# Patient Record
Sex: FEMALE | Race: WHITE | NOT HISPANIC OR LATINO | Employment: UNEMPLOYED | URBAN - METROPOLITAN AREA
[De-identification: names, ages, dates, MRNs, and addresses within clinical notes are randomized per-mention and may not be internally consistent; named-entity substitution may affect disease eponyms.]

---

## 2017-04-18 ENCOUNTER — OFFICE VISIT (OUTPATIENT)
Dept: NEUROLOGY | Facility: CLINIC | Age: 42
End: 2017-04-18

## 2017-04-18 VITALS
BODY MASS INDEX: 24.11 KG/M2 | SYSTOLIC BLOOD PRESSURE: 117 MMHG | DIASTOLIC BLOOD PRESSURE: 84 MMHG | HEART RATE: 115 BPM | WEIGHT: 150 LBS | HEIGHT: 66 IN

## 2017-04-18 DIAGNOSIS — M79.7 FIBROMYALGIA: ICD-10-CM

## 2017-04-18 DIAGNOSIS — R20.2 PARESTHESIA: Primary | ICD-10-CM

## 2017-04-18 DIAGNOSIS — R53.1 WEAKNESS: ICD-10-CM

## 2017-04-18 PROCEDURE — 99214 OFFICE O/P EST MOD 30 MIN: CPT | Performed by: PSYCHIATRY & NEUROLOGY

## 2017-04-18 RX ORDER — GABAPENTIN 300 MG/1
300 CAPSULE ORAL 3 TIMES DAILY
Qty: 90 CAPSULE | Refills: 5 | Status: SHIPPED | OUTPATIENT
Start: 2017-04-18 | End: 2018-01-16

## 2017-04-18 RX ORDER — GABAPENTIN 100 MG/1
100 CAPSULE ORAL 2 TIMES DAILY
COMMUNITY
End: 2017-04-18 | Stop reason: SDUPTHER

## 2017-04-18 NOTE — PROGRESS NOTES
Subjective:     Patient ID: Silvia Tracy is a 42 y.o. female.    History of Present Illness  The following portions of the patient's history were reviewed and updated as appropriate: allergies, current medications, past family history, past medical history, past social history, past surgical history and problem list.  43 y/o WF has been having limb numbness and tingling, was hospitalized for weakness in November, no definite cause found, better with IV steroids. She reports intermittent weakness as well. She has leg aching at night that wakes her up. She has had a rheumatology work up, found to have a positive DWIGHT.  Review of Systems   Constitutional: Positive for fatigue.   Endocrine:        Muscle weakness   Musculoskeletal:        Joint stiffness, limb pain   Neurological: Positive for dizziness and numbness.        Confusion, tingling, limb weakness, difficulty walking    Psychiatric/Behavioral:        Anxiety          Objective:    Neurologic Exam     Mental Status   Oriented to person, place, and time.     Cranial Nerves     CN III, IV, VI   Pupils are equal, round, and reactive to light.  Extraocular motions are normal.     Motor Exam     Strength   Strength 5/5 throughout.       Physical Exam   Constitutional: She is oriented to person, place, and time. She appears well-developed and well-nourished.   HENT:   Head: Normocephalic and atraumatic.   Eyes: EOM are normal. Pupils are equal, round, and reactive to light.   Neck: Normal range of motion. Neck supple. Carotid bruit is not present.   Cardiovascular: Normal rate, regular rhythm, normal heart sounds and intact distal pulses.    Pulmonary/Chest: Effort normal and breath sounds normal.   Abdominal: Soft. Bowel sounds are normal.   Musculoskeletal: Normal range of motion.   Neurological: She is alert and oriented to person, place, and time. She has normal strength and normal reflexes. No cranial nerve deficit or sensory deficit. She displays a  negative Romberg sign. Coordination and gait normal. She displays no Babinski's sign on the right side. She displays no Babinski's sign on the left side.   Skin: Skin is warm.   Psychiatric: She has a normal mood and affect. Her behavior is normal. Thought content normal. Cognition and memory are normal.       Assessment/Plan:     Silvia was seen today for follow-up.    Diagnoses and all orders for this visit:    Paresthesia  -     gabapentin (NEURONTIN) 300 MG capsule; Take 1 capsule by mouth 3 (Three) Times a Day.    Weakness  -     gabapentin (NEURONTIN) 300 MG capsule; Take 1 capsule by mouth 3 (Three) Times a Day.    Fibromyalgia  -     gabapentin (NEURONTIN) 300 MG capsule; Take 1 capsule by mouth 3 (Three) Times a Day.       During this visit the following were done:  Labs Reviewed [x]    Labs Ordered []    Radiology Reports Reviewed [x]    Radiology Ordered []    PCP Records Reviewed []    Referring Provider Records Reviewed []    ER Records Reviewed []    Hospital Records Reviewed [x]    History Obtained From Family []    Radiology Images Reviewed [x]    Other Reviewed []    Records Requested []

## 2017-11-08 DIAGNOSIS — R53.1 WEAKNESS: ICD-10-CM

## 2017-11-08 DIAGNOSIS — R20.2 PARESTHESIA: ICD-10-CM

## 2017-11-08 DIAGNOSIS — M79.7 FIBROMYALGIA: ICD-10-CM

## 2017-11-08 RX ORDER — GABAPENTIN 300 MG/1
CAPSULE ORAL
Qty: 90 CAPSULE | OUTPATIENT
Start: 2017-11-08

## 2017-12-07 ENCOUNTER — OFFICE VISIT (OUTPATIENT)
Dept: NEUROLOGY | Facility: CLINIC | Age: 42
End: 2017-12-07

## 2017-12-07 VITALS
DIASTOLIC BLOOD PRESSURE: 73 MMHG | HEART RATE: 96 BPM | WEIGHT: 160 LBS | HEIGHT: 66 IN | BODY MASS INDEX: 25.71 KG/M2 | OXYGEN SATURATION: 95 % | SYSTOLIC BLOOD PRESSURE: 102 MMHG

## 2017-12-07 DIAGNOSIS — R93.0 ABNORMAL MRI OF HEAD: ICD-10-CM

## 2017-12-07 DIAGNOSIS — R53.82 CHRONIC FATIGUE: ICD-10-CM

## 2017-12-07 DIAGNOSIS — G37.9 DEMYELINATING DISORDER (HCC): Primary | ICD-10-CM

## 2017-12-07 PROCEDURE — 99213 OFFICE O/P EST LOW 20 MIN: CPT | Performed by: PSYCHIATRY & NEUROLOGY

## 2017-12-07 RX ORDER — PREDNISONE 20 MG/1
TABLET ORAL
Qty: 30 TABLET | Refills: 1 | Status: SHIPPED | OUTPATIENT
Start: 2017-12-07 | End: 2018-01-15 | Stop reason: SDUPTHER

## 2017-12-07 NOTE — PROGRESS NOTES
Subjective:     Patient ID: Silvia Tracy is a 42 y.o. female.    CC:   Chief Complaint   Patient presents with   • paresthesia       HPI:   History of Present Illness  The following portions of the patient's history were reviewed and updated as appropriate: allergies, current medications, past family history, past medical history, past social history, past surgical history and problem list.     Patient complains of persistent fatigue, fluctuating numbness and weakness. She has seen rheumatology for positive DWIGHT, told that she does not have lupus. MRI of brain last year did show bilateral frontal gliosis, felt to be atypical for MS, MRIs of spine showed DDD without a high grade stenosis. CSF was negative but MS panel was not done that I could find. She denies a family h/o MS, loss of vision. She does report some heat intolerance that may be new. She feels much better while on steroids.    Past Medical History:   Diagnosis Date   • Acute congestive heart failure    • DWIGHT positive    • Anemia    • Anxiety    • History of Clostridium difficile     stool transplant    • History of transfusion    • Numbness and tingling of left leg        Past Surgical History:   Procedure Laterality Date   •  SECTION     • TEMPOROMANDIBULAR JOINT SURGERY         Social History     Social History   • Marital status:      Spouse name: N/A   • Number of children: N/A   • Years of education: N/A     Occupational History   • Not on file.     Social History Main Topics   • Smoking status: Never Smoker   • Smokeless tobacco: Not on file   • Alcohol use 0.6 oz/week     1 Glasses of wine per week   • Drug use: No   • Sexual activity: Defer     Other Topics Concern   • Not on file     Social History Narrative       Family History   Problem Relation Age of Onset   • Scleroderma Mother    • Coronary artery disease Father    • COPD Father         Review of Systems   Constitutional: Positive for fatigue and unexpected weight  change. Negative for chills and fever.   HENT: Negative for ear pain, hearing loss, nosebleeds, rhinorrhea and sore throat.    Eyes: Negative.  Negative for photophobia, pain, discharge, itching and visual disturbance.   Respiratory: Positive for chest tightness and shortness of breath. Negative for cough and wheezing.    Cardiovascular: Positive for palpitations and leg swelling. Negative for chest pain.   Gastrointestinal: Negative.  Negative for abdominal pain, blood in stool, constipation, diarrhea, nausea and vomiting.   Endocrine: Positive for cold intolerance.   Genitourinary: Negative.  Negative for dysuria, frequency, hematuria and urgency.   Musculoskeletal: Positive for arthralgias, back pain, gait problem, joint swelling, myalgias, neck pain and neck stiffness.   Skin: Negative.  Negative for rash and wound.   Allergic/Immunologic: Negative.  Negative for environmental allergies and food allergies.   Neurological: Positive for dizziness, tremors, speech difficulty, weakness, light-headedness, numbness and headaches. Negative for seizures and syncope.   Hematological: Negative.  Negative for adenopathy. Does not bruise/bleed easily.   Psychiatric/Behavioral: Positive for confusion. Negative for agitation, decreased concentration, hallucinations, sleep disturbance and suicidal ideas. The patient is nervous/anxious.         Objective:    Neurologic Exam     Mental Status   Oriented to person, place, and time.       Physical Exam   Constitutional: She is oriented to person, place, and time. She appears well-developed and well-nourished.   Cardiovascular: Normal rate and regular rhythm.    Pulmonary/Chest: Effort normal.   Neurological: She is alert and oriented to person, place, and time.   DTRs trace increased on the right.   Psychiatric: She has a normal mood and affect. Her behavior is normal. Thought content normal.       Assessment/Plan:       Silvia was seen today for paresthesia.    Diagnoses and  all orders for this visit:    Demyelinating disorder  -     MRI Brain With & Without Contrast  -     FL Lumbar Puncture Diagnosis  -     predniSONE (DELTASONE) 20 MG tablet; 4/dx3d, 3/dx3d, 2/dx3d, 1/x3d    Abnormal MRI of head  -     MRI Brain With & Without Contrast  -     FL Lumbar Puncture Diagnosis  -     predniSONE (DELTASONE) 20 MG tablet; 4/dx3d, 3/dx3d, 2/dx3d, 1/x3d    Chronic fatigue  -     predniSONE (DELTASONE) 20 MG tablet; 4/dx3d, 3/dx3d, 2/dx3d, 1/x3d           Andres Person MD  12/10/2017

## 2017-12-20 ENCOUNTER — HOSPITAL ENCOUNTER (OUTPATIENT)
Dept: GENERAL RADIOLOGY | Facility: HOSPITAL | Age: 42
Discharge: HOME | End: 2017-12-20
Attending: PSYCHIATRY & NEUROLOGY

## 2017-12-20 ENCOUNTER — HOSPITAL ENCOUNTER (OUTPATIENT)
Dept: MRI IMAGING | Facility: HOSPITAL | Age: 42
Discharge: HOME | End: 2017-12-20
Attending: PSYCHIATRY & NEUROLOGY | Admitting: PSYCHIATRY & NEUROLOGY

## 2017-12-20 VITALS
WEIGHT: 160 LBS | RESPIRATION RATE: 18 BRPM | SYSTOLIC BLOOD PRESSURE: 117 MMHG | HEIGHT: 66 IN | BODY MASS INDEX: 25.71 KG/M2 | OXYGEN SATURATION: 97 % | HEART RATE: 116 BPM | DIASTOLIC BLOOD PRESSURE: 80 MMHG | TEMPERATURE: 97.9 F

## 2017-12-20 LAB
APPEARANCE CSF: CLEAR
COLOR CSF: COLORLESS
GLUCOSE CSF-MCNC: 68 MG/DL (ref 40–70)
HOLD SPECIMEN: NORMAL
PROT CSF-MCNC: 18 MG/DL (ref 15–45)
RBC # CSF MANUAL: 1 /MM3 (ref 0–0)
SPECIMEN VOL CSF: 8 ML
TUBE # CSF: 3
WBC # CSF MANUAL: 1 /MM3 (ref 0–5)

## 2017-12-20 PROCEDURE — 82042 OTHER SOURCE ALBUMIN QUAN EA: CPT | Performed by: PSYCHIATRY & NEUROLOGY

## 2017-12-20 PROCEDURE — 82945 GLUCOSE OTHER FLUID: CPT | Performed by: PSYCHIATRY & NEUROLOGY

## 2017-12-20 PROCEDURE — 82040 ASSAY OF SERUM ALBUMIN: CPT | Performed by: PSYCHIATRY & NEUROLOGY

## 2017-12-20 PROCEDURE — 83916 OLIGOCLONAL BANDS: CPT | Performed by: PSYCHIATRY & NEUROLOGY

## 2017-12-20 PROCEDURE — 70553 MRI BRAIN STEM W/O & W/DYE: CPT

## 2017-12-20 PROCEDURE — 84157 ASSAY OF PROTEIN OTHER: CPT | Performed by: PSYCHIATRY & NEUROLOGY

## 2017-12-20 PROCEDURE — A9577 INJ MULTIHANCE: HCPCS | Performed by: PSYCHIATRY & NEUROLOGY

## 2017-12-20 PROCEDURE — 77003 FLUOROGUIDE FOR SPINE INJECT: CPT

## 2017-12-20 PROCEDURE — 83873 ASSAY OF CSF PROTEIN: CPT | Performed by: PSYCHIATRY & NEUROLOGY

## 2017-12-20 PROCEDURE — 0 GADOBENATE DIMEGLUMINE 529 MG/ML SOLUTION: Performed by: PSYCHIATRY & NEUROLOGY

## 2017-12-20 PROCEDURE — 89050 BODY FLUID CELL COUNT: CPT | Performed by: PSYCHIATRY & NEUROLOGY

## 2017-12-20 RX ORDER — LIDOCAINE HYDROCHLORIDE 10 MG/ML
10 INJECTION, SOLUTION INFILTRATION; PERINEURAL ONCE
Status: COMPLETED | OUTPATIENT
Start: 2017-12-20 | End: 2017-12-20

## 2017-12-20 RX ORDER — ALPRAZOLAM 0.5 MG/1
0.5 TABLET ORAL ONCE
Status: COMPLETED | OUTPATIENT
Start: 2017-12-20 | End: 2017-12-20

## 2017-12-20 RX ADMIN — GADOBENATE DIMEGLUMINE 15 ML: 529 INJECTION, SOLUTION INTRAVENOUS at 08:30

## 2017-12-20 RX ADMIN — ALPRAZOLAM 0.5 MG: 0.5 TABLET ORAL at 07:28

## 2017-12-20 RX ADMIN — LIDOCAINE HYDROCHLORIDE 10 ML: 10 INJECTION, SOLUTION INFILTRATION; PERINEURAL at 09:26

## 2017-12-20 NOTE — PLAN OF CARE
Problem: Patient Care Overview (Adult)  Goal: Plan of Care Review  Outcome: Ongoing (interventions implemented as appropriate)   12/20/17 2250   Coping/Psychosocial Response Interventions   Plan Of Care Reviewed With patient;spouse   Patient Care Overview   Progress no change

## 2017-12-20 NOTE — PLAN OF CARE
Problem: Patient Care Overview (Adult)  Goal: Discharge Needs Assessment   12/20/17 0800   Discharge Needs Assessment   Concerns To Be Addressed no discharge needs identified   Readmission Within The Last 30 Days no previous admission in last 30 days   Equipment Needed After Discharge none   Discharge Disposition home or self-care   Current Health   Anticipated Changes Related to Illness none   Self-Care   Equipment Currently Used at Home none   Living Environment   Transportation Available family or friend will provide

## 2017-12-20 NOTE — POST-PROCEDURE NOTE
Radiology Procedure    Pre-procedure: procedure, risks discussed with patient. Patient understood and consented to procedure     Procedure Performed: lumbar puncture     IV Sedation and/or Anesthesia:  No    Complications: none    Preliminary Findings: opening pressure 10cm H2O    Specimen Removed: 8cc clear, colorless CSF    Estimated Blood Loss:  0ml    Post-Procedure Diagnosis: pending    Post-Procedure Plan: encourage fluids, bed rest  X 2 hours    Standard Discharge Instructions Given:yes     SUELLEN Avalos  12/20/17  9:18 AM

## 2017-12-21 ENCOUNTER — TELEPHONE (OUTPATIENT)
Dept: NEUROLOGY | Facility: CLINIC | Age: 42
End: 2017-12-21

## 2017-12-21 ENCOUNTER — TELEPHONE (OUTPATIENT)
Dept: INTERVENTIONAL RADIOLOGY/VASCULAR | Facility: HOSPITAL | Age: 42
End: 2017-12-21

## 2017-12-21 NOTE — TELEPHONE ENCOUNTER
From looking at the MRI, it is essentially unchanged from last year. As for the lumbar puncture, all of her test results are unremarkable so far. However, we are still waiting for a couple of the lab results, which can sometimes take up to a few weeks. Thanks.

## 2017-12-22 LAB
ALB CSF/SERPL: 3 {RATIO} (ref 0–8)
ALBUMIN CSF-MCNC: 12 MG/DL (ref 11–48)
ALBUMIN SERPL-MCNC: 3.7 G/DL (ref 3.5–5.5)
IGG CSF-MCNC: 1.5 MG/DL (ref 0–8.6)
IGG SERPL-MCNC: 759 MG/DL (ref 700–1600)
IGG SYNTH RATE SER+CSF CALC-MRATE: -1 MG/DAY
IGG/ALB CLEAR SER+CSF-RTO: 0.6 (ref 0–0.7)
IGG/ALB CSF: 0.13 {RATIO} (ref 0–0.25)
MBP CSF-MCNC: 1.8 NG/ML (ref 0–1.2)
OLIGOCLONAL BANDS.IT SER+CSF QL: ABNORMAL

## 2017-12-23 ENCOUNTER — APPOINTMENT (OUTPATIENT)
Dept: GENERAL RADIOLOGY | Facility: HOSPITAL | Age: 42
End: 2017-12-23

## 2017-12-23 ENCOUNTER — HOSPITAL ENCOUNTER (EMERGENCY)
Facility: HOSPITAL | Age: 42
Discharge: HOME | End: 2017-12-23
Attending: EMERGENCY MEDICINE | Admitting: EMERGENCY MEDICINE

## 2017-12-23 VITALS
OXYGEN SATURATION: 100 % | BODY MASS INDEX: 25.71 KG/M2 | HEART RATE: 92 BPM | HEIGHT: 66 IN | WEIGHT: 160 LBS | SYSTOLIC BLOOD PRESSURE: 117 MMHG | TEMPERATURE: 98.3 F | RESPIRATION RATE: 16 BRPM | DIASTOLIC BLOOD PRESSURE: 80 MMHG

## 2017-12-23 DIAGNOSIS — G97.1 POST LUMBAR PUNCTURE HEADACHE: ICD-10-CM

## 2017-12-23 DIAGNOSIS — J10.1 INFLUENZA B: Primary | ICD-10-CM

## 2017-12-23 LAB
ALBUMIN SERPL-MCNC: 4.5 G/DL (ref 3.2–4.8)
ALBUMIN/GLOB SERPL: 1.4 G/DL (ref 1.5–2.5)
ALP SERPL-CCNC: 89 U/L (ref 25–100)
ALT SERPL W P-5'-P-CCNC: 20 U/L (ref 7–40)
ANION GAP SERPL CALCULATED.3IONS-SCNC: 9 MMOL/L (ref 3–11)
AST SERPL-CCNC: 19 U/L (ref 0–33)
BASOPHILS # BLD AUTO: 0.01 10*3/MM3 (ref 0–0.2)
BASOPHILS NFR BLD AUTO: 0.2 % (ref 0–1)
BILIRUB SERPL-MCNC: 0.3 MG/DL (ref 0.3–1.2)
BILIRUB UR QL STRIP: NEGATIVE
BUN BLD-MCNC: 12 MG/DL (ref 9–23)
BUN/CREAT SERPL: 17.1 (ref 7–25)
CALCIUM SPEC-SCNC: 9.8 MG/DL (ref 8.7–10.4)
CHLORIDE SERPL-SCNC: 101 MMOL/L (ref 99–109)
CLARITY UR: CLEAR
CO2 SERPL-SCNC: 26 MMOL/L (ref 20–31)
COLOR UR: YELLOW
CREAT BLD-MCNC: 0.7 MG/DL (ref 0.6–1.3)
DEPRECATED RDW RBC AUTO: 52.8 FL (ref 37–54)
EOSINOPHIL # BLD AUTO: 0.18 10*3/MM3 (ref 0–0.3)
EOSINOPHIL NFR BLD AUTO: 2.9 % (ref 0–3)
ERYTHROCYTE [DISTWIDTH] IN BLOOD BY AUTOMATED COUNT: 15.8 % (ref 11.3–14.5)
FLUAV AG NPH QL: NEGATIVE
FLUBV AG NPH QL IA: POSITIVE
GFR SERPL CREATININE-BSD FRML MDRD: 91 ML/MIN/1.73
GLOBULIN UR ELPH-MCNC: 3.2 GM/DL
GLUCOSE BLD-MCNC: 81 MG/DL (ref 70–100)
GLUCOSE UR STRIP-MCNC: NEGATIVE MG/DL
HCT VFR BLD AUTO: 41.8 % (ref 34.5–44)
HGB BLD-MCNC: 14.4 G/DL (ref 11.5–15.5)
HGB UR QL STRIP.AUTO: NEGATIVE
HOLD SPECIMEN: NORMAL
HOLD SPECIMEN: NORMAL
IMM GRANULOCYTES # BLD: 0.04 10*3/MM3 (ref 0–0.03)
IMM GRANULOCYTES NFR BLD: 0.6 % (ref 0–0.6)
KETONES UR QL STRIP: NEGATIVE
LEUKOCYTE ESTERASE UR QL STRIP.AUTO: NEGATIVE
LYMPHOCYTES # BLD AUTO: 1.1 10*3/MM3 (ref 0.6–4.8)
LYMPHOCYTES NFR BLD AUTO: 17.5 % (ref 24–44)
MCH RBC QN AUTO: 31.5 PG (ref 27–31)
MCHC RBC AUTO-ENTMCNC: 34.4 G/DL (ref 32–36)
MCV RBC AUTO: 91.5 FL (ref 80–99)
MONOCYTES # BLD AUTO: 0.82 10*3/MM3 (ref 0–1)
MONOCYTES NFR BLD AUTO: 13.1 % (ref 0–12)
NEUTROPHILS # BLD AUTO: 4.12 10*3/MM3 (ref 1.5–8.3)
NEUTROPHILS NFR BLD AUTO: 65.7 % (ref 41–71)
NITRITE UR QL STRIP: NEGATIVE
PH UR STRIP.AUTO: 7.5 [PH] (ref 5–8)
PLATELET # BLD AUTO: 161 10*3/MM3 (ref 150–450)
PMV BLD AUTO: 10.1 FL (ref 6–12)
POTASSIUM BLD-SCNC: 4.3 MMOL/L (ref 3.5–5.5)
PROT SERPL-MCNC: 7.7 G/DL (ref 5.7–8.2)
PROT UR QL STRIP: NEGATIVE
RBC # BLD AUTO: 4.57 10*6/MM3 (ref 3.89–5.14)
SODIUM BLD-SCNC: 136 MMOL/L (ref 132–146)
SP GR UR STRIP: 1.01 (ref 1–1.03)
UROBILINOGEN UR QL STRIP: NORMAL
WBC NRBC COR # BLD: 6.27 10*3/MM3 (ref 3.5–10.8)
WHOLE BLOOD HOLD SPECIMEN: NORMAL
WHOLE BLOOD HOLD SPECIMEN: NORMAL

## 2017-12-23 PROCEDURE — 99284 EMERGENCY DEPT VISIT MOD MDM: CPT

## 2017-12-23 PROCEDURE — 71020 HC CHEST PA AND LATERAL: CPT

## 2017-12-23 PROCEDURE — 96375 TX/PRO/DX INJ NEW DRUG ADDON: CPT

## 2017-12-23 PROCEDURE — 96361 HYDRATE IV INFUSION ADD-ON: CPT

## 2017-12-23 PROCEDURE — 96374 THER/PROPH/DIAG INJ IV PUSH: CPT

## 2017-12-23 PROCEDURE — 25010000002 METOCLOPRAMIDE PER 10 MG: Performed by: NURSE PRACTITIONER

## 2017-12-23 PROCEDURE — 87804 INFLUENZA ASSAY W/OPTIC: CPT | Performed by: EMERGENCY MEDICINE

## 2017-12-23 PROCEDURE — 25010000002 KETOROLAC TROMETHAMINE PER 15 MG: Performed by: NURSE PRACTITIONER

## 2017-12-23 PROCEDURE — 25010000002 DIPHENHYDRAMINE PER 50 MG: Performed by: NURSE PRACTITIONER

## 2017-12-23 PROCEDURE — 87040 BLOOD CULTURE FOR BACTERIA: CPT | Performed by: NURSE PRACTITIONER

## 2017-12-23 PROCEDURE — 81003 URINALYSIS AUTO W/O SCOPE: CPT | Performed by: NURSE PRACTITIONER

## 2017-12-23 PROCEDURE — 80053 COMPREHEN METABOLIC PANEL: CPT | Performed by: EMERGENCY MEDICINE

## 2017-12-23 PROCEDURE — 85025 COMPLETE CBC W/AUTO DIFF WBC: CPT | Performed by: NURSE PRACTITIONER

## 2017-12-23 RX ORDER — METOCLOPRAMIDE HYDROCHLORIDE 5 MG/ML
10 INJECTION INTRAMUSCULAR; INTRAVENOUS ONCE
Status: COMPLETED | OUTPATIENT
Start: 2017-12-23 | End: 2017-12-23

## 2017-12-23 RX ORDER — SODIUM CHLORIDE 0.9 % (FLUSH) 0.9 %
10 SYRINGE (ML) INJECTION AS NEEDED
Status: DISCONTINUED | OUTPATIENT
Start: 2017-12-23 | End: 2017-12-23 | Stop reason: HOSPADM

## 2017-12-23 RX ORDER — KETOROLAC TROMETHAMINE 30 MG/ML
30 INJECTION, SOLUTION INTRAMUSCULAR; INTRAVENOUS ONCE
Status: COMPLETED | OUTPATIENT
Start: 2017-12-23 | End: 2017-12-23

## 2017-12-23 RX ORDER — DIPHENHYDRAMINE HYDROCHLORIDE 50 MG/ML
25 INJECTION INTRAMUSCULAR; INTRAVENOUS ONCE
Status: COMPLETED | OUTPATIENT
Start: 2017-12-23 | End: 2017-12-23

## 2017-12-23 RX ADMIN — HYDROCODONE POLISTIREX AND CHLORPHENIRAMINE POLISTIREX 5 ML: 10; 8 SUSPENSION, EXTENDED RELEASE ORAL at 14:00

## 2017-12-23 RX ADMIN — DIPHENHYDRAMINE HYDROCHLORIDE 25 MG: 50 INJECTION INTRAMUSCULAR; INTRAVENOUS at 12:26

## 2017-12-23 RX ADMIN — KETOROLAC TROMETHAMINE 30 MG: 30 INJECTION, SOLUTION INTRAMUSCULAR at 12:23

## 2017-12-23 RX ADMIN — METOCLOPRAMIDE 10 MG: 5 INJECTION, SOLUTION INTRAMUSCULAR; INTRAVENOUS at 12:26

## 2017-12-23 RX ADMIN — SODIUM CHLORIDE 1000 ML: 9 INJECTION, SOLUTION INTRAVENOUS at 12:23

## 2017-12-26 ENCOUNTER — TELEPHONE (OUTPATIENT)
Dept: NEUROLOGY | Facility: CLINIC | Age: 42
End: 2017-12-26

## 2017-12-26 DIAGNOSIS — G97.1 POST LUMBAR PUNCTURE HEADACHE: Primary | ICD-10-CM

## 2017-12-26 NOTE — TELEPHONE ENCOUNTER
----- Message from Andres Person MD sent at 12/22/2017 11:25 AM EST -----  Regarding: MRI, lumbar puncture  MRI brain is stable/unchanged from last year. Preliminary spinal fluid results normal, MS panel not back, may not be back until after Jan. 1. Thanks  ----- Message -----     From: Interface, Rad Results [place]     Sent: 12/20/2017  11:27 AM       To: Andres Person MD

## 2017-12-26 NOTE — TELEPHONE ENCOUNTER
Pt has a lumbar puncture on Wednesday and has had a headache since Thursday. She went to the ER Saturday but no one was available to give her a blood patch. She was wondering if you would order this for her today. She is still experiencing the headache. Please advise.

## 2017-12-27 ENCOUNTER — TELEPHONE (OUTPATIENT)
Dept: NEUROLOGY | Facility: CLINIC | Age: 42
End: 2017-12-27

## 2017-12-28 LAB
BACTERIA SPEC AEROBE CULT: NORMAL
BACTERIA SPEC AEROBE CULT: NORMAL

## 2018-01-02 ENCOUNTER — TELEPHONE (OUTPATIENT)
Dept: NEUROLOGY | Facility: CLINIC | Age: 43
End: 2018-01-02

## 2018-01-02 NOTE — TELEPHONE ENCOUNTER
Pt is requesting her results of her MS profile. Please advise. She does not want to wait until Dr. Person gets back.

## 2018-01-15 ENCOUNTER — OFFICE VISIT (OUTPATIENT)
Dept: NEUROLOGY | Facility: CLINIC | Age: 43
End: 2018-01-15

## 2018-01-15 VITALS
HEART RATE: 109 BPM | WEIGHT: 156 LBS | HEIGHT: 66 IN | SYSTOLIC BLOOD PRESSURE: 110 MMHG | DIASTOLIC BLOOD PRESSURE: 75 MMHG | BODY MASS INDEX: 25.07 KG/M2

## 2018-01-15 DIAGNOSIS — R93.0 ABNORMAL MRI OF HEAD: ICD-10-CM

## 2018-01-15 DIAGNOSIS — R53.82 CHRONIC FATIGUE: ICD-10-CM

## 2018-01-15 DIAGNOSIS — R53.1 LEFT-SIDED WEAKNESS: ICD-10-CM

## 2018-01-15 DIAGNOSIS — G37.9 DEMYELINATING DISEASE (HCC): Primary | ICD-10-CM

## 2018-01-15 DIAGNOSIS — G37.9 DEMYELINATING DISORDER (HCC): ICD-10-CM

## 2018-01-15 PROCEDURE — 99214 OFFICE O/P EST MOD 30 MIN: CPT | Performed by: PSYCHIATRY & NEUROLOGY

## 2018-01-15 RX ORDER — PREDNISONE 20 MG/1
TABLET ORAL
Qty: 30 TABLET | Refills: 1 | Status: SHIPPED | OUTPATIENT
Start: 2018-01-15 | End: 2018-06-19

## 2018-01-15 RX ORDER — VENLAFAXINE HYDROCHLORIDE 75 MG/1
150 CAPSULE, EXTENDED RELEASE ORAL DAILY
Qty: 30 CAPSULE | Refills: 2 | Status: SHIPPED | OUTPATIENT
Start: 2018-01-15 | End: 2018-06-19

## 2018-01-15 NOTE — PROGRESS NOTES
Subjective:     Patient ID: Silvia Tracy is a 42 y.o. female.    CC:   Chief Complaint   Patient presents with   • Dizziness   • Gait Problem       HPI:   History of Present Illness  The following portions of the patient's history were reviewed and updated as appropriate: allergies, current medications, past family history, past medical history, past social history, past surgical history and problem list.     Patient her with , compains of increasing fatigue, left sided pain and weakness. MRI brain w/wo was stable form a year ago, MS panel positive for mild elevation of MBP. Her symptoms improve remarkably with prednisone.    Past Medical History:   Diagnosis Date   • Acute congestive heart failure    • DWIGHT positive    • Anemia    • Anxiety    • History of Clostridium difficile     stool transplant    • History of transfusion    • Numbness and tingling of left leg        Past Surgical History:   Procedure Laterality Date   •  SECTION     • TEMPOROMANDIBULAR JOINT SURGERY     • TUBAL ABDOMINAL LIGATION         Social History     Social History   • Marital status:      Spouse name: N/A   • Number of children: N/A   • Years of education: N/A     Occupational History   • Not on file.     Social History Main Topics   • Smoking status: Never Smoker   • Smokeless tobacco: Not on file   • Alcohol use 0.6 oz/week     1 Glasses of wine per week   • Drug use: No   • Sexual activity: Defer     Other Topics Concern   • Not on file     Social History Narrative       Family History   Problem Relation Age of Onset   • Scleroderma Mother    • Coronary artery disease Father    • COPD Father         Review of Systems   Constitutional: Positive for fatigue. Negative for chills, diaphoresis, fever and unexpected weight change.   HENT: Negative for ear pain, hearing loss, nosebleeds, rhinorrhea and sore throat.    Eyes: Positive for pain. Negative for photophobia, discharge, itching and visual disturbance.    Respiratory: Negative for cough, chest tightness, shortness of breath and wheezing.    Cardiovascular: Negative for chest pain, palpitations and leg swelling.   Gastrointestinal: Negative for abdominal pain, blood in stool, constipation, diarrhea, nausea and vomiting.   Genitourinary: Negative for dysuria, frequency, hematuria and urgency.   Musculoskeletal: Positive for arthralgias, back pain, gait problem, joint swelling, myalgias, neck pain and neck stiffness.   Skin: Negative for rash and wound.   Allergic/Immunologic: Negative for environmental allergies and food allergies.   Neurological: Positive for dizziness, speech difficulty, weakness and numbness. Negative for tremors, seizures, syncope, light-headedness and headaches.   Hematological: Negative for adenopathy. Does not bruise/bleed easily.   Psychiatric/Behavioral: Positive for confusion. Negative for agitation, decreased concentration, hallucinations, sleep disturbance and suicidal ideas. The patient is nervous/anxious.         Objective:    Neurologic Exam     Mental Status   Oriented to person, place, and time.       Physical Exam   Constitutional: She is oriented to person, place, and time. She appears well-developed and well-nourished.   Cardiovascular: Normal rate and regular rhythm.    Pulmonary/Chest: Effort normal.   Neurological: She is alert and oriented to person, place, and time. She has normal reflexes.   Gait slightly spastic, left Mcdonnell's   Psychiatric: She has a normal mood and affect. Her behavior is normal. Thought content normal.       Assessment/Plan:       Silvia was seen today for dizziness and gait problem.    Diagnoses and all orders for this visit:    Demyelinating disease  -     Ambulatory Referral to Neurology    Left-sided weakness  -     Ambulatory Referral to Neurology    Chronic fatigue  -     predniSONE (DELTASONE) 20 MG tablet; 4/dx3d, 3/dx3d, 2/dx3d, 1/x3d  -     venlafaxine XR (EFFEXOR XR) 75 MG 24 hr capsule;  Take 2 capsules by mouth Daily.    Demyelinating disorder  -     predniSONE (DELTASONE) 20 MG tablet; 4/dx3d, 3/dx3d, 2/dx3d, 1/x3d  -     Ambulatory Referral to Neurology    Abnormal MRI of head  -     predniSONE (DELTASONE) 20 MG tablet; 4/dx3d, 3/dx3d, 2/dx3d, 1/x3d  -     Ambulatory Referral to Neurology         We discussed uncertainty of diagnosis, possibility of MS. Recommend second opinion with MS clinic, call for problems.    Andres Person MD  1/15/2018

## 2018-01-16 ENCOUNTER — OFFICE VISIT (OUTPATIENT)
Dept: NEUROLOGY | Facility: CLINIC | Age: 43
End: 2018-01-16

## 2018-01-16 VITALS
BODY MASS INDEX: 23.46 KG/M2 | WEIGHT: 146 LBS | SYSTOLIC BLOOD PRESSURE: 108 MMHG | OXYGEN SATURATION: 99 % | RESPIRATION RATE: 18 BRPM | DIASTOLIC BLOOD PRESSURE: 72 MMHG | HEART RATE: 103 BPM | HEIGHT: 66 IN

## 2018-01-16 DIAGNOSIS — G43.111 INTRACTABLE MIGRAINE WITH AURA WITH STATUS MIGRAINOSUS: Primary | ICD-10-CM

## 2018-01-16 PROBLEM — G37.9 DEMYELINATING DISEASE: Status: RESOLVED | Noted: 2018-01-15 | Resolved: 2018-01-16

## 2018-01-16 PROCEDURE — 99214 OFFICE O/P EST MOD 30 MIN: CPT | Performed by: PSYCHIATRY & NEUROLOGY

## 2018-01-16 RX ORDER — TOPIRAMATE 25 MG/1
TABLET ORAL
Qty: 120 TABLET | Refills: 3 | Status: SHIPPED | OUTPATIENT
Start: 2018-01-16 | End: 2018-02-19 | Stop reason: SDUPTHER

## 2018-01-16 NOTE — PROGRESS NOTES
Subjective   Patient ID: Silvia Tracy is a 42 y.o. female     Chief Complaint   Patient presents with   • Demyelinating Disease      Left sided weakness      History of Present Illness    42 y.o. woman presents as a new patient for second opinion of left sided weakness from Dr Person.  Received flu shot in 10/2016 developed pain in left arm and then left leg had dull ache and weakness.  Treated with steroids and sx improved for a few weeks.      Develops ache behind eye, left arm and leg have dull ache and weakness.  Sx last until she gets steroids.  Sx get worse with being up and fatigue.  Rheumatology did not find a diagnosis. GBP of no benefit.      Dr Person prescribed prednisone.      Reviewed [facility] and Dr Person records:  PMH of anemia, CHF, recurrent C diff colitis s/p fecal transplant.     Admitted 10/27/16 - 10/29/16 for left sided weakness after a flu shot. Dx with complex migraines    MRI Brain/Cervical/Thoracic, my review of films, few scattered tiny T2 white hyper intensities  EMG/NCS - mild sensory neuropathy    Readmitted 11/15/17 - 11/19/17 for persistent left sided weakness.  + DWIGHT.  Treated with steroids and has had improved sx.   LP - 11/11/16 WBC 4, RBC 0, Gluc 52, prot 20.  Cx neg    LP 12/20/17 - prot 18, gluc 68, WBC 1, RBC 1, 0 OCB    RNP ab neg,   Past Medical History:   Diagnosis Date   • Acute congestive heart failure    • DWIGHT positive    • Anemia    • Anxiety    • History of Clostridium difficile     stool transplant 08/16   • History of transfusion    • Numbness and tingling of left leg      Family History   Problem Relation Age of Onset   • Scleroderma Mother    • Coronary artery disease Father    • COPD Father      Social History     Social History   • Marital status:      Spouse name: N/A   • Number of children: N/A   • Years of education: N/A     Social History Main Topics   • Smoking status: Never Smoker   • Smokeless tobacco: None   • Alcohol use 0.6 oz/week     1  "Glasses of wine per week   • Drug use: No   • Sexual activity: Defer     Other Topics Concern   • None     Social History Narrative       Review of Systems   Constitutional: Positive for fatigue. Negative for activity change and unexpected weight change.   HENT: Negative for tinnitus and trouble swallowing.    Eyes: Negative for photophobia and visual disturbance.   Respiratory: Negative for apnea, cough and choking.    Cardiovascular: Negative for leg swelling.   Gastrointestinal: Negative for nausea and vomiting.   Endocrine: Negative for cold intolerance and heat intolerance.   Genitourinary: Negative for difficulty urinating, frequency, menstrual problem and urgency.   Musculoskeletal: Negative for back pain, gait problem, myalgias and neck pain.   Skin: Negative for color change and rash.   Allergic/Immunologic: Negative for immunocompromised state.   Neurological: Positive for weakness and headaches. Negative for dizziness, tremors, seizures, syncope, facial asymmetry, speech difficulty, light-headedness and numbness.   Hematological: Negative for adenopathy. Does not bruise/bleed easily.   Psychiatric/Behavioral: Negative for behavioral problems, confusion, decreased concentration, hallucinations and sleep disturbance.       Objective     Vitals:    01/16/18 1038   BP: 108/72   BP Location: Left arm   Patient Position: Sitting   Cuff Size: Adult   Pulse: 103   Resp: 18   SpO2: 99%   Weight: 66.2 kg (146 lb)   Height: 167.6 cm (66\")       Neurologic Exam     Mental Status   Oriented to person, place, and time.   Registration: recalls 3 of 3 objects. Recall at 5 minutes: recalls 3 of 3 objects. Follows 3 step commands.   Attention: normal. Concentration: normal.   Speech: speech is normal   Level of consciousness: alert  Knowledge: good and consistent with education.   Able to name object. Able to read. Able to repeat. Able to write. Normal comprehension.     Cranial Nerves     CN II   Visual fields full to " confrontation.   Visual acuity: normal  Right visual field deficit: none  Left visual field deficit: none     CN III, IV, VI   Pupils are equal, round, and reactive to light.  Extraocular motions are normal.   Right pupil: Shape: regular. Reactivity: brisk. Consensual response: intact.   Left pupil: Shape: regular. Reactivity: brisk. Consensual response: intact.   Nystagmus: none   Diplopia: none  Ophthalmoparesis: none  Upgaze: normal  Downgaze: normal  Conjugate gaze: present  Vestibulo-ocular reflex: present    CN V   Facial sensation intact.   Right corneal reflex: normal  Left corneal reflex: normal    CN VII   Right facial weakness: none  Left facial weakness: none    CN VIII   Hearing: intact    CN IX, X   Palate: symmetric  Right gag reflex: normal  Left gag reflex: normal    CN XI   Right sternocleidomastoid strength: normal  Left sternocleidomastoid strength: normal    CN XII   Tongue: not atrophic  Fasciculations: absent  Tongue deviation: none    Motor Exam   Muscle bulk: normal  Overall muscle tone: normal  Right arm tone: normal  Left arm tone: normal  Right leg tone: normal  Left leg tone: normal    Strength   Strength 5/5 throughout.     Sensory Exam   Light touch normal.   Vibration normal.   Proprioception normal.   Pinprick normal.     Gait, Coordination, and Reflexes     Gait  Gait: normal    Coordination   Romberg: negative  Finger to nose coordination: normal  Heel to shin coordination: normal  Tandem walking coordination: normal    Tremor   Resting tremor: absent  Intention tremor: absent  Action tremor: absent    Reflexes   Reflexes 2+ except as noted.       Physical Exam   Constitutional: She is oriented to person, place, and time. Vital signs are normal. She appears well-developed and well-nourished.   HENT:   Head: Normocephalic and atraumatic.   Eyes: EOM and lids are normal. Pupils are equal, round, and reactive to light.   Fundoscopic exam:       The right eye shows no exudate, no  hemorrhage and no papilledema. The right eye shows venous pulsations.        The left eye shows no exudate, no hemorrhage and no papilledema. The left eye shows venous pulsations.   Neck: Normal range of motion and phonation normal. Normal carotid pulses present. Carotid bruit is not present. No thyroid mass and no thyromegaly present.   Cardiovascular: Normal rate, regular rhythm and normal heart sounds.    Pulmonary/Chest: Effort normal.   Neurological: She is oriented to person, place, and time. She has normal strength. She has a normal Finger-Nose-Finger Test, a normal Heel to Shin Test, a normal Romberg Test and a normal Tandem Gait Test. Gait normal.   Skin: Skin is warm and dry.   Psychiatric: She has a normal mood and affect. Her speech is normal.   Nursing note and vitals reviewed.      Admission on 12/23/2017, Discharged on 12/23/2017   Component Date Value Ref Range Status   • Influenza A Ag, EIA 12/23/2017 Negative  Negative Final   • Influenza B Ag, EIA 12/23/2017 Positive* Negative Final   • Extra Tube 12/23/2017 hold for add-on   Final    Auto resulted   • Extra Tube 12/23/2017 Hold for add-ons.   Final    Auto resulted.   • Extra Tube 12/23/2017 hold for add-on   Final    Auto resulted   • Extra Tube 12/23/2017 Hold for add-ons.   Final    Auto resulted.   • Color, UA 12/23/2017 Yellow  Yellow, Straw Final   • Appearance, UA 12/23/2017 Clear  Clear Final   • pH, UA 12/23/2017 7.5  5.0 - 8.0 Final   • Specific Gravity, UA 12/23/2017 1.006  1.001 - 1.030 Final   • Glucose, UA 12/23/2017 Negative  Negative Final   • Ketones, UA 12/23/2017 Negative  Negative Final   • Bilirubin, UA 12/23/2017 Negative  Negative Final   • Blood, UA 12/23/2017 Negative  Negative Final   • Protein, UA 12/23/2017 Negative  Negative Final   • Leuk Esterase, UA 12/23/2017 Negative  Negative Final   • Nitrite, UA 12/23/2017 Negative  Negative Final   • Urobilinogen, UA 12/23/2017 0.2 E.U./dL  0.2 - 1.0 E.U./dL Final   • Blood  Culture 12/23/2017 No growth at 5 days   Final   • Blood Culture 12/23/2017 No growth at 5 days   Final   • WBC 12/23/2017 6.27  3.50 - 10.80 10*3/mm3 Final   • RBC 12/23/2017 4.57  3.89 - 5.14 10*6/mm3 Final   • Hemoglobin 12/23/2017 14.4  11.5 - 15.5 g/dL Final   • Hematocrit 12/23/2017 41.8  34.5 - 44.0 % Final   • MCV 12/23/2017 91.5  80.0 - 99.0 fL Final   • MCH 12/23/2017 31.5* 27.0 - 31.0 pg Final   • MCHC 12/23/2017 34.4  32.0 - 36.0 g/dL Final   • RDW 12/23/2017 15.8* 11.3 - 14.5 % Final   • RDW-SD 12/23/2017 52.8  37.0 - 54.0 fl Final   • MPV 12/23/2017 10.1  6.0 - 12.0 fL Final   • Platelets 12/23/2017 161  150 - 450 10*3/mm3 Final   • Neutrophil % 12/23/2017 65.7  41.0 - 71.0 % Final   • Lymphocyte % 12/23/2017 17.5* 24.0 - 44.0 % Final   • Monocyte % 12/23/2017 13.1* 0.0 - 12.0 % Final   • Eosinophil % 12/23/2017 2.9  0.0 - 3.0 % Final   • Basophil % 12/23/2017 0.2  0.0 - 1.0 % Final   • Immature Grans % 12/23/2017 0.6  0.0 - 0.6 % Final   • Neutrophils, Absolute 12/23/2017 4.12  1.50 - 8.30 10*3/mm3 Final   • Lymphocytes, Absolute 12/23/2017 1.10  0.60 - 4.80 10*3/mm3 Final   • Monocytes, Absolute 12/23/2017 0.82  0.00 - 1.00 10*3/mm3 Final   • Eosinophils, Absolute 12/23/2017 0.18  0.00 - 0.30 10*3/mm3 Final   • Basophils, Absolute 12/23/2017 0.01  0.00 - 0.20 10*3/mm3 Final   • Immature Grans, Absolute 12/23/2017 0.04* 0.00 - 0.03 10*3/mm3 Final   • Glucose 12/23/2017 81  70 - 100 mg/dL Final   • BUN 12/23/2017 12  9 - 23 mg/dL Final   • Creatinine 12/23/2017 0.70  0.60 - 1.30 mg/dL Final   • Sodium 12/23/2017 136  132 - 146 mmol/L Final   • Potassium 12/23/2017 4.3  3.5 - 5.5 mmol/L Final   • Chloride 12/23/2017 101  99 - 109 mmol/L Final   • CO2 12/23/2017 26.0  20.0 - 31.0 mmol/L Final   • Calcium 12/23/2017 9.8  8.7 - 10.4 mg/dL Final   • Total Protein 12/23/2017 7.7  5.7 - 8.2 g/dL Final   • Albumin 12/23/2017 4.50  3.20 - 4.80 g/dL Final   • ALT (SGPT) 12/23/2017 20  7 - 40 U/L Final   • AST  (SGOT) 12/23/2017 19  0 - 33 U/L Final   • Alkaline Phosphatase 12/23/2017 89  25 - 100 U/L Final   • Total Bilirubin 12/23/2017 0.3  0.3 - 1.2 mg/dL Final   • eGFR Non  Amer 12/23/2017 91  >60 mL/min/1.73 Final   • Globulin 12/23/2017 3.2  gm/dL Final   • A/G Ratio 12/23/2017 1.4* 1.5 - 2.5 g/dL Final   • BUN/Creatinine Ratio 12/23/2017 17.1  7.0 - 25.0 Final   • Anion Gap 12/23/2017 9.0  3.0 - 11.0 mmol/L Final         Assessment/Plan     Problem List Items Addressed This Visit        Cardiovascular and Mediastinum    Migraine with aura - Primary    Current Assessment & Plan     Headaches are worsening.  Medication changes per orders.     Start TPM              Relevant Medications    traZODone (DESYREL) 50 MG tablet    amitriptyline (ELAVIL) 10 MG tablet    acetaminophen (TYLENOL) 500 MG tablet    venlafaxine XR (EFFEXOR XR) 75 MG 24 hr capsule    topiramate (TOPAMAX) 25 MG tablet             No Follow-up on file.

## 2018-02-19 ENCOUNTER — OFFICE VISIT (OUTPATIENT)
Dept: NEUROLOGY | Facility: CLINIC | Age: 43
End: 2018-02-19

## 2018-02-19 VITALS
HEART RATE: 112 BPM | HEIGHT: 66 IN | WEIGHT: 153 LBS | BODY MASS INDEX: 24.59 KG/M2 | OXYGEN SATURATION: 98 % | RESPIRATION RATE: 16 BRPM | SYSTOLIC BLOOD PRESSURE: 108 MMHG | DIASTOLIC BLOOD PRESSURE: 72 MMHG

## 2018-02-19 DIAGNOSIS — G43.111 INTRACTABLE MIGRAINE WITH AURA WITH STATUS MIGRAINOSUS: Primary | ICD-10-CM

## 2018-02-19 PROCEDURE — 99213 OFFICE O/P EST LOW 20 MIN: CPT | Performed by: PSYCHIATRY & NEUROLOGY

## 2018-02-19 RX ORDER — TOPIRAMATE 100 MG/1
100 TABLET, FILM COATED ORAL 2 TIMES DAILY
Qty: 60 TABLET | Refills: 11 | Status: SHIPPED | OUTPATIENT
Start: 2018-02-19 | End: 2018-04-24 | Stop reason: SDUPTHER

## 2018-02-19 NOTE — PROGRESS NOTES
Subjective   Patient ID: Silvia Tracy is a 43 y.o. female     Chief Complaint   Patient presents with   • Migraine      Left sided weakness      History of Present Illness    43 y.o. woman returns in follow up for complex migraines.  Last visit on 1/16/18 started TPM.      After increasing TPM 50 mg BID, ache behind left eye resolved and left sided weakness improved.  Last week left eye started aching and left side hurt.    Intensity markedly decreased.      Problem history:    Received flu shot in 10/2016 developed pain in left arm and then left leg had dull ache and weakness.  Treated with steroids and sx improved for a few weeks.      Develops ache behind eye, left arm and leg have dull ache and weakness.  Sx last until she gets steroids.  Sx get worse with being up and fatigue.  Rheumatology did not find a diagnosis. GBP of no benefit.      Dr Person prescribed prednisone.      Reviewed [facility] and Dr Person records:  PMH of anemia, CHF, recurrent C diff colitis s/p fecal transplant.     Admitted 10/27/16 - 10/29/16 for left sided weakness after a flu shot. Dx with complex migraines    MRI Brain/Cervical/Thoracic, my review of films, few scattered tiny T2 white hyper intensities  EMG/NCS - mild sensory neuropathy    Readmitted 11/15/17 - 11/19/17 for persistent left sided weakness.  + DWIGHT.  Treated with steroids and has had improved sx.   LP - 11/11/16 WBC 4, RBC 0, Gluc 52, prot 20.  Cx neg    LP 12/20/17 - prot 18, gluc 68, WBC 1, RBC 1, 0 OCB    RNP ab neg,   Past Medical History:   Diagnosis Date   • Acute congestive heart failure    • DWIGHT positive    • Anemia    • Anxiety    • History of Clostridium difficile     stool transplant 08/16   • History of transfusion    • Numbness and tingling of left leg      Family History   Problem Relation Age of Onset   • Scleroderma Mother    • Coronary artery disease Father    • COPD Father      Social History     Social History   • Marital status:      Spouse  "name: N/A   • Number of children: N/A   • Years of education: N/A     Social History Main Topics   • Smoking status: Never Smoker   • Smokeless tobacco: None   • Alcohol use 0.6 oz/week     1 Glasses of wine per week   • Drug use: No   • Sexual activity: Defer     Other Topics Concern   • None     Social History Narrative       Review of Systems   Constitutional: Positive for fatigue. Negative for activity change and unexpected weight change.   HENT: Negative for tinnitus and trouble swallowing.    Eyes: Negative for photophobia and visual disturbance.   Respiratory: Negative for apnea, cough and choking.    Cardiovascular: Negative for leg swelling.   Gastrointestinal: Negative for nausea and vomiting.   Endocrine: Negative for cold intolerance and heat intolerance.   Genitourinary: Negative for difficulty urinating, frequency, menstrual problem and urgency.   Musculoskeletal: Negative for back pain, gait problem, myalgias and neck pain.   Skin: Negative for color change and rash.   Allergic/Immunologic: Negative for immunocompromised state.   Neurological: Positive for weakness and headaches. Negative for dizziness, tremors, seizures, syncope, facial asymmetry, speech difficulty, light-headedness and numbness.   Hematological: Negative for adenopathy. Does not bruise/bleed easily.   Psychiatric/Behavioral: Negative for behavioral problems, confusion, decreased concentration, hallucinations and sleep disturbance.       Objective     Vitals:    02/19/18 1104   BP: 108/72   BP Location: Left arm   Patient Position: Sitting   Cuff Size: Adult   Pulse: 112   Resp: 16   SpO2: 98%   Weight: 69.4 kg (153 lb)   Height: 167.6 cm (66\")       Neurologic Exam     Mental Status   Oriented to person, place, and time.   Attention: normal. Concentration: normal.   Speech: speech is normal   Level of consciousness: alert  Knowledge: good and consistent with education.   Normal comprehension.     Cranial Nerves     CN II   Visual " fields full to confrontation.   Visual acuity: normal  Right visual field deficit: none  Left visual field deficit: none     CN III, IV, VI   Pupils are equal, round, and reactive to light.  Extraocular motions are normal.   Nystagmus: none   Diplopia: none  Ophthalmoparesis: none  Upgaze: normal  Downgaze: normal  Conjugate gaze: present    CN V   Facial sensation intact.   Right corneal reflex: normal  Left corneal reflex: normal    CN VII   Right facial weakness: none  Left facial weakness: none    CN VIII   Hearing: intact    CN IX, X   Palate: symmetric  Right gag reflex: normal  Left gag reflex: normal    CN XI   Right sternocleidomastoid strength: normal  Left sternocleidomastoid strength: normal    CN XII   Tongue: not atrophic  Fasciculations: absent  Tongue deviation: none    Motor Exam   Muscle bulk: normal  Overall muscle tone: normal  Right arm tone: normal  Left arm tone: normal  Right leg tone: normal  Left leg tone: normal    Strength   Strength 5/5 throughout.     Sensory Exam   Light touch normal.     Gait, Coordination, and Reflexes     Gait  Gait: normal    Coordination   Finger to nose coordination: normal    Tremor   Resting tremor: absent  Intention tremor: absent  Action tremor: absent    Reflexes   Reflexes 2+ except as noted.       Physical Exam   Constitutional: She is oriented to person, place, and time. She appears well-developed and well-nourished.   Eyes: EOM are normal. Pupils are equal, round, and reactive to light.   Neurological: She is oriented to person, place, and time. She has normal strength. She has a normal Finger-Nose-Finger Test. Gait normal.   Psychiatric: Her speech is normal.   Nursing note and vitals reviewed.      Admission on 12/23/2017, Discharged on 12/23/2017   Component Date Value Ref Range Status   • Influenza A Ag, EIA 12/23/2017 Negative  Negative Final   • Influenza B Ag, EIA 12/23/2017 Positive* Negative Final   • Extra Tube 12/23/2017 hold for add-on    Final    Auto resulted   • Extra Tube 12/23/2017 Hold for add-ons.   Final    Auto resulted.   • Extra Tube 12/23/2017 hold for add-on   Final    Auto resulted   • Extra Tube 12/23/2017 Hold for add-ons.   Final    Auto resulted.   • Color, UA 12/23/2017 Yellow  Yellow, Straw Final   • Appearance, UA 12/23/2017 Clear  Clear Final   • pH, UA 12/23/2017 7.5  5.0 - 8.0 Final   • Specific Gravity, UA 12/23/2017 1.006  1.001 - 1.030 Final   • Glucose, UA 12/23/2017 Negative  Negative Final   • Ketones, UA 12/23/2017 Negative  Negative Final   • Bilirubin, UA 12/23/2017 Negative  Negative Final   • Blood, UA 12/23/2017 Negative  Negative Final   • Protein, UA 12/23/2017 Negative  Negative Final   • Leuk Esterase, UA 12/23/2017 Negative  Negative Final   • Nitrite, UA 12/23/2017 Negative  Negative Final   • Urobilinogen, UA 12/23/2017 0.2 E.U./dL  0.2 - 1.0 E.U./dL Final   • Blood Culture 12/23/2017 No growth at 5 days   Final   • Blood Culture 12/23/2017 No growth at 5 days   Final   • WBC 12/23/2017 6.27  3.50 - 10.80 10*3/mm3 Final   • RBC 12/23/2017 4.57  3.89 - 5.14 10*6/mm3 Final   • Hemoglobin 12/23/2017 14.4  11.5 - 15.5 g/dL Final   • Hematocrit 12/23/2017 41.8  34.5 - 44.0 % Final   • MCV 12/23/2017 91.5  80.0 - 99.0 fL Final   • MCH 12/23/2017 31.5* 27.0 - 31.0 pg Final   • MCHC 12/23/2017 34.4  32.0 - 36.0 g/dL Final   • RDW 12/23/2017 15.8* 11.3 - 14.5 % Final   • RDW-SD 12/23/2017 52.8  37.0 - 54.0 fl Final   • MPV 12/23/2017 10.1  6.0 - 12.0 fL Final   • Platelets 12/23/2017 161  150 - 450 10*3/mm3 Final   • Neutrophil % 12/23/2017 65.7  41.0 - 71.0 % Final   • Lymphocyte % 12/23/2017 17.5* 24.0 - 44.0 % Final   • Monocyte % 12/23/2017 13.1* 0.0 - 12.0 % Final   • Eosinophil % 12/23/2017 2.9  0.0 - 3.0 % Final   • Basophil % 12/23/2017 0.2  0.0 - 1.0 % Final   • Immature Grans % 12/23/2017 0.6  0.0 - 0.6 % Final   • Neutrophils, Absolute 12/23/2017 4.12  1.50 - 8.30 10*3/mm3 Final   • Lymphocytes, Absolute  12/23/2017 1.10  0.60 - 4.80 10*3/mm3 Final   • Monocytes, Absolute 12/23/2017 0.82  0.00 - 1.00 10*3/mm3 Final   • Eosinophils, Absolute 12/23/2017 0.18  0.00 - 0.30 10*3/mm3 Final   • Basophils, Absolute 12/23/2017 0.01  0.00 - 0.20 10*3/mm3 Final   • Immature Grans, Absolute 12/23/2017 0.04* 0.00 - 0.03 10*3/mm3 Final   • Glucose 12/23/2017 81  70 - 100 mg/dL Final   • BUN 12/23/2017 12  9 - 23 mg/dL Final   • Creatinine 12/23/2017 0.70  0.60 - 1.30 mg/dL Final   • Sodium 12/23/2017 136  132 - 146 mmol/L Final   • Potassium 12/23/2017 4.3  3.5 - 5.5 mmol/L Final   • Chloride 12/23/2017 101  99 - 109 mmol/L Final   • CO2 12/23/2017 26.0  20.0 - 31.0 mmol/L Final   • Calcium 12/23/2017 9.8  8.7 - 10.4 mg/dL Final   • Total Protein 12/23/2017 7.7  5.7 - 8.2 g/dL Final   • Albumin 12/23/2017 4.50  3.20 - 4.80 g/dL Final   • ALT (SGPT) 12/23/2017 20  7 - 40 U/L Final   • AST (SGOT) 12/23/2017 19  0 - 33 U/L Final   • Alkaline Phosphatase 12/23/2017 89  25 - 100 U/L Final   • Total Bilirubin 12/23/2017 0.3  0.3 - 1.2 mg/dL Final   • eGFR Non  Amer 12/23/2017 91  >60 mL/min/1.73 Final   • Globulin 12/23/2017 3.2  gm/dL Final   • A/G Ratio 12/23/2017 1.4* 1.5 - 2.5 g/dL Final   • BUN/Creatinine Ratio 12/23/2017 17.1  7.0 - 25.0 Final   • Anion Gap 12/23/2017 9.0  3.0 - 11.0 mmol/L Final         Assessment/Plan     Problem List Items Addressed This Visit        Cardiovascular and Mediastinum    Migraine with aura - Primary    Current Assessment & Plan     Headaches are improving with treatment.  Medication changes per orders.             Relevant Medications    traZODone (DESYREL) 50 MG tablet    amitriptyline (ELAVIL) 10 MG tablet    acetaminophen (TYLENOL) 500 MG tablet    venlafaxine XR (EFFEXOR XR) 75 MG 24 hr capsule    topiramate (TOPAMAX) 100 MG tablet             No Follow-up on file.

## 2018-04-23 ENCOUNTER — TELEPHONE (OUTPATIENT)
Dept: NEUROLOGY | Facility: CLINIC | Age: 43
End: 2018-04-23

## 2018-04-23 NOTE — TELEPHONE ENCOUNTER
----- Message from Kiki Alanis sent at 4/23/2018  3:38 PM EDT -----  Regarding: TOPAMAX DOSAGE  Contact: [phone]  Patient request to increase topamax 100 mg to help with headaches.

## 2018-04-23 NOTE — TELEPHONE ENCOUNTER
----- Message from Kiki Alanis sent at 4/23/2018  3:38 PM EDT -----  Regarding: TOPAMAX DOSAGE  Contact: [phone]  Patient request to increase topamax 100 mg to help with headaches.

## 2018-04-24 ENCOUNTER — TELEPHONE (OUTPATIENT)
Dept: NEUROLOGY | Facility: CLINIC | Age: 43
End: 2018-04-24

## 2018-04-24 RX ORDER — TOPIRAMATE 100 MG/1
100 TABLET, FILM COATED ORAL 2 TIMES DAILY
Qty: 60 TABLET | Refills: 11 | Status: SHIPPED | OUTPATIENT
Start: 2018-04-24 | End: 2018-04-24 | Stop reason: SDUPTHER

## 2018-04-24 NOTE — TELEPHONE ENCOUNTER
----- Message from Kiki Alanis sent at 4/24/2018 11:27 AM EDT -----  Regarding: TOPAMEX  Contact: [phone]  Please increase topamax per patient request. Patient is currently taking 100 mg.

## 2018-04-25 NOTE — TELEPHONE ENCOUNTER
PATIENT CALLED BACK, SPOKE WITH THE PATIENT, PATIENT IS AWARE OF THE INCREASE AND THE NEW SCRIPT SENT IN. PATIENT VERBALIZED UNDERSTANDING.

## 2018-06-19 ENCOUNTER — OFFICE VISIT (OUTPATIENT)
Dept: NEUROLOGY | Facility: CLINIC | Age: 43
End: 2018-06-19

## 2018-06-19 VITALS
RESPIRATION RATE: 18 BRPM | SYSTOLIC BLOOD PRESSURE: 100 MMHG | HEART RATE: 84 BPM | DIASTOLIC BLOOD PRESSURE: 68 MMHG | WEIGHT: 148 LBS | BODY MASS INDEX: 23.78 KG/M2 | OXYGEN SATURATION: 100 % | HEIGHT: 66 IN

## 2018-06-19 DIAGNOSIS — R20.0 NUMBNESS AND TINGLING OF BOTH FEET: ICD-10-CM

## 2018-06-19 DIAGNOSIS — R20.2 NUMBNESS AND TINGLING OF BOTH FEET: ICD-10-CM

## 2018-06-19 DIAGNOSIS — G43.111 INTRACTABLE MIGRAINE WITH AURA WITH STATUS MIGRAINOSUS: Primary | ICD-10-CM

## 2018-06-19 PROCEDURE — 99213 OFFICE O/P EST LOW 20 MIN: CPT | Performed by: PSYCHIATRY & NEUROLOGY

## 2018-06-19 RX ORDER — POTASSIUM CHLORIDE 20 MEQ/1
20 TABLET, EXTENDED RELEASE ORAL 2 TIMES DAILY
Qty: 60 TABLET | Refills: 11 | Status: SHIPPED | OUTPATIENT
Start: 2018-06-19 | End: 2019-06-19

## 2018-06-19 NOTE — PROGRESS NOTES
Subjective   Patient ID: Silvia Tracy is a 43 y.o. female     Chief Complaint   Patient presents with   • Migraine      Left sided weakness      History of Present Illness    43 y.o. woman returns in follow up for complex migraines.  Last visit on 2/19/18 continued TPM.      Called on 4/23/18 requesting increase in TPM and new dosage of 200 mg BID sent into pharmacy.     Increasing HA behind OS throbbing quality for one week prior to increasing TPM.    Since increasing no HA sx.  No further left sided weakness.  Episodes of vibrating and tingling in feet > hands.  Lasts for 5 minutes then stops.  Moderate to severe intensity.  Frequency is 3 to 4 weeks a week.      Problem history:    Received flu shot in 10/2016 developed pain in left arm and then left leg had dull ache and weakness.  Treated with steroids and sx improved for a few weeks.      Develops ache behind eye, left arm and leg have dull ache and weakness.  Sx last until she gets steroids.  Sx get worse with being up and fatigue.  Rheumatology did not find a diagnosis. GBP of no benefit.      Dr Person prescribed prednisone.      Reviewed [facility] and Dr Person records:  PMH of anemia, CHF, recurrent C diff colitis s/p fecal transplant.     Admitted 10/27/16 - 10/29/16 for left sided weakness after a flu shot. Dx with complex migraines    MRI Brain/Cervical/Thoracic, my review of films, few scattered tiny T2 white hyper intensities  EMG/NCS - mild sensory neuropathy    Readmitted 11/15/17 - 11/19/17 for persistent left sided weakness.  + DWIGHT.  Treated with steroids and has had improved sx.   LP - 11/11/16 WBC 4, RBC 0, Gluc 52, prot 20.  Cx neg    LP 12/20/17 - prot 18, gluc 68, WBC 1, RBC 1, 0 OCB    RNP ab neg,   Past Medical History:   Diagnosis Date   • Acute congestive heart failure    • DWIGHT positive    • Anemia    • Anxiety    • History of Clostridium difficile     stool transplant 08/16   • History of transfusion    • Migraine    • Numbness and  "tingling of left leg      Family History   Problem Relation Age of Onset   • Scleroderma Mother    • Coronary artery disease Father    • COPD Father      Social History     Social History   • Marital status:      Social History Main Topics   • Smoking status: Never Smoker   • Alcohol use 0.6 oz/week     1 Glasses of wine per week   • Drug use: No   • Sexual activity: Defer     Other Topics Concern   • Not on file       Review of Systems   Constitutional: Positive for fatigue. Negative for activity change and unexpected weight change.   HENT: Negative for tinnitus and trouble swallowing.    Eyes: Negative for photophobia and visual disturbance.   Respiratory: Negative for apnea, cough and choking.    Cardiovascular: Negative for leg swelling.   Gastrointestinal: Negative for nausea and vomiting.   Endocrine: Negative for cold intolerance and heat intolerance.   Genitourinary: Negative for difficulty urinating, frequency, menstrual problem and urgency.   Musculoskeletal: Negative for back pain, gait problem, myalgias and neck pain.   Skin: Negative for color change and rash.   Allergic/Immunologic: Negative for immunocompromised state.   Neurological: Positive for weakness and headaches. Negative for dizziness, tremors, seizures, syncope, facial asymmetry, speech difficulty, light-headedness and numbness.   Hematological: Negative for adenopathy. Does not bruise/bleed easily.   Psychiatric/Behavioral: Negative for behavioral problems, confusion, decreased concentration, hallucinations and sleep disturbance.       Objective     Vitals:    06/19/18 1124   BP: 100/68   BP Location: Right arm   Patient Position: Sitting   Cuff Size: Adult   Pulse: 84   Resp: 18   SpO2: 100%   Weight: 67.1 kg (148 lb)   Height: 167.6 cm (66\")       Neurologic Exam     Mental Status   Oriented to person, place, and time.   Attention: normal. Concentration: normal.   Speech: speech is normal   Level of consciousness: alert  Knowledge: " good and consistent with education.   Normal comprehension.     Cranial Nerves     CN II   Visual fields full to confrontation.   Visual acuity: normal  Right visual field deficit: none  Left visual field deficit: none     CN III, IV, VI   Pupils are equal, round, and reactive to light.  Extraocular motions are normal.   Nystagmus: none   Diplopia: none  Ophthalmoparesis: none  Upgaze: normal  Downgaze: normal  Conjugate gaze: present    CN V   Facial sensation intact.   Right corneal reflex: normal  Left corneal reflex: normal    CN VII   Right facial weakness: none  Left facial weakness: none    CN VIII   Hearing: intact    CN IX, X   Palate: symmetric  Right gag reflex: normal  Left gag reflex: normal    CN XI   Right sternocleidomastoid strength: normal  Left sternocleidomastoid strength: normal    CN XII   Tongue: not atrophic  Fasciculations: absent  Tongue deviation: none    Motor Exam   Muscle bulk: normal  Overall muscle tone: normal  Right arm tone: normal  Left arm tone: normal  Right leg tone: normal  Left leg tone: normal    Strength   Strength 5/5 throughout.     Sensory Exam   Light touch normal.     Gait, Coordination, and Reflexes     Gait  Gait: normal    Coordination   Finger to nose coordination: normal    Tremor   Resting tremor: absent  Intention tremor: absent  Action tremor: absent    Reflexes   Reflexes 2+ except as noted.       Physical Exam   Constitutional: She is oriented to person, place, and time. She appears well-developed and well-nourished.   Eyes: EOM are normal. Pupils are equal, round, and reactive to light.   Neurological: She is oriented to person, place, and time. She has normal strength. She has a normal Finger-Nose-Finger Test. Gait normal.   Psychiatric: Her speech is normal.   Nursing note and vitals reviewed.      Admission on 12/23/2017, Discharged on 12/23/2017   Component Date Value Ref Range Status   • Influenza A Ag, EIA 12/23/2017 Negative  Negative Final   •  Influenza B Ag, EIA 12/23/2017 Positive* Negative Final   • Extra Tube 12/23/2017 hold for add-on   Final    Auto resulted   • Extra Tube 12/23/2017 Hold for add-ons.   Final    Auto resulted.   • Extra Tube 12/23/2017 hold for add-on   Final    Auto resulted   • Extra Tube 12/23/2017 Hold for add-ons.   Final    Auto resulted.   • Color, UA 12/23/2017 Yellow  Yellow, Straw Final   • Appearance, UA 12/23/2017 Clear  Clear Final   • pH, UA 12/23/2017 7.5  5.0 - 8.0 Final   • Specific Gravity, UA 12/23/2017 1.006  1.001 - 1.030 Final   • Glucose, UA 12/23/2017 Negative  Negative Final   • Ketones, UA 12/23/2017 Negative  Negative Final   • Bilirubin, UA 12/23/2017 Negative  Negative Final   • Blood, UA 12/23/2017 Negative  Negative Final   • Protein, UA 12/23/2017 Negative  Negative Final   • Leuk Esterase, UA 12/23/2017 Negative  Negative Final   • Nitrite, UA 12/23/2017 Negative  Negative Final   • Urobilinogen, UA 12/23/2017 0.2 E.U./dL  0.2 - 1.0 E.U./dL Final   • Blood Culture 12/23/2017 No growth at 5 days   Final   • Blood Culture 12/23/2017 No growth at 5 days   Final   • WBC 12/23/2017 6.27  3.50 - 10.80 10*3/mm3 Final   • RBC 12/23/2017 4.57  3.89 - 5.14 10*6/mm3 Final   • Hemoglobin 12/23/2017 14.4  11.5 - 15.5 g/dL Final   • Hematocrit 12/23/2017 41.8  34.5 - 44.0 % Final   • MCV 12/23/2017 91.5  80.0 - 99.0 fL Final   • MCH 12/23/2017 31.5* 27.0 - 31.0 pg Final   • MCHC 12/23/2017 34.4  32.0 - 36.0 g/dL Final   • RDW 12/23/2017 15.8* 11.3 - 14.5 % Final   • RDW-SD 12/23/2017 52.8  37.0 - 54.0 fl Final   • MPV 12/23/2017 10.1  6.0 - 12.0 fL Final   • Platelets 12/23/2017 161  150 - 450 10*3/mm3 Final   • Neutrophil % 12/23/2017 65.7  41.0 - 71.0 % Final   • Lymphocyte % 12/23/2017 17.5* 24.0 - 44.0 % Final   • Monocyte % 12/23/2017 13.1* 0.0 - 12.0 % Final   • Eosinophil % 12/23/2017 2.9  0.0 - 3.0 % Final   • Basophil % 12/23/2017 0.2  0.0 - 1.0 % Final   • Immature Grans % 12/23/2017 0.6  0.0 - 0.6 %  Final   • Neutrophils, Absolute 12/23/2017 4.12  1.50 - 8.30 10*3/mm3 Final   • Lymphocytes, Absolute 12/23/2017 1.10  0.60 - 4.80 10*3/mm3 Final   • Monocytes, Absolute 12/23/2017 0.82  0.00 - 1.00 10*3/mm3 Final   • Eosinophils, Absolute 12/23/2017 0.18  0.00 - 0.30 10*3/mm3 Final   • Basophils, Absolute 12/23/2017 0.01  0.00 - 0.20 10*3/mm3 Final   • Immature Grans, Absolute 12/23/2017 0.04* 0.00 - 0.03 10*3/mm3 Final   • Glucose 12/23/2017 81  70 - 100 mg/dL Final   • BUN 12/23/2017 12  9 - 23 mg/dL Final   • Creatinine 12/23/2017 0.70  0.60 - 1.30 mg/dL Final   • Sodium 12/23/2017 136  132 - 146 mmol/L Final   • Potassium 12/23/2017 4.3  3.5 - 5.5 mmol/L Final   • Chloride 12/23/2017 101  99 - 109 mmol/L Final   • CO2 12/23/2017 26.0  20.0 - 31.0 mmol/L Final   • Calcium 12/23/2017 9.8  8.7 - 10.4 mg/dL Final   • Total Protein 12/23/2017 7.7  5.7 - 8.2 g/dL Final   • Albumin 12/23/2017 4.50  3.20 - 4.80 g/dL Final   • ALT (SGPT) 12/23/2017 20  7 - 40 U/L Final   • AST (SGOT) 12/23/2017 19  0 - 33 U/L Final   • Alkaline Phosphatase 12/23/2017 89  25 - 100 U/L Final   • Total Bilirubin 12/23/2017 0.3  0.3 - 1.2 mg/dL Final   • eGFR Non  Amer 12/23/2017 91  >60 mL/min/1.73 Final   • Globulin 12/23/2017 3.2  gm/dL Final   • A/G Ratio 12/23/2017 1.4* 1.5 - 2.5 g/dL Final   • BUN/Creatinine Ratio 12/23/2017 17.1  7.0 - 25.0 Final   • Anion Gap 12/23/2017 9.0  3.0 - 11.0 mmol/L Final         Assessment/Plan     Problem List Items Addressed This Visit        Cardiovascular and Mediastinum    Migraine with aura - Primary    Current Assessment & Plan     Headaches are improving with treatment.  Continue current treatment regimen.     Add K-dur for N/T in feet and hands              Relevant Medications    amitriptyline (ELAVIL) 10 MG tablet    acetaminophen (TYLENOL) 500 MG tablet    topiramate (TOPAMAX) 200 MG tablet       Nervous and Auditory    Numbness and tingling of both feet    Current Assessment & Plan      Add K-dur 20 meq BID                   No Follow-up on file.

## 2018-06-19 NOTE — ASSESSMENT & PLAN NOTE
Headaches are improving with treatment.  Continue current treatment regimen.     Add K-dur for N/T in feet and hands

## 2018-08-29 ENCOUNTER — APPOINTMENT (OUTPATIENT)
Dept: MRI IMAGING | Facility: HOSPITAL | Age: 43
End: 2018-08-29

## 2018-08-29 ENCOUNTER — APPOINTMENT (OUTPATIENT)
Dept: GENERAL RADIOLOGY | Facility: HOSPITAL | Age: 43
End: 2018-08-29

## 2018-08-29 ENCOUNTER — HOSPITAL ENCOUNTER (EMERGENCY)
Facility: HOSPITAL | Age: 43
Discharge: HOME | End: 2018-08-29
Attending: EMERGENCY MEDICINE | Admitting: EMERGENCY MEDICINE

## 2018-08-29 VITALS
OXYGEN SATURATION: 98 % | WEIGHT: 142 LBS | HEIGHT: 66 IN | DIASTOLIC BLOOD PRESSURE: 80 MMHG | SYSTOLIC BLOOD PRESSURE: 113 MMHG | TEMPERATURE: 98.3 F | BODY MASS INDEX: 22.82 KG/M2 | HEART RATE: 115 BPM | RESPIRATION RATE: 16 BRPM

## 2018-08-29 DIAGNOSIS — Z87.39 HISTORY OF FIBROMYALGIA: ICD-10-CM

## 2018-08-29 DIAGNOSIS — R51.9 NONINTRACTABLE HEADACHE, UNSPECIFIED CHRONICITY PATTERN, UNSPECIFIED HEADACHE TYPE: ICD-10-CM

## 2018-08-29 DIAGNOSIS — R20.2 FACIAL PARESTHESIA: Primary | ICD-10-CM

## 2018-08-29 LAB
ALBUMIN SERPL-MCNC: 4.52 G/DL (ref 3.2–4.8)
ALBUMIN/GLOB SERPL: 1.7 G/DL (ref 1.5–2.5)
ALP SERPL-CCNC: 50 U/L (ref 25–100)
ALT SERPL W P-5'-P-CCNC: 30 U/L (ref 7–40)
ANION GAP SERPL CALCULATED.3IONS-SCNC: 5 MMOL/L (ref 3–11)
AST SERPL-CCNC: 30 U/L (ref 0–33)
B-HCG UR QL: NEGATIVE
BACTERIA UR QL AUTO: ABNORMAL /HPF
BASOPHILS # BLD AUTO: 0 10*3/MM3 (ref 0–0.2)
BASOPHILS NFR BLD AUTO: 0 % (ref 0–1)
BILIRUB SERPL-MCNC: 0.2 MG/DL (ref 0.3–1.2)
BILIRUB UR QL STRIP: NEGATIVE
BUN BLD-MCNC: 17 MG/DL (ref 9–23)
BUN/CREAT SERPL: 20.7 (ref 7–25)
CALCIUM SPEC-SCNC: 9.1 MG/DL (ref 8.7–10.4)
CHLORIDE SERPL-SCNC: 109 MMOL/L (ref 99–109)
CLARITY UR: CLEAR
CO2 SERPL-SCNC: 24 MMOL/L (ref 20–31)
COLOR UR: YELLOW
CREAT BLD-MCNC: 0.82 MG/DL (ref 0.6–1.3)
DEPRECATED RDW RBC AUTO: 46.7 FL (ref 37–54)
EOSINOPHIL # BLD AUTO: 0 10*3/MM3 (ref 0–0.3)
EOSINOPHIL NFR BLD AUTO: 0 % (ref 0–3)
ERYTHROCYTE [DISTWIDTH] IN BLOOD BY AUTOMATED COUNT: 13.3 % (ref 11.3–14.5)
GFR SERPL CREATININE-BSD FRML MDRD: 75 ML/MIN/1.73
GLOBULIN UR ELPH-MCNC: 2.7 GM/DL
GLUCOSE BLD-MCNC: 124 MG/DL (ref 70–100)
GLUCOSE UR STRIP-MCNC: NEGATIVE MG/DL
HCT VFR BLD AUTO: 39.1 % (ref 34.5–44)
HGB BLD-MCNC: 12.6 G/DL (ref 11.5–15.5)
HGB UR QL STRIP.AUTO: ABNORMAL
HYALINE CASTS UR QL AUTO: ABNORMAL /LPF
IMM GRANULOCYTES # BLD: 0.03 10*3/MM3 (ref 0–0.03)
IMM GRANULOCYTES NFR BLD: 0.4 % (ref 0–0.6)
KETONES UR QL STRIP: NEGATIVE
LEUKOCYTE ESTERASE UR QL STRIP.AUTO: ABNORMAL
LYMPHOCYTES # BLD AUTO: 1.39 10*3/MM3 (ref 0.6–4.8)
LYMPHOCYTES NFR BLD AUTO: 18.1 % (ref 24–44)
MCH RBC QN AUTO: 31.2 PG (ref 27–31)
MCHC RBC AUTO-ENTMCNC: 32.2 G/DL (ref 32–36)
MCV RBC AUTO: 96.8 FL (ref 80–99)
MONOCYTES # BLD AUTO: 0.65 10*3/MM3 (ref 0–1)
MONOCYTES NFR BLD AUTO: 8.5 % (ref 0–12)
NEUTROPHILS # BLD AUTO: 5.59 10*3/MM3 (ref 1.5–8.3)
NEUTROPHILS NFR BLD AUTO: 73 % (ref 41–71)
NITRITE UR QL STRIP: NEGATIVE
PH UR STRIP.AUTO: 7 [PH] (ref 5–8)
PLATELET # BLD AUTO: 242 10*3/MM3 (ref 150–450)
PMV BLD AUTO: 11 FL (ref 6–12)
POTASSIUM BLD-SCNC: 5 MMOL/L (ref 3.5–5.5)
PROT SERPL-MCNC: 7.2 G/DL (ref 5.7–8.2)
PROT UR QL STRIP: NEGATIVE
RBC # BLD AUTO: 4.04 10*6/MM3 (ref 3.89–5.14)
RBC # UR: ABNORMAL /HPF
REF LAB TEST METHOD: ABNORMAL
SODIUM BLD-SCNC: 138 MMOL/L (ref 132–146)
SP GR UR STRIP: 1.01 (ref 1–1.03)
SQUAMOUS #/AREA URNS HPF: ABNORMAL /HPF
TROPONIN I SERPL-MCNC: 0 NG/ML (ref 0–0.07)
TROPONIN I SERPL-MCNC: 0 NG/ML (ref 0–0.07)
UROBILINOGEN UR QL STRIP: ABNORMAL
WBC NRBC COR # BLD: 7.66 10*3/MM3 (ref 3.5–10.8)
WBC UR QL AUTO: ABNORMAL /HPF

## 2018-08-29 PROCEDURE — 80053 COMPREHEN METABOLIC PANEL: CPT | Performed by: NURSE PRACTITIONER

## 2018-08-29 PROCEDURE — 70544 MR ANGIOGRAPHY HEAD W/O DYE: CPT

## 2018-08-29 PROCEDURE — 99284 EMERGENCY DEPT VISIT MOD MDM: CPT

## 2018-08-29 PROCEDURE — 93005 ELECTROCARDIOGRAM TRACING: CPT | Performed by: NURSE PRACTITIONER

## 2018-08-29 PROCEDURE — 25010000002 PROCHLORPERAZINE EDISYLATE PER 10 MG: Performed by: NURSE PRACTITIONER

## 2018-08-29 PROCEDURE — 70551 MRI BRAIN STEM W/O DYE: CPT

## 2018-08-29 PROCEDURE — 87086 URINE CULTURE/COLONY COUNT: CPT | Performed by: NURSE PRACTITIONER

## 2018-08-29 PROCEDURE — 25010000002 KETOROLAC TROMETHAMINE PER 15 MG: Performed by: NURSE PRACTITIONER

## 2018-08-29 PROCEDURE — 81001 URINALYSIS AUTO W/SCOPE: CPT | Performed by: NURSE PRACTITIONER

## 2018-08-29 PROCEDURE — 70548 MR ANGIOGRAPHY NECK W/DYE: CPT

## 2018-08-29 PROCEDURE — 96375 TX/PRO/DX INJ NEW DRUG ADDON: CPT

## 2018-08-29 PROCEDURE — 84484 ASSAY OF TROPONIN QUANT: CPT

## 2018-08-29 PROCEDURE — 0 GADOBENATE DIMEGLUMINE 529 MG/ML SOLUTION: Performed by: EMERGENCY MEDICINE

## 2018-08-29 PROCEDURE — A9577 INJ MULTIHANCE: HCPCS | Performed by: EMERGENCY MEDICINE

## 2018-08-29 PROCEDURE — 81025 URINE PREGNANCY TEST: CPT | Performed by: EMERGENCY MEDICINE

## 2018-08-29 PROCEDURE — 71045 X-RAY EXAM CHEST 1 VIEW: CPT

## 2018-08-29 PROCEDURE — 96374 THER/PROPH/DIAG INJ IV PUSH: CPT

## 2018-08-29 PROCEDURE — 85025 COMPLETE CBC W/AUTO DIFF WBC: CPT | Performed by: NURSE PRACTITIONER

## 2018-08-29 PROCEDURE — 96361 HYDRATE IV INFUSION ADD-ON: CPT

## 2018-08-29 RX ORDER — DIPHENHYDRAMINE HYDROCHLORIDE 50 MG/ML
25 INJECTION INTRAMUSCULAR; INTRAVENOUS ONCE
Status: DISCONTINUED | OUTPATIENT
Start: 2018-08-29 | End: 2018-08-29 | Stop reason: HOSPADM

## 2018-08-29 RX ORDER — KETOROLAC TROMETHAMINE 15 MG/ML
15 INJECTION, SOLUTION INTRAMUSCULAR; INTRAVENOUS ONCE
Status: COMPLETED | OUTPATIENT
Start: 2018-08-29 | End: 2018-08-29

## 2018-08-29 RX ORDER — SODIUM CHLORIDE 0.9 % (FLUSH) 0.9 %
10 SYRINGE (ML) INJECTION AS NEEDED
Status: DISCONTINUED | OUTPATIENT
Start: 2018-08-29 | End: 2018-08-29 | Stop reason: HOSPADM

## 2018-08-29 RX ADMIN — GADOBENATE DIMEGLUMINE 13 ML: 529 INJECTION, SOLUTION INTRAVENOUS at 20:10

## 2018-08-29 RX ADMIN — PROCHLORPERAZINE EDISYLATE 10 MG: 5 INJECTION INTRAMUSCULAR; INTRAVENOUS at 21:05

## 2018-08-29 RX ADMIN — SODIUM CHLORIDE 1000 ML: 9 INJECTION, SOLUTION INTRAVENOUS at 18:31

## 2018-08-29 RX ADMIN — KETOROLAC TROMETHAMINE 15 MG: 15 INJECTION, SOLUTION INTRAMUSCULAR; INTRAVENOUS at 21:04

## 2018-08-30 ENCOUNTER — TELEPHONE (OUTPATIENT)
Dept: NEUROLOGY | Facility: CLINIC | Age: 43
End: 2018-08-30

## 2018-08-30 RX ORDER — ZONISAMIDE 100 MG/1
100 CAPSULE ORAL DAILY
Qty: 30 CAPSULE | Refills: 5 | Status: SHIPPED | OUTPATIENT
Start: 2018-08-30 | End: 2018-09-11 | Stop reason: SDUPTHER

## 2018-08-30 NOTE — TELEPHONE ENCOUNTER
----- Message from Kikidiana Alanis sent at 8/30/2018 10:09 AM EDT -----  Regarding: migraine  Contact: [phone]  Patient states she has had a migraine for a couple of weeks. She request her topamax increased.    [facility] [place]

## 2018-08-30 NOTE — TELEPHONE ENCOUNTER
Called patient, spoke with the patient, patient is aware of the provider recommendations. Verbalized understanding.

## 2018-08-31 LAB — BACTERIA SPEC AEROBE CULT: NORMAL

## 2018-09-11 ENCOUNTER — OFFICE VISIT (OUTPATIENT)
Dept: NEUROLOGY | Facility: CLINIC | Age: 43
End: 2018-09-11

## 2018-09-11 VITALS
HEART RATE: 89 BPM | RESPIRATION RATE: 16 BRPM | DIASTOLIC BLOOD PRESSURE: 64 MMHG | HEIGHT: 66 IN | SYSTOLIC BLOOD PRESSURE: 112 MMHG | WEIGHT: 145 LBS | BODY MASS INDEX: 23.3 KG/M2 | OXYGEN SATURATION: 99 %

## 2018-09-11 DIAGNOSIS — G43.111 INTRACTABLE MIGRAINE WITH AURA WITH STATUS MIGRAINOSUS: Primary | ICD-10-CM

## 2018-09-11 PROCEDURE — 99213 OFFICE O/P EST LOW 20 MIN: CPT | Performed by: PSYCHIATRY & NEUROLOGY

## 2018-09-11 RX ORDER — ZONISAMIDE 100 MG/1
300 CAPSULE ORAL DAILY
Qty: 90 CAPSULE | Refills: 5 | OUTPATIENT
Start: 2018-09-11 | End: 2020-05-31 | Stop reason: HOSPADM

## 2018-09-11 RX ORDER — SUMATRIPTAN 100 MG/1
100 TABLET, FILM COATED ORAL ONCE AS NEEDED
Qty: 12 TABLET | Refills: 5 | OUTPATIENT
Start: 2018-09-11 | End: 2020-05-31 | Stop reason: HOSPADM

## 2018-09-11 NOTE — PROGRESS NOTES
Subjective   Patient ID: Silvia Tracy is a 43 y.o. female     Chief Complaint   Patient presents with   • Migraine     2 toradol shots, patient states she has been sick for the last 4 weeks.       Left sided weakness      History of Present Illness    43 y.o. woman returns in follow up for complex migraines.  Last visit on 6/19/18 continued TPM and added K-Dur.      Presented to [facility] ED  8/29/18 for facial N/T.      MRI/MRA Brain, my review of films, few tiny T2 hyper intensities. MRA normal    Called on 8/30/18 requesting increase in TPM and added ZNG.      HA for 4 weeks.  Located behind left eye, left arm and leg.  Quality of dull heaviness.  Moderate intensity.  Sx started after returning to work.         Problem history:    Received flu shot in 10/2016 developed pain in left arm and then left leg had dull ache and weakness.  Treated with steroids and sx improved for a few weeks.      Develops ache behind eye, left arm and leg have dull ache and weakness.  Sx last until she gets steroids.  Sx get worse with being up and fatigue.  Rheumatology did not find a diagnosis. GBP/Lyrica of no benefit.      Dr Person prescribed prednisone.      Reviewed [facility] and Dr Person records:  PMH of anemia, CHF, recurrent C diff colitis s/p fecal transplant.     Admitted 10/27/16 - 10/29/16 for left sided weakness after a flu shot. Dx with complex migraines    MRI Brain/Cervical/Thoracic, my review of films, few scattered tiny T2 white hyper intensities  EMG/NCS - mild sensory neuropathy    Readmitted 11/15/17 - 11/19/17 for persistent left sided weakness.  + DWIGHT.  Treated with steroids and has had improved sx.   LP - 11/11/16 WBC 4, RBC 0, Gluc 52, prot 20.  Cx neg    LP 12/20/17 - prot 18, gluc 68, WBC 1, RBC 1, 0 OCB    RNP ab neg,   Past Medical History:   Diagnosis Date   • Acute congestive heart failure (CMS/HCC)    • DWIGHT positive    • Anemia    • Anxiety    • History of Clostridium difficile     stool transplant 08/16  "  • History of transfusion    • Migraine    • Numbness and tingling of left leg      Family History   Problem Relation Age of Onset   • Scleroderma Mother    • Coronary artery disease Father    • COPD Father      Social History     Social History   • Marital status:      Social History Main Topics   • Smoking status: Never Smoker   • Alcohol use 0.6 oz/week     1 Glasses of wine per week   • Drug use: No   • Sexual activity: Defer     Other Topics Concern   • Not on file       Review of Systems   Constitutional: Positive for fatigue. Negative for activity change and unexpected weight change.   HENT: Negative for trouble swallowing.    Eyes: Negative for visual disturbance.   Respiratory: Negative for apnea and choking.    Cardiovascular: Negative for leg swelling.   Endocrine: Negative for cold intolerance and heat intolerance.   Genitourinary: Negative for difficulty urinating, frequency, menstrual problem and urgency.   Musculoskeletal: Negative for gait problem and myalgias.   Skin: Negative for color change and rash.   Allergic/Immunologic: Negative for immunocompromised state.   Neurological: Positive for headaches. Negative for tremors, syncope, facial asymmetry, speech difficulty and light-headedness.   Hematological: Negative for adenopathy. Does not bruise/bleed easily.   Psychiatric/Behavioral: Negative for behavioral problems, confusion, decreased concentration, hallucinations and sleep disturbance.       Objective     Vitals:    09/11/18 0848   BP: 112/64   BP Location: Right arm   Patient Position: Sitting   Cuff Size: Adult   Pulse: 89   Resp: 16   SpO2: 99%   Weight: 65.8 kg (145 lb)   Height: 167.6 cm (66\")       Neurologic Exam     Mental Status   Oriented to person, place, and time.   Attention: normal. Concentration: normal.   Speech: speech is normal   Level of consciousness: alert  Knowledge: good and consistent with education.   Normal comprehension.     Cranial Nerves     CN II   Visual " fields full to confrontation.   Visual acuity: normal  Right visual field deficit: none  Left visual field deficit: none     CN III, IV, VI   Pupils are equal, round, and reactive to light.  Extraocular motions are normal.   Nystagmus: none   Diplopia: none  Ophthalmoparesis: none  Upgaze: normal  Downgaze: normal  Conjugate gaze: present    CN V   Facial sensation intact.   Right corneal reflex: normal  Left corneal reflex: normal    CN VII   Right facial weakness: none  Left facial weakness: none    CN VIII   Hearing: intact    CN IX, X   Palate: symmetric  Right gag reflex: normal  Left gag reflex: normal    CN XI   Right sternocleidomastoid strength: normal  Left sternocleidomastoid strength: normal    CN XII   Tongue: not atrophic  Fasciculations: absent  Tongue deviation: none    Motor Exam   Muscle bulk: normal  Overall muscle tone: normal  Right arm tone: normal  Left arm tone: normal  Right leg tone: normal  Left leg tone: normal    Strength   Strength 5/5 throughout.     Sensory Exam   Light touch normal.     Gait, Coordination, and Reflexes     Gait  Gait: normal    Coordination   Finger to nose coordination: normal    Tremor   Resting tremor: absent  Intention tremor: absent  Action tremor: absent    Reflexes   Reflexes 2+ except as noted.       Physical Exam   Constitutional: She is oriented to person, place, and time. She appears well-developed and well-nourished.   Eyes: Pupils are equal, round, and reactive to light. EOM are normal.   Neurological: She is oriented to person, place, and time. She has normal strength. She has a normal Finger-Nose-Finger Test. Gait normal.   Psychiatric: Her speech is normal.   Nursing note and vitals reviewed.      Admission on 08/29/2018, Discharged on 08/29/2018   Component Date Value Ref Range Status   • Glucose 08/29/2018 124* 70 - 100 mg/dL Final   • BUN 08/29/2018 17  9 - 23 mg/dL Final   • Creatinine 08/29/2018 0.82  0.60 - 1.30 mg/dL Final   • Sodium 08/29/2018  138  132 - 146 mmol/L Final   • Potassium 08/29/2018 5.0  3.5 - 5.5 mmol/L Final   • Chloride 08/29/2018 109  99 - 109 mmol/L Final   • CO2 08/29/2018 24.0  20.0 - 31.0 mmol/L Final   • Calcium 08/29/2018 9.1  8.7 - 10.4 mg/dL Final   • Total Protein 08/29/2018 7.2  5.7 - 8.2 g/dL Final   • Albumin 08/29/2018 4.52  3.20 - 4.80 g/dL Final   • ALT (SGPT) 08/29/2018 30  7 - 40 U/L Final   • AST (SGOT) 08/29/2018 30  0 - 33 U/L Final   • Alkaline Phosphatase 08/29/2018 50  25 - 100 U/L Final   • Total Bilirubin 08/29/2018 0.2* 0.3 - 1.2 mg/dL Final   • eGFR Non African Amer 08/29/2018 75  >60 mL/min/1.73 Final   • Globulin 08/29/2018 2.7  gm/dL Final   • A/G Ratio 08/29/2018 1.7  1.5 - 2.5 g/dL Final   • BUN/Creatinine Ratio 08/29/2018 20.7  7.0 - 25.0 Final   • Anion Gap 08/29/2018 5.0  3.0 - 11.0 mmol/L Final   • Color, UA 08/29/2018 Yellow  Yellow, Straw Final   • Appearance, UA 08/29/2018 Clear  Clear Final   • pH, UA 08/29/2018 7.0  5.0 - 8.0 Final   • Specific Gravity, UA 08/29/2018 1.011  1.001 - 1.030 Final   • Glucose, UA 08/29/2018 Negative  Negative Final   • Ketones, UA 08/29/2018 Negative  Negative Final   • Bilirubin, UA 08/29/2018 Negative  Negative Final   • Blood, UA 08/29/2018 Trace* Negative Final   • Protein, UA 08/29/2018 Negative  Negative Final   • Leuk Esterase, UA 08/29/2018 Moderate (2+)* Negative Final   • Nitrite, UA 08/29/2018 Negative  Negative Final   • Urobilinogen, UA 08/29/2018 0.2 E.U./dL  0.2 - 1.0 E.U./dL Final   • WBC 08/29/2018 7.66  3.50 - 10.80 10*3/mm3 Final   • RBC 08/29/2018 4.04  3.89 - 5.14 10*6/mm3 Final   • Hemoglobin 08/29/2018 12.6  11.5 - 15.5 g/dL Final   • Hematocrit 08/29/2018 39.1  34.5 - 44.0 % Final   • MCV 08/29/2018 96.8  80.0 - 99.0 fL Final   • MCH 08/29/2018 31.2* 27.0 - 31.0 pg Final   • MCHC 08/29/2018 32.2  32.0 - 36.0 g/dL Final   • RDW 08/29/2018 13.3  11.3 - 14.5 % Final   • RDW-SD 08/29/2018 46.7  37.0 - 54.0 fl Final   • MPV 08/29/2018 11.0  6.0 -  12.0 fL Final   • Platelets 08/29/2018 242  150 - 450 10*3/mm3 Final   • Neutrophil % 08/29/2018 73.0* 41.0 - 71.0 % Final   • Lymphocyte % 08/29/2018 18.1* 24.0 - 44.0 % Final   • Monocyte % 08/29/2018 8.5  0.0 - 12.0 % Final   • Eosinophil % 08/29/2018 0.0  0.0 - 3.0 % Final   • Basophil % 08/29/2018 0.0  0.0 - 1.0 % Final   • Immature Grans % 08/29/2018 0.4  0.0 - 0.6 % Final   • Neutrophils, Absolute 08/29/2018 5.59  1.50 - 8.30 10*3/mm3 Final   • Lymphocytes, Absolute 08/29/2018 1.39  0.60 - 4.80 10*3/mm3 Final   • Monocytes, Absolute 08/29/2018 0.65  0.00 - 1.00 10*3/mm3 Final   • Eosinophils, Absolute 08/29/2018 0.00  0.00 - 0.30 10*3/mm3 Final   • Basophils, Absolute 08/29/2018 0.00  0.00 - 0.20 10*3/mm3 Final   • Immature Grans, Absolute 08/29/2018 0.03  0.00 - 0.03 10*3/mm3 Final   • HCG, Urine QL 08/29/2018 Negative  Negative Final   • RBC, UA 08/29/2018 0-2  None Seen, 0-2 /HPF Final   • WBC, UA 08/29/2018 6-12* None Seen, 0-2 /HPF Final   • Bacteria, UA 08/29/2018 None Seen  None Seen, Trace /HPF Final   • Squamous Epithelial Cells, UA 08/29/2018 0-2  None Seen, 0-2 /HPF Final   • Hyaline Casts, UA 08/29/2018 None Seen  0 - 6 /LPF Final   • Methodology 08/29/2018 Automated Microscopy   Final   • Troponin I 08/29/2018 0.00  0.00 - 0.07 ng/mL Final    Serial Number: [id number]  [id number]   • Troponin I 08/29/2018 0.00  0.00 - 0.07 ng/mL Final    Serial Number: [id number]  [id number]   • Urine Culture 08/29/2018 60,000-70,000 CFU/mL Normal Urogenital Haylee   Final         Assessment/Plan     Problem List Items Addressed This Visit        Cardiovascular and Mediastinum    Migraine with aura - Primary    Current Assessment & Plan     Headaches are worsening.  Medication changes per orders.     Continue  mg BID  Increase  mg qhs     Add Imitrex 100 mg              Relevant Medications    amitriptyline (ELAVIL) 10 MG tablet    acetaminophen (TYLENOL) 500 MG tablet    topiramate (TOPAMAX)  200 MG tablet    zonisamide (ZONEGRAN) 100 MG capsule    SUMAtriptan (IMITREX) 100 MG tablet             No Follow-up on file.

## 2018-09-11 NOTE — ASSESSMENT & PLAN NOTE
Headaches are worsening.  Medication changes per orders.     Continue  mg BID  Increase  mg qhs     Add Imitrex 100 mg

## 2018-10-09 ENCOUNTER — TELEPHONE (OUTPATIENT)
Dept: NEUROLOGY | Facility: CLINIC | Age: 43
End: 2018-10-09

## 2018-10-09 DIAGNOSIS — G43.111 INTRACTABLE MIGRAINE WITH AURA WITH STATUS MIGRAINOSUS: Primary | ICD-10-CM

## 2018-10-09 NOTE — TELEPHONE ENCOUNTER
PLEASE ADVISE IF YOU WOULD LIKE FOR ME TO BRING THE PATIENT IN SOONER OR IS THERE SOMETHING YOU WANT TO DO BEFORE SHE COMES IN? THANKS!!

## 2018-10-09 NOTE — TELEPHONE ENCOUNTER
----- Message from Silvia Tracy sent at 10/9/2018 10:49 AM EDT -----  Regarding: Non-Urgent Medical Question  Contact: [phone]  I continue to struggle with my symptoms.  Specifically, the weakness on my left-side is worse and the burning sensation, bilaterally but worse on the left, in my muscles is a 7/10 on most days.   I am schedule to see you on October 29.  Do you have any appointments this week?    Silvia Tracy

## 2018-10-17 ENCOUNTER — OFFICE VISIT (OUTPATIENT)
Dept: NEUROLOGY | Facility: CLINIC | Age: 43
End: 2018-10-17

## 2018-10-17 VITALS
RESPIRATION RATE: 18 BRPM | HEIGHT: 66 IN | HEART RATE: 90 BPM | DIASTOLIC BLOOD PRESSURE: 70 MMHG | BODY MASS INDEX: 23.3 KG/M2 | SYSTOLIC BLOOD PRESSURE: 102 MMHG | WEIGHT: 145 LBS

## 2018-10-17 DIAGNOSIS — G43.009 MIGRAINE WITHOUT AURA AND WITHOUT STATUS MIGRAINOSUS, NOT INTRACTABLE: Primary | ICD-10-CM

## 2018-10-17 PROCEDURE — 99213 OFFICE O/P EST LOW 20 MIN: CPT | Performed by: PSYCHIATRY & NEUROLOGY

## 2018-10-17 NOTE — ASSESSMENT & PLAN NOTE
Headaches are worsening.  Medication changes per orders.     Given first dosage of Aimovig 70 mg sq     Continue TPM and ZNG

## 2018-10-17 NOTE — PROGRESS NOTES
Subjective   Patient ID: Silvia Tracy is a 43 y.o. female     Chief Complaint   Patient presents with   • Migraine      Left sided weakness      History of Present Illness    43 y.o. woman returns in follow up for complex migraines.  Last visit on 9/11/18 continued TPM and added K-Dur, increased  mg qhs and added Imitrex. .       MRI/MRA Brain, few tiny T2 hyper intensities. MRA normal    Called on 10/9/18 reporting increase left sided weakness and burning.      HA frequency is daily.  Located behind left eye, left arm and leg.  Quality of dull heaviness.  Moderate intensity.  No improvement with increasing ZNG.    Moderate intensity of 7/10 by end of day.    Sumatriptan helps for HA.      Preventative:  Elavil, TPM, ZNG, Sumatriptan       Problem history:    Received flu shot in 10/2016 developed pain in left arm and then left leg had dull ache and weakness.  Treated with steroids and sx improved for a few weeks.      Develops ache behind eye, left arm and leg have dull ache and weakness.  Sx last until she gets steroids.  Sx get worse with being up and fatigue.  Rheumatology did not find a diagnosis. GBP/Lyrica of no benefit.      Dr Person prescribed prednisone.      Reviewed [facility] and Dr Person records:  PMH of anemia, CHF, recurrent C diff colitis s/p fecal transplant.     Admitted 10/27/16 - 10/29/16 for left sided weakness after a flu shot. Dx with complex migraines    MRI Brain/Cervical/Thoracic, my review of films, few scattered tiny T2 white hyper intensities  EMG/NCS - mild sensory neuropathy    Readmitted 11/15/17 - 11/19/17 for persistent left sided weakness.  + DWIGHT.  Treated with steroids and has had improved sx.   LP - 11/11/16 WBC 4, RBC 0, Gluc 52, prot 20.  Cx neg    LP 12/20/17 - prot 18, gluc 68, WBC 1, RBC 1, 0 OCB    RNP ab neg,   Past Medical History:   Diagnosis Date   • Acute congestive heart failure (CMS/HCC)    • DWIGHT positive    • Anemia    • Anxiety    • History of  "Clostridium difficile     stool transplant 08/16   • History of transfusion    • Migraine    • Numbness and tingling of left leg      Family History   Problem Relation Age of Onset   • Scleroderma Mother    • Coronary artery disease Father    • COPD Father      Social History     Social History   • Marital status:      Social History Main Topics   • Smoking status: Never Smoker   • Alcohol use 0.6 oz/week     1 Glasses of wine per week   • Drug use: No   • Sexual activity: Defer     Other Topics Concern   • Not on file       Review of Systems   Constitutional: Positive for fatigue. Negative for activity change and unexpected weight change.   HENT: Negative for trouble swallowing.    Eyes: Negative for visual disturbance.   Respiratory: Negative for apnea and choking.    Cardiovascular: Negative for leg swelling.   Endocrine: Negative for cold intolerance and heat intolerance.   Genitourinary: Negative for difficulty urinating, frequency, menstrual problem and urgency.   Musculoskeletal: Negative for gait problem and myalgias.   Skin: Negative for color change and rash.   Allergic/Immunologic: Negative for immunocompromised state.   Neurological: Positive for headaches. Negative for tremors, syncope, facial asymmetry, speech difficulty and light-headedness.   Hematological: Negative for adenopathy. Does not bruise/bleed easily.   Psychiatric/Behavioral: Negative for behavioral problems, confusion, decreased concentration, hallucinations and sleep disturbance.       Objective     Vitals:    10/17/18 1002   BP: 102/70   BP Location: Right arm   Patient Position: Sitting   Cuff Size: Adult   Pulse: 90   Resp: 18   Weight: 65.8 kg (145 lb)   Height: 167.6 cm (66\")       Neurologic Exam     Mental Status   Oriented to person, place, and time.   Attention: normal. Concentration: normal.   Speech: speech is normal   Level of consciousness: alert  Knowledge: good and consistent with education.   Normal comprehension. "     Cranial Nerves     CN II   Visual fields full to confrontation.   Visual acuity: normal  Right visual field deficit: none  Left visual field deficit: none     CN III, IV, VI   Pupils are equal, round, and reactive to light.  Extraocular motions are normal.   Nystagmus: none   Diplopia: none  Ophthalmoparesis: none  Upgaze: normal  Downgaze: normal  Conjugate gaze: present    CN V   Facial sensation intact.   Right corneal reflex: normal  Left corneal reflex: normal    CN VII   Right facial weakness: none  Left facial weakness: none    CN VIII   Hearing: intact    CN IX, X   Palate: symmetric  Right gag reflex: normal  Left gag reflex: normal    CN XI   Right sternocleidomastoid strength: normal  Left sternocleidomastoid strength: normal    CN XII   Tongue: not atrophic  Fasciculations: absent  Tongue deviation: none    Motor Exam   Muscle bulk: normal  Overall muscle tone: normal  Right arm tone: normal  Left arm tone: normal  Right leg tone: normal  Left leg tone: normal    Strength   Strength 5/5 throughout.     Sensory Exam   Light touch normal.     Gait, Coordination, and Reflexes     Gait  Gait: normal    Coordination   Finger to nose coordination: normal    Tremor   Resting tremor: absent  Intention tremor: absent  Action tremor: absent    Reflexes   Reflexes 2+ except as noted.       Physical Exam   Constitutional: She is oriented to person, place, and time. She appears well-developed and well-nourished.   Eyes: Pupils are equal, round, and reactive to light. EOM are normal.   Neurological: She is oriented to person, place, and time. She has normal strength. She has a normal Finger-Nose-Finger Test. Gait normal.   Psychiatric: Her speech is normal.   Nursing note and vitals reviewed.      Admission on 08/29/2018, Discharged on 08/29/2018   Component Date Value Ref Range Status   • Glucose 08/29/2018 124* 70 - 100 mg/dL Final   • BUN 08/29/2018 17  9 - 23 mg/dL Final   • Creatinine 08/29/2018 0.82  0.60 -  1.30 mg/dL Final   • Sodium 08/29/2018 138  132 - 146 mmol/L Final   • Potassium 08/29/2018 5.0  3.5 - 5.5 mmol/L Final   • Chloride 08/29/2018 109  99 - 109 mmol/L Final   • CO2 08/29/2018 24.0  20.0 - 31.0 mmol/L Final   • Calcium 08/29/2018 9.1  8.7 - 10.4 mg/dL Final   • Total Protein 08/29/2018 7.2  5.7 - 8.2 g/dL Final   • Albumin 08/29/2018 4.52  3.20 - 4.80 g/dL Final   • ALT (SGPT) 08/29/2018 30  7 - 40 U/L Final   • AST (SGOT) 08/29/2018 30  0 - 33 U/L Final   • Alkaline Phosphatase 08/29/2018 50  25 - 100 U/L Final   • Total Bilirubin 08/29/2018 0.2* 0.3 - 1.2 mg/dL Final   • eGFR Non African Amer 08/29/2018 75  >60 mL/min/1.73 Final   • Globulin 08/29/2018 2.7  gm/dL Final   • A/G Ratio 08/29/2018 1.7  1.5 - 2.5 g/dL Final   • BUN/Creatinine Ratio 08/29/2018 20.7  7.0 - 25.0 Final   • Anion Gap 08/29/2018 5.0  3.0 - 11.0 mmol/L Final   • Color, UA 08/29/2018 Yellow  Yellow, Straw Final   • Appearance, UA 08/29/2018 Clear  Clear Final   • pH, UA 08/29/2018 7.0  5.0 - 8.0 Final   • Specific Gravity, UA 08/29/2018 1.011  1.001 - 1.030 Final   • Glucose, UA 08/29/2018 Negative  Negative Final   • Ketones, UA 08/29/2018 Negative  Negative Final   • Bilirubin, UA 08/29/2018 Negative  Negative Final   • Blood, UA 08/29/2018 Trace* Negative Final   • Protein, UA 08/29/2018 Negative  Negative Final   • Leuk Esterase, UA 08/29/2018 Moderate (2+)* Negative Final   • Nitrite, UA 08/29/2018 Negative  Negative Final   • Urobilinogen, UA 08/29/2018 0.2 E.U./dL  0.2 - 1.0 E.U./dL Final   • WBC 08/29/2018 7.66  3.50 - 10.80 10*3/mm3 Final   • RBC 08/29/2018 4.04  3.89 - 5.14 10*6/mm3 Final   • Hemoglobin 08/29/2018 12.6  11.5 - 15.5 g/dL Final   • Hematocrit 08/29/2018 39.1  34.5 - 44.0 % Final   • MCV 08/29/2018 96.8  80.0 - 99.0 fL Final   • MCH 08/29/2018 31.2* 27.0 - 31.0 pg Final   • MCHC 08/29/2018 32.2  32.0 - 36.0 g/dL Final   • RDW 08/29/2018 13.3  11.3 - 14.5 % Final   • RDW-SD 08/29/2018 46.7  37.0 - 54.0 fl  Final   • MPV 08/29/2018 11.0  6.0 - 12.0 fL Final   • Platelets 08/29/2018 242  150 - 450 10*3/mm3 Final   • Neutrophil % 08/29/2018 73.0* 41.0 - 71.0 % Final   • Lymphocyte % 08/29/2018 18.1* 24.0 - 44.0 % Final   • Monocyte % 08/29/2018 8.5  0.0 - 12.0 % Final   • Eosinophil % 08/29/2018 0.0  0.0 - 3.0 % Final   • Basophil % 08/29/2018 0.0  0.0 - 1.0 % Final   • Immature Grans % 08/29/2018 0.4  0.0 - 0.6 % Final   • Neutrophils, Absolute 08/29/2018 5.59  1.50 - 8.30 10*3/mm3 Final   • Lymphocytes, Absolute 08/29/2018 1.39  0.60 - 4.80 10*3/mm3 Final   • Monocytes, Absolute 08/29/2018 0.65  0.00 - 1.00 10*3/mm3 Final   • Eosinophils, Absolute 08/29/2018 0.00  0.00 - 0.30 10*3/mm3 Final   • Basophils, Absolute 08/29/2018 0.00  0.00 - 0.20 10*3/mm3 Final   • Immature Grans, Absolute 08/29/2018 0.03  0.00 - 0.03 10*3/mm3 Final   • HCG, Urine QL 08/29/2018 Negative  Negative Final   • RBC, UA 08/29/2018 0-2  None Seen, 0-2 /HPF Final   • WBC, UA 08/29/2018 6-12* None Seen, 0-2 /HPF Final   • Bacteria, UA 08/29/2018 None Seen  None Seen, Trace /HPF Final   • Squamous Epithelial Cells, UA 08/29/2018 0-2  None Seen, 0-2 /HPF Final   • Hyaline Casts, UA 08/29/2018 None Seen  0 - 6 /LPF Final   • Methodology 08/29/2018 Automated Microscopy   Final   • Troponin I 08/29/2018 0.00  0.00 - 0.07 ng/mL Final    Serial Number: [id number]:  [id number]   • Troponin I 08/29/2018 0.00  0.00 - 0.07 ng/mL Final    Serial Number: [id number]:  [id number]   • Urine Culture 08/29/2018 60,000-70,000 CFU/mL Normal Urogenital Haylee   Final         Assessment/Plan     Problem List Items Addressed This Visit        Cardiovascular and Mediastinum    Migraine without aura and without status migrainosus, not intractable - Primary    Current Assessment & Plan     Headaches are worsening.  Medication changes per orders.     Given first dosage of Aimovig 70 mg sq     Continue TPM and ZNG              Relevant Medications    amitriptyline (ELAVIL)  10 MG tablet    acetaminophen (TYLENOL) 500 MG tablet    topiramate (TOPAMAX) 200 MG tablet    zonisamide (ZONEGRAN) 100 MG capsule    SUMAtriptan (IMITREX) 100 MG tablet    Erenumab-aooe (AIMOVIG) 70 MG/ML solution auto-injector             No Follow-up on file.

## 2018-10-19 ENCOUNTER — TELEPHONE (OUTPATIENT)
Dept: NEUROLOGY | Facility: CLINIC | Age: 43
End: 2018-10-19

## 2018-10-19 ENCOUNTER — PATIENT MESSAGE (OUTPATIENT)
Dept: NEUROLOGY | Facility: CLINIC | Age: 43
End: 2018-10-19

## 2018-10-19 NOTE — TELEPHONE ENCOUNTER
----- Message from Silvia Tracy sent at 10/19/2018 11:45 AM EDT -----  Regarding: Visit Follow-Up Question  Contact: [phone]  On Wednesday, I received the migraine injection in the right leg.  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  When I arrived home, I noticed that both of my legs were swollen, including behind each of my knees (almost like a pillow of fluid).  The right knee is sore to the touch and feels like it would if it were a bruise.  Today, my legs are the same.      Should I be concerned or merely wait for it to pass as it is most likely a side effect?    Silvia Tracy

## 2018-10-22 ENCOUNTER — OFFICE VISIT (OUTPATIENT)
Dept: NEUROLOGY | Facility: CLINIC | Age: 43
End: 2018-10-22

## 2018-10-22 VITALS
RESPIRATION RATE: 18 BRPM | OXYGEN SATURATION: 99 % | BODY MASS INDEX: 23.46 KG/M2 | DIASTOLIC BLOOD PRESSURE: 74 MMHG | HEIGHT: 66 IN | SYSTOLIC BLOOD PRESSURE: 104 MMHG | HEART RATE: 94 BPM | WEIGHT: 146 LBS

## 2018-10-22 DIAGNOSIS — G43.009 MIGRAINE WITHOUT AURA AND WITHOUT STATUS MIGRAINOSUS, NOT INTRACTABLE: Primary | ICD-10-CM

## 2018-10-22 PROCEDURE — 99213 OFFICE O/P EST LOW 20 MIN: CPT | Performed by: PSYCHIATRY & NEUROLOGY

## 2018-10-22 RX ORDER — TRAMADOL HYDROCHLORIDE 50 MG/1
50 TABLET ORAL EVERY 6 HOURS PRN
Qty: 90 TABLET | Refills: 5 | OUTPATIENT
Start: 2018-10-22 | End: 2020-05-31 | Stop reason: HOSPADM

## 2018-10-22 RX ORDER — PREDNISONE 20 MG/1
TABLET ORAL
Qty: 12 TABLET | Refills: 0 | OUTPATIENT
Start: 2018-10-22 | End: 2020-05-31 | Stop reason: HOSPADM

## 2018-10-22 NOTE — PROGRESS NOTES
Subjective   Patient ID: Silvia Tracy is a 43 y.o. female     Chief Complaint   Patient presents with   • Migraine      Left sided weakness      History of Present Illness    43 y.o. woman returns in follow up for complex migraines.  Last visit on 10/17/18 continued TPM and added K-Dur,  mg qhs, Imitrex and given Aimovig.      Called on 10/19/18 reporting swelling in the bilateral LE.        Two days after injection developed pain in right leg, and LE swelling.  Entire body hurts.      Feels like she is having a fibro flare up.      Denies pain behind left eye.       Preventative:  Elavil, TPM, ZNG, Sumatriptan       Problem history:    Received flu shot in 10/2016 developed pain in left arm and then left leg had dull ache and weakness.  Treated with steroids and sx improved for a few weeks.      Develops ache behind eye, left arm and leg have dull ache and weakness.  Sx last until she gets steroids.  Sx get worse with being up and fatigue.  Rheumatology did not find a diagnosis. GBP/Lyrica of no benefit.      Dr Person prescribed prednisone.      Reviewed [facility] and Dr Person records:  PMH of anemia, CHF, recurrent C diff colitis s/p fecal transplant.     Admitted 10/27/16 - 10/29/16 for left sided weakness after a flu shot. Dx with complex migraines    MRI Brain/Cervical/Thoracic, my review of films, few scattered tiny T2 white hyper intensities  EMG/NCS - mild sensory neuropathy    Readmitted 11/15/17 - 11/19/17 for persistent left sided weakness.  + DWIGHT.  Treated with steroids and has had improved sx.   LP - 11/11/16 WBC 4, RBC 0, Gluc 52, prot 20.  Cx neg    LP 12/20/17 - prot 18, gluc 68, WBC 1, RBC 1, 0 OCB    RNP ab neg,   Past Medical History:   Diagnosis Date   • Acute congestive heart failure (CMS/HCC)    • DWIGHT positive    • Anemia    • Anxiety    • History of Clostridium difficile     stool transplant 08/16   • History of transfusion    • Migraine    • Numbness and tingling of left leg   "    Family History   Problem Relation Age of Onset   • Scleroderma Mother    • Coronary artery disease Father    • COPD Father      Social History     Social History   • Marital status:      Social History Main Topics   • Smoking status: Never Smoker   • Alcohol use 0.6 oz/week     1 Glasses of wine per week   • Drug use: No   • Sexual activity: Defer     Other Topics Concern   • Not on file       Review of Systems   Constitutional: Positive for fatigue. Negative for activity change and unexpected weight change.   HENT: Negative for trouble swallowing.    Eyes: Negative for visual disturbance.   Respiratory: Negative for apnea and choking.    Cardiovascular: Negative for leg swelling.   Endocrine: Negative for cold intolerance and heat intolerance.   Genitourinary: Negative for difficulty urinating, frequency, menstrual problem and urgency.   Musculoskeletal: Negative for gait problem and myalgias.   Skin: Negative for color change and rash.   Allergic/Immunologic: Negative for immunocompromised state.   Neurological: Positive for headaches. Negative for tremors, syncope, facial asymmetry, speech difficulty and light-headedness.   Hematological: Negative for adenopathy. Does not bruise/bleed easily.   Psychiatric/Behavioral: Negative for behavioral problems, confusion, decreased concentration, hallucinations and sleep disturbance.       Objective     Vitals:    10/22/18 1010   BP: 104/74   BP Location: Left arm   Patient Position: Sitting   Cuff Size: Adult   Pulse: 94   Resp: 18   SpO2: 99%   Weight: 66.2 kg (146 lb)   Height: 167.6 cm (66\")       Neurologic Exam     Mental Status   Oriented to person, place, and time.   Attention: normal. Concentration: normal.   Speech: speech is normal   Level of consciousness: alert  Knowledge: good and consistent with education.   Normal comprehension.     Cranial Nerves     CN II   Visual fields full to confrontation.   Visual acuity: normal  Right visual field " deficit: none  Left visual field deficit: none     CN III, IV, VI   Pupils are equal, round, and reactive to light.  Extraocular motions are normal.   Nystagmus: none   Diplopia: none  Ophthalmoparesis: none  Upgaze: normal  Downgaze: normal  Conjugate gaze: present    CN V   Facial sensation intact.   Right corneal reflex: normal  Left corneal reflex: normal    CN VII   Right facial weakness: none  Left facial weakness: none    CN VIII   Hearing: intact    CN IX, X   Palate: symmetric  Right gag reflex: normal  Left gag reflex: normal    CN XI   Right sternocleidomastoid strength: normal  Left sternocleidomastoid strength: normal    CN XII   Tongue: not atrophic  Fasciculations: absent  Tongue deviation: none    Motor Exam   Muscle bulk: normal  Overall muscle tone: normal  Right arm tone: normal  Left arm tone: normal  Right leg tone: normal  Left leg tone: normal    Strength   Strength 5/5 throughout.     Sensory Exam   Light touch normal.     Gait, Coordination, and Reflexes     Gait  Gait: normal    Coordination   Finger to nose coordination: normal    Tremor   Resting tremor: absent  Intention tremor: absent  Action tremor: absent    Reflexes   Reflexes 2+ except as noted.       Physical Exam   Constitutional: She is oriented to person, place, and time. She appears well-developed and well-nourished.   Eyes: Pupils are equal, round, and reactive to light. EOM are normal.   Neurological: She is oriented to person, place, and time. She has normal strength. She has a normal Finger-Nose-Finger Test. Gait normal.   Psychiatric: Her speech is normal.   Nursing note and vitals reviewed.      Admission on 08/29/2018, Discharged on 08/29/2018   Component Date Value Ref Range Status   • Glucose 08/29/2018 124* 70 - 100 mg/dL Final   • BUN 08/29/2018 17  9 - 23 mg/dL Final   • Creatinine 08/29/2018 0.82  0.60 - 1.30 mg/dL Final   • Sodium 08/29/2018 138  132 - 146 mmol/L Final   • Potassium 08/29/2018 5.0  3.5 - 5.5  mmol/L Final   • Chloride 08/29/2018 109  99 - 109 mmol/L Final   • CO2 08/29/2018 24.0  20.0 - 31.0 mmol/L Final   • Calcium 08/29/2018 9.1  8.7 - 10.4 mg/dL Final   • Total Protein 08/29/2018 7.2  5.7 - 8.2 g/dL Final   • Albumin 08/29/2018 4.52  3.20 - 4.80 g/dL Final   • ALT (SGPT) 08/29/2018 30  7 - 40 U/L Final   • AST (SGOT) 08/29/2018 30  0 - 33 U/L Final   • Alkaline Phosphatase 08/29/2018 50  25 - 100 U/L Final   • Total Bilirubin 08/29/2018 0.2* 0.3 - 1.2 mg/dL Final   • eGFR Non African Amer 08/29/2018 75  >60 mL/min/1.73 Final   • Globulin 08/29/2018 2.7  gm/dL Final   • A/G Ratio 08/29/2018 1.7  1.5 - 2.5 g/dL Final   • BUN/Creatinine Ratio 08/29/2018 20.7  7.0 - 25.0 Final   • Anion Gap 08/29/2018 5.0  3.0 - 11.0 mmol/L Final   • Color, UA 08/29/2018 Yellow  Yellow, Straw Final   • Appearance, UA 08/29/2018 Clear  Clear Final   • pH, UA 08/29/2018 7.0  5.0 - 8.0 Final   • Specific Gravity, UA 08/29/2018 1.011  1.001 - 1.030 Final   • Glucose, UA 08/29/2018 Negative  Negative Final   • Ketones, UA 08/29/2018 Negative  Negative Final   • Bilirubin, UA 08/29/2018 Negative  Negative Final   • Blood, UA 08/29/2018 Trace* Negative Final   • Protein, UA 08/29/2018 Negative  Negative Final   • Leuk Esterase, UA 08/29/2018 Moderate (2+)* Negative Final   • Nitrite, UA 08/29/2018 Negative  Negative Final   • Urobilinogen, UA 08/29/2018 0.2 E.U./dL  0.2 - 1.0 E.U./dL Final   • WBC 08/29/2018 7.66  3.50 - 10.80 10*3/mm3 Final   • RBC 08/29/2018 4.04  3.89 - 5.14 10*6/mm3 Final   • Hemoglobin 08/29/2018 12.6  11.5 - 15.5 g/dL Final   • Hematocrit 08/29/2018 39.1  34.5 - 44.0 % Final   • MCV 08/29/2018 96.8  80.0 - 99.0 fL Final   • MCH 08/29/2018 31.2* 27.0 - 31.0 pg Final   • MCHC 08/29/2018 32.2  32.0 - 36.0 g/dL Final   • RDW 08/29/2018 13.3  11.3 - 14.5 % Final   • RDW-SD 08/29/2018 46.7  37.0 - 54.0 fl Final   • MPV 08/29/2018 11.0  6.0 - 12.0 fL Final   • Platelets 08/29/2018 242  150 - 450 10*3/mm3 Final    • Neutrophil % 08/29/2018 73.0* 41.0 - 71.0 % Final   • Lymphocyte % 08/29/2018 18.1* 24.0 - 44.0 % Final   • Monocyte % 08/29/2018 8.5  0.0 - 12.0 % Final   • Eosinophil % 08/29/2018 0.0  0.0 - 3.0 % Final   • Basophil % 08/29/2018 0.0  0.0 - 1.0 % Final   • Immature Grans % 08/29/2018 0.4  0.0 - 0.6 % Final   • Neutrophils, Absolute 08/29/2018 5.59  1.50 - 8.30 10*3/mm3 Final   • Lymphocytes, Absolute 08/29/2018 1.39  0.60 - 4.80 10*3/mm3 Final   • Monocytes, Absolute 08/29/2018 0.65  0.00 - 1.00 10*3/mm3 Final   • Eosinophils, Absolute 08/29/2018 0.00  0.00 - 0.30 10*3/mm3 Final   • Basophils, Absolute 08/29/2018 0.00  0.00 - 0.20 10*3/mm3 Final   • Immature Grans, Absolute 08/29/2018 0.03  0.00 - 0.03 10*3/mm3 Final   • HCG, Urine QL 08/29/2018 Negative  Negative Final   • RBC, UA 08/29/2018 0-2  None Seen, 0-2 /HPF Final   • WBC, UA 08/29/2018 6-12* None Seen, 0-2 /HPF Final   • Bacteria, UA 08/29/2018 None Seen  None Seen, Trace /HPF Final   • Squamous Epithelial Cells, UA 08/29/2018 0-2  None Seen, 0-2 /HPF Final   • Hyaline Casts, UA 08/29/2018 None Seen  0 - 6 /LPF Final   • Methodology 08/29/2018 Automated Microscopy   Final   • Troponin I 08/29/2018 0.00  0.00 - 0.07 ng/mL Final    Serial Number: [id number]:  [id number]   • Troponin I 08/29/2018 0.00  0.00 - 0.07 ng/mL Final    Serial Number: [id number]  [id number]   • Urine Culture 08/29/2018 60,000-70,000 CFU/mL Normal Urogenital Haylee   Final         Assessment/Plan     Problem List Items Addressed This Visit     None             No Follow-up on file.

## 2019-09-16 NOTE — TELEPHONE ENCOUNTER
Yes, I can put in. I believe that this is typically done at [facility]. Thanks.    Attending Only

## 2020-05-30 ENCOUNTER — HOSPITAL ENCOUNTER (EMERGENCY)
Facility: HOSPITAL | Age: 45
Discharge: HOME | End: 2020-05-31
Attending: EMERGENCY MEDICINE | Admitting: EMERGENCY MEDICINE

## 2020-05-30 DIAGNOSIS — E86.0 DEHYDRATION: ICD-10-CM

## 2020-05-30 DIAGNOSIS — R50.9 FEVER, UNSPECIFIED FEVER CAUSE: Primary | ICD-10-CM

## 2020-05-30 DIAGNOSIS — J02.9 PHARYNGITIS, UNSPECIFIED ETIOLOGY: ICD-10-CM

## 2020-05-30 DIAGNOSIS — R53.83 FATIGUE, UNSPECIFIED TYPE: ICD-10-CM

## 2020-05-30 PROCEDURE — 99283 EMERGENCY DEPT VISIT LOW MDM: CPT

## 2020-05-31 ENCOUNTER — APPOINTMENT (OUTPATIENT)
Dept: CT IMAGING | Facility: HOSPITAL | Age: 45
End: 2020-05-31

## 2020-05-31 VITALS
OXYGEN SATURATION: 98 % | DIASTOLIC BLOOD PRESSURE: 66 MMHG | SYSTOLIC BLOOD PRESSURE: 109 MMHG | TEMPERATURE: 98.1 F | BODY MASS INDEX: 25.71 KG/M2 | HEART RATE: 78 BPM | HEIGHT: 66 IN | WEIGHT: 160 LBS | RESPIRATION RATE: 20 BRPM

## 2020-05-31 LAB
ALBUMIN SERPL-MCNC: 4 G/DL (ref 3.5–5.2)
ALBUMIN/GLOB SERPL: 1.4 G/DL
ALP SERPL-CCNC: 63 U/L (ref 39–117)
ALT SERPL W P-5'-P-CCNC: 19 U/L (ref 1–33)
ANION GAP SERPL CALCULATED.3IONS-SCNC: 15 MMOL/L (ref 5–15)
AST SERPL-CCNC: 18 U/L (ref 1–32)
B PARAPERT DNA SPEC QL NAA+PROBE: NOT DETECTED
B PERT DNA SPEC QL NAA+PROBE: NOT DETECTED
BACTERIA UR QL AUTO: ABNORMAL /HPF
BASOPHILS # BLD AUTO: 0.03 10*3/MM3 (ref 0–0.2)
BASOPHILS NFR BLD AUTO: 0.6 % (ref 0–1.5)
BILIRUB SERPL-MCNC: 0.2 MG/DL (ref 0.2–1.2)
BILIRUB UR QL STRIP: NEGATIVE
BUN BLD-MCNC: 16 MG/DL (ref 6–20)
BUN/CREAT SERPL: 14.3 (ref 7–25)
C PNEUM DNA NPH QL NAA+NON-PROBE: NOT DETECTED
CALCIUM SPEC-SCNC: 8.6 MG/DL (ref 8.6–10.5)
CHLORIDE SERPL-SCNC: 104 MMOL/L (ref 98–107)
CLARITY UR: ABNORMAL
CO2 SERPL-SCNC: 21 MMOL/L (ref 22–29)
COLOR UR: YELLOW
CREAT BLD-MCNC: 1.12 MG/DL (ref 0.57–1)
D DIMER PPP FEU-MCNC: 0.69 MCGFEU/ML (ref 0–0.56)
D-LACTATE SERPL-SCNC: 0.7 MMOL/L (ref 0.5–2)
DEPRECATED RDW RBC AUTO: 49.2 FL (ref 37–54)
EOSINOPHIL # BLD AUTO: 0.05 10*3/MM3 (ref 0–0.4)
EOSINOPHIL NFR BLD AUTO: 0.9 % (ref 0.3–6.2)
ERYTHROCYTE [DISTWIDTH] IN BLOOD BY AUTOMATED COUNT: 13.8 % (ref 12.3–15.4)
FLUAV H1 2009 PAND RNA NPH QL NAA+PROBE: NOT DETECTED
FLUAV H1 HA GENE NPH QL NAA+PROBE: NOT DETECTED
FLUAV H3 RNA NPH QL NAA+PROBE: NOT DETECTED
FLUAV SUBTYP SPEC NAA+PROBE: NOT DETECTED
FLUBV RNA ISLT QL NAA+PROBE: NOT DETECTED
GFR SERPL CREATININE-BSD FRML MDRD: 52 ML/MIN/1.73
GLOBULIN UR ELPH-MCNC: 2.8 GM/DL
GLUCOSE BLD-MCNC: 85 MG/DL (ref 65–99)
GLUCOSE UR STRIP-MCNC: NEGATIVE MG/DL
HADV DNA SPEC NAA+PROBE: NOT DETECTED
HCOV 229E RNA SPEC QL NAA+PROBE: NOT DETECTED
HCOV HKU1 RNA SPEC QL NAA+PROBE: NOT DETECTED
HCOV NL63 RNA SPEC QL NAA+PROBE: NOT DETECTED
HCOV OC43 RNA SPEC QL NAA+PROBE: NOT DETECTED
HCT VFR BLD AUTO: 35.6 % (ref 34–46.6)
HGB BLD-MCNC: 11.2 G/DL (ref 12–15.9)
HGB UR QL STRIP.AUTO: NEGATIVE
HMPV RNA NPH QL NAA+NON-PROBE: NOT DETECTED
HPIV1 RNA SPEC QL NAA+PROBE: NOT DETECTED
HPIV2 RNA SPEC QL NAA+PROBE: NOT DETECTED
HPIV3 RNA NPH QL NAA+PROBE: NOT DETECTED
HPIV4 P GENE NPH QL NAA+PROBE: NOT DETECTED
HYALINE CASTS UR QL AUTO: ABNORMAL /LPF
IMM GRANULOCYTES # BLD AUTO: 0.04 10*3/MM3 (ref 0–0.05)
IMM GRANULOCYTES NFR BLD AUTO: 0.7 % (ref 0–0.5)
KETONES UR QL STRIP: NEGATIVE
LEUKOCYTE ESTERASE UR QL STRIP.AUTO: ABNORMAL
LYMPHOCYTES # BLD AUTO: 0.97 10*3/MM3 (ref 0.7–3.1)
LYMPHOCYTES NFR BLD AUTO: 18.1 % (ref 19.6–45.3)
M PNEUMO IGG SER IA-ACNC: NOT DETECTED
MCH RBC QN AUTO: 30.5 PG (ref 26.6–33)
MCHC RBC AUTO-ENTMCNC: 31.5 G/DL (ref 31.5–35.7)
MCV RBC AUTO: 97 FL (ref 79–97)
MONOCYTES # BLD AUTO: 0.57 10*3/MM3 (ref 0.1–0.9)
MONOCYTES NFR BLD AUTO: 10.6 % (ref 5–12)
MUCOUS THREADS URNS QL MICRO: ABNORMAL /HPF
NEUTROPHILS # BLD AUTO: 3.7 10*3/MM3 (ref 1.7–7)
NEUTROPHILS NFR BLD AUTO: 69.1 % (ref 42.7–76)
NITRITE UR QL STRIP: NEGATIVE
NRBC BLD AUTO-RTO: 0 /100 WBC (ref 0–0.2)
PH UR STRIP.AUTO: <=5 [PH] (ref 5–8)
PLATELET # BLD AUTO: 275 10*3/MM3 (ref 140–450)
PMV BLD AUTO: 9.8 FL (ref 6–12)
POTASSIUM BLD-SCNC: 3.5 MMOL/L (ref 3.5–5.2)
PROT SERPL-MCNC: 6.8 G/DL (ref 6–8.5)
PROT UR QL STRIP: ABNORMAL
RBC # BLD AUTO: 3.67 10*6/MM3 (ref 3.77–5.28)
RBC # UR: ABNORMAL /HPF
REF LAB TEST METHOD: ABNORMAL
RHINOVIRUS RNA SPEC NAA+PROBE: NOT DETECTED
RSV RNA NPH QL NAA+NON-PROBE: NOT DETECTED
S PYO AG THROAT QL: NEGATIVE
SARS-COV-2 RNA RESP QL NAA+PROBE: NOT DETECTED
SODIUM BLD-SCNC: 140 MMOL/L (ref 136–145)
SP GR UR STRIP: 1.01 (ref 1–1.03)
SQUAMOUS #/AREA URNS HPF: ABNORMAL /HPF
UROBILINOGEN UR QL STRIP: ABNORMAL
WBC NRBC COR # BLD: 5.36 10*3/MM3 (ref 3.4–10.8)
WBC UR QL AUTO: ABNORMAL /HPF

## 2020-05-31 PROCEDURE — 0100U HC BIOFIRE FILMARRAY RESP PANEL 2: CPT | Performed by: NURSE PRACTITIONER

## 2020-05-31 PROCEDURE — 80053 COMPREHEN METABOLIC PANEL: CPT | Performed by: NURSE PRACTITIONER

## 2020-05-31 PROCEDURE — 0 IOPAMIDOL PER 1 ML: Performed by: EMERGENCY MEDICINE

## 2020-05-31 PROCEDURE — 87880 STREP A ASSAY W/OPTIC: CPT | Performed by: NURSE PRACTITIONER

## 2020-05-31 PROCEDURE — 87635 SARS-COV-2 COVID-19 AMP PRB: CPT | Performed by: NURSE PRACTITIONER

## 2020-05-31 PROCEDURE — 71275 CT ANGIOGRAPHY CHEST: CPT

## 2020-05-31 PROCEDURE — 83605 ASSAY OF LACTIC ACID: CPT | Performed by: NURSE PRACTITIONER

## 2020-05-31 PROCEDURE — 81001 URINALYSIS AUTO W/SCOPE: CPT | Performed by: NURSE PRACTITIONER

## 2020-05-31 PROCEDURE — 87081 CULTURE SCREEN ONLY: CPT | Performed by: NURSE PRACTITIONER

## 2020-05-31 PROCEDURE — 87147 CULTURE TYPE IMMUNOLOGIC: CPT | Performed by: NURSE PRACTITIONER

## 2020-05-31 PROCEDURE — 85025 COMPLETE CBC W/AUTO DIFF WBC: CPT | Performed by: NURSE PRACTITIONER

## 2020-05-31 PROCEDURE — 85379 FIBRIN DEGRADATION QUANT: CPT | Performed by: NURSE PRACTITIONER

## 2020-05-31 RX ORDER — SODIUM CHLORIDE 0.9 % (FLUSH) 0.9 %
10 SYRINGE (ML) INJECTION AS NEEDED
Status: DISCONTINUED | OUTPATIENT
Start: 2020-05-31 | End: 2020-05-31 | Stop reason: HOSPADM

## 2020-05-31 RX ORDER — DEXLANSOPRAZOLE 60 MG/1
60 CAPSULE, DELAYED RELEASE ORAL DAILY
COMMUNITY

## 2020-05-31 RX ADMIN — SODIUM CHLORIDE 1000 ML: 9 INJECTION, SOLUTION INTRAVENOUS at 00:57

## 2020-05-31 RX ADMIN — IOPAMIDOL 85 ML: 755 INJECTION, SOLUTION INTRAVENOUS at 03:22

## 2020-06-01 ENCOUNTER — TELEPHONE (OUTPATIENT)
Dept: EMERGENCY DEPT | Facility: HOSPITAL | Age: 45
End: 2020-06-01

## 2020-06-01 ENCOUNTER — PATIENT OUTREACH (OUTPATIENT)
Dept: CASE MANAGEMENT | Facility: OTHER | Age: 45
End: 2020-06-01

## 2020-06-01 ENCOUNTER — EPISODE CHANGES (OUTPATIENT)
Dept: CASE MANAGEMENT | Facility: OTHER | Age: 45
End: 2020-06-01

## 2020-06-01 NOTE — OUTREACH NOTE
Patient Outreach Note    ED Potential Covid Discharge Follow-up    Called patient in follow up to ED visit 5-30-20 with fever/ Dehydration, Fatigue, Pharyngitis.  Patient was tested for Covid-19 virus; states she was notified of Negative results.  Patient reports:  No fever today, first day in a couple of weeks; still lethargic; achy on left side; chest pain continues; uncomfortable to take a deep breath.  Patient said she is not doing well.  She said she has a call out to her PCP office to get her recommendations; said she has a f/u appt with PCP on Monday, 6-8-20.   Discussed importance of close PCP f/u, telehealth and video appts. Advised calling PCP office back before end of office hours today, if she has not heard back from PCP office.  Discussed need to return to the ED if symptoms continue.  Conversation brief per patient.    Nina David RN  Ambulatory     6/1/2020, 16:06        Nina David RN  Ambulatory     6/1/2020, 16:05

## 2020-06-02 ENCOUNTER — PATIENT OUTREACH (OUTPATIENT)
Dept: CASE MANAGEMENT | Facility: OTHER | Age: 45
End: 2020-06-02

## 2020-06-02 LAB — BACTERIA SPEC AEROBE CULT: ABNORMAL

## 2020-06-02 NOTE — OUTREACH NOTE
ED Potential Covid Discharge Follow-up    Second call to patient in follow up to ED visit 5-30-20.  Patient was tested for Covid-19 virus; stated she was notified of Negative results.  Patient reports: No fever; still with pain in chest but states it is always there, not uncommon; still with left side of body achiness.  Reports she did talk with her PCP yesterday about her ED visit, her symptoms, her elevated D-Dimer, she said.  Patient said her PCP has scheduled some tests for her and will f/u after with her after that.  Patient said she felt reassured after talking with her doctor.    Patient lives .  Has assistance from him if needed.  Reports no barriers to getting prescriptions filled and taking medications as ordered.  Reports no food or transportation insecurities.    Reviewed with patient: Quarantine precautions (voiced understanding and compliance); ED discharge recommendations, AVS from ED; 24/7 Nurse Triage Line, [phone]; Covid-19 Hotline#, [phone].  Reminded of her appt with PCP on Monday, 6-8-20.  Patient voiced agreement.  She stated she is feeling better today than yesterday.  Voiced appreciation for the RN-ACM call.      Nina David RN  Ambulatory     6/2/2020, 10:34

## 2020-06-03 ENCOUNTER — EPISODE CHANGES (OUTPATIENT)
Dept: CASE MANAGEMENT | Facility: OTHER | Age: 45
End: 2020-06-03

## 2020-06-03 ENCOUNTER — TELEPHONE (OUTPATIENT)
Dept: EMERGENCY DEPT | Facility: HOSPITAL | Age: 45
End: 2020-06-03

## 2020-07-08 ENCOUNTER — HOSPITAL ENCOUNTER (OUTPATIENT)
Facility: HOSPITAL | Age: 45
Setting detail: OBSERVATION
Discharge: HOME | End: 2020-07-14
Attending: INTERNAL MEDICINE | Admitting: INTERNAL MEDICINE

## 2020-07-08 DIAGNOSIS — K62.89 RECTAL PAIN: Primary | ICD-10-CM

## 2020-07-08 PROBLEM — Z98.890 HX OF HEMORRHOIDECTOMY: Status: ACTIVE | Noted: 2020-07-08

## 2020-07-08 PROBLEM — D64.9 ANEMIA: Status: ACTIVE | Noted: 2020-07-08

## 2020-07-08 LAB
ANION GAP SERPL CALCULATED.3IONS-SCNC: 9 MMOL/L (ref 5–15)
BUN SERPL-MCNC: 18 MG/DL (ref 6–20)
BUN/CREAT SERPL: 20.9 (ref 7–25)
CALCIUM SPEC-SCNC: 9 MG/DL (ref 8.6–10.5)
CHLORIDE SERPL-SCNC: 100 MMOL/L (ref 98–107)
CO2 SERPL-SCNC: 27 MMOL/L (ref 22–29)
CREAT SERPL-MCNC: 0.86 MG/DL (ref 0.57–1)
DEPRECATED RDW RBC AUTO: 50.7 FL (ref 37–54)
ERYTHROCYTE [DISTWIDTH] IN BLOOD BY AUTOMATED COUNT: 14 % (ref 12.3–15.4)
GFR SERPL CREATININE-BSD FRML MDRD: 71 ML/MIN/1.73
GLUCOSE SERPL-MCNC: 86 MG/DL (ref 65–99)
HCT VFR BLD AUTO: 32.4 % (ref 34–46.6)
HGB BLD-MCNC: 10.1 G/DL (ref 12–15.9)
MCH RBC QN AUTO: 30.6 PG (ref 26.6–33)
MCHC RBC AUTO-ENTMCNC: 31.2 G/DL (ref 31.5–35.7)
MCV RBC AUTO: 98.2 FL (ref 79–97)
PLATELET # BLD AUTO: 363 10*3/MM3 (ref 140–450)
PMV BLD AUTO: 9.8 FL (ref 6–12)
POTASSIUM SERPL-SCNC: 3.7 MMOL/L (ref 3.5–5.2)
RBC # BLD AUTO: 3.3 10*6/MM3 (ref 3.77–5.28)
SODIUM SERPL-SCNC: 136 MMOL/L (ref 136–145)
WBC # BLD AUTO: 8.52 10*3/MM3 (ref 3.4–10.8)

## 2020-07-08 PROCEDURE — C9803 HOPD COVID-19 SPEC COLLECT: HCPCS | Performed by: NURSE PRACTITIONER

## 2020-07-08 PROCEDURE — 80048 BASIC METABOLIC PNL TOTAL CA: CPT | Performed by: NURSE PRACTITIONER

## 2020-07-08 PROCEDURE — U0004 COV-19 TEST NON-CDC HGH THRU: HCPCS | Performed by: NURSE PRACTITIONER

## 2020-07-08 PROCEDURE — 96374 THER/PROPH/DIAG INJ IV PUSH: CPT

## 2020-07-08 PROCEDURE — 85027 COMPLETE CBC AUTOMATED: CPT | Performed by: NURSE PRACTITIONER

## 2020-07-08 PROCEDURE — 96361 HYDRATE IV INFUSION ADD-ON: CPT

## 2020-07-08 PROCEDURE — U0002 COVID-19 LAB TEST NON-CDC: HCPCS | Performed by: NURSE PRACTITIONER

## 2020-07-08 PROCEDURE — 25010000002 KETOROLAC TROMETHAMINE PER 15 MG: Performed by: COLON & RECTAL SURGERY

## 2020-07-08 PROCEDURE — 96376 TX/PRO/DX INJ SAME DRUG ADON: CPT

## 2020-07-08 RX ORDER — SODIUM CHLORIDE 0.9 % (FLUSH) 0.9 %
10 SYRINGE (ML) INJECTION AS NEEDED
Status: DISCONTINUED | OUTPATIENT
Start: 2020-07-08 | End: 2020-07-14 | Stop reason: HOSPADM

## 2020-07-08 RX ORDER — ONDANSETRON 2 MG/ML
4 INJECTION INTRAMUSCULAR; INTRAVENOUS EVERY 6 HOURS PRN
Status: DISCONTINUED | OUTPATIENT
Start: 2020-07-08 | End: 2020-07-14 | Stop reason: HOSPADM

## 2020-07-08 RX ORDER — ACETAMINOPHEN 500 MG
1000 TABLET ORAL EVERY 8 HOURS SCHEDULED
Status: DISCONTINUED | OUTPATIENT
Start: 2020-07-08 | End: 2020-07-09

## 2020-07-08 RX ORDER — SODIUM CHLORIDE 0.9 % (FLUSH) 0.9 %
10 SYRINGE (ML) INJECTION EVERY 12 HOURS SCHEDULED
Status: DISCONTINUED | OUTPATIENT
Start: 2020-07-08 | End: 2020-07-14 | Stop reason: HOSPADM

## 2020-07-08 RX ORDER — TAMSULOSIN HYDROCHLORIDE 0.4 MG/1
0.4 CAPSULE ORAL NIGHTLY
Status: DISCONTINUED | OUTPATIENT
Start: 2020-07-08 | End: 2020-07-14 | Stop reason: HOSPADM

## 2020-07-08 RX ORDER — PANTOPRAZOLE SODIUM 40 MG/1
40 TABLET, DELAYED RELEASE ORAL
Status: DISCONTINUED | OUTPATIENT
Start: 2020-07-08 | End: 2020-07-14 | Stop reason: HOSPADM

## 2020-07-08 RX ORDER — AMITRIPTYLINE HYDROCHLORIDE 10 MG/1
10 TABLET, FILM COATED ORAL NIGHTLY
Status: DISCONTINUED | OUTPATIENT
Start: 2020-07-08 | End: 2020-07-14 | Stop reason: HOSPADM

## 2020-07-08 RX ORDER — HYDROMORPHONE HYDROCHLORIDE 2 MG/1
2 TABLET ORAL
Status: DISCONTINUED | OUTPATIENT
Start: 2020-07-08 | End: 2020-07-14 | Stop reason: HOSPADM

## 2020-07-08 RX ORDER — SODIUM CHLORIDE 9 MG/ML
100 INJECTION, SOLUTION INTRAVENOUS CONTINUOUS
Status: DISCONTINUED | OUTPATIENT
Start: 2020-07-08 | End: 2020-07-14 | Stop reason: HOSPADM

## 2020-07-08 RX ORDER — LIDOCAINE 50 MG/G
OINTMENT TOPICAL 4 TIMES DAILY PRN
Status: DISCONTINUED | OUTPATIENT
Start: 2020-07-08 | End: 2020-07-09

## 2020-07-08 RX ORDER — TOPIRAMATE 100 MG/1
200 TABLET, FILM COATED ORAL DAILY
Status: DISCONTINUED | OUTPATIENT
Start: 2020-07-08 | End: 2020-07-14 | Stop reason: HOSPADM

## 2020-07-08 RX ORDER — ACETAMINOPHEN 325 MG/1
650 TABLET ORAL EVERY 4 HOURS PRN
Status: DISCONTINUED | OUTPATIENT
Start: 2020-07-08 | End: 2020-07-08

## 2020-07-08 RX ORDER — CHOLECALCIFEROL (VITAMIN D3) 125 MCG
10 CAPSULE ORAL NIGHTLY PRN
Status: DISCONTINUED | OUTPATIENT
Start: 2020-07-08 | End: 2020-07-14 | Stop reason: HOSPADM

## 2020-07-08 RX ORDER — DIAZEPAM 5 MG/1
5 TABLET ORAL EVERY 6 HOURS
Status: DISCONTINUED | OUTPATIENT
Start: 2020-07-08 | End: 2020-07-09

## 2020-07-08 RX ORDER — LABETALOL HYDROCHLORIDE 5 MG/ML
10 INJECTION, SOLUTION INTRAVENOUS EVERY 4 HOURS PRN
Status: DISCONTINUED | OUTPATIENT
Start: 2020-07-08 | End: 2020-07-14 | Stop reason: HOSPADM

## 2020-07-08 RX ORDER — OXYCODONE HYDROCHLORIDE AND ACETAMINOPHEN 5; 325 MG/1; MG/1
1 TABLET ORAL EVERY 4 HOURS PRN
Status: DISCONTINUED | OUTPATIENT
Start: 2020-07-08 | End: 2020-07-09

## 2020-07-08 RX ORDER — KETOROLAC TROMETHAMINE 15 MG/ML
15 INJECTION, SOLUTION INTRAMUSCULAR; INTRAVENOUS EVERY 6 HOURS SCHEDULED
Status: COMPLETED | OUTPATIENT
Start: 2020-07-08 | End: 2020-07-13

## 2020-07-08 RX ADMIN — TOPIRAMATE 200 MG: 100 TABLET, FILM COATED ORAL at 12:35

## 2020-07-08 RX ADMIN — SODIUM CHLORIDE 1000 ML: 9 INJECTION, SOLUTION INTRAVENOUS at 21:43

## 2020-07-08 RX ADMIN — SODIUM CHLORIDE, PRESERVATIVE FREE 10 ML: 5 INJECTION INTRAVENOUS at 20:29

## 2020-07-08 RX ADMIN — KETOROLAC TROMETHAMINE 15 MG: 15 INJECTION, SOLUTION INTRAMUSCULAR; INTRAVENOUS at 20:29

## 2020-07-08 RX ADMIN — MELATONIN TAB 5 MG 10 MG: 5 TAB at 20:37

## 2020-07-08 RX ADMIN — ACETAMINOPHEN 1000 MG: 500 TABLET, FILM COATED ORAL at 15:48

## 2020-07-08 RX ADMIN — KETOROLAC TROMETHAMINE 15 MG: 15 INJECTION, SOLUTION INTRAMUSCULAR; INTRAVENOUS at 15:49

## 2020-07-08 RX ADMIN — HYDROMORPHONE HYDROCHLORIDE 2 MG: 2 TABLET ORAL at 15:52

## 2020-07-08 RX ADMIN — PANTOPRAZOLE SODIUM 40 MG: 40 TABLET, DELAYED RELEASE ORAL at 17:57

## 2020-07-08 RX ADMIN — ACETAMINOPHEN 1000 MG: 500 TABLET, FILM COATED ORAL at 21:43

## 2020-07-08 RX ADMIN — DIAZEPAM 5 MG: 5 TABLET ORAL at 17:57

## 2020-07-08 RX ADMIN — TAMSULOSIN HYDROCHLORIDE 0.4 MG: 0.4 CAPSULE ORAL at 20:29

## 2020-07-08 RX ADMIN — SODIUM CHLORIDE, PRESERVATIVE FREE 10 ML: 5 INJECTION INTRAVENOUS at 15:50

## 2020-07-08 RX ADMIN — SODIUM CHLORIDE 100 ML/HR: 9 INJECTION, SOLUTION INTRAVENOUS at 15:52

## 2020-07-08 RX ADMIN — DIAZEPAM 5 MG: 5 TABLET ORAL at 12:35

## 2020-07-08 RX ADMIN — HYDROMORPHONE HYDROCHLORIDE 2 MG: 2 TABLET ORAL at 12:35

## 2020-07-08 RX ADMIN — AMITRIPTYLINE HYDROCHLORIDE 10 MG: 10 TABLET, FILM COATED ORAL at 20:37

## 2020-07-09 PROBLEM — G89.18 POSTOPERATIVE PAIN: Status: ACTIVE | Noted: 2020-07-09

## 2020-07-09 LAB
ANION GAP SERPL CALCULATED.3IONS-SCNC: 8 MMOL/L (ref 5–15)
BACTERIA UR QL AUTO: ABNORMAL /HPF
BILIRUB UR QL STRIP: NEGATIVE
BUN SERPL-MCNC: 13 MG/DL (ref 6–20)
BUN/CREAT SERPL: 17.8 (ref 7–25)
CALCIUM SPEC-SCNC: 8.5 MG/DL (ref 8.6–10.5)
CHLORIDE SERPL-SCNC: 107 MMOL/L (ref 98–107)
CLARITY UR: ABNORMAL
CO2 SERPL-SCNC: 23 MMOL/L (ref 22–29)
COLOR UR: YELLOW
CREAT SERPL-MCNC: 0.73 MG/DL (ref 0.57–1)
DEPRECATED RDW RBC AUTO: 50.7 FL (ref 37–54)
ERYTHROCYTE [DISTWIDTH] IN BLOOD BY AUTOMATED COUNT: 14 % (ref 12.3–15.4)
GFR SERPL CREATININE-BSD FRML MDRD: 85 ML/MIN/1.73
GLUCOSE SERPL-MCNC: 158 MG/DL (ref 65–99)
GLUCOSE UR STRIP-MCNC: NEGATIVE MG/DL
HCT VFR BLD AUTO: 29.6 % (ref 34–46.6)
HGB BLD-MCNC: 9.2 G/DL (ref 12–15.9)
HGB UR QL STRIP.AUTO: NEGATIVE
HYALINE CASTS UR QL AUTO: ABNORMAL /LPF
KETONES UR QL STRIP: NEGATIVE
LEUKOCYTE ESTERASE UR QL STRIP.AUTO: NEGATIVE
MCH RBC QN AUTO: 30.7 PG (ref 26.6–33)
MCHC RBC AUTO-ENTMCNC: 31.1 G/DL (ref 31.5–35.7)
MCV RBC AUTO: 98.7 FL (ref 79–97)
NITRITE UR QL STRIP: NEGATIVE
PH UR STRIP.AUTO: 8 [PH] (ref 5–8)
PLATELET # BLD AUTO: 308 10*3/MM3 (ref 140–450)
PMV BLD AUTO: 9.5 FL (ref 6–12)
POTASSIUM SERPL-SCNC: 3.6 MMOL/L (ref 3.5–5.2)
PROT UR QL STRIP: NEGATIVE
RBC # BLD AUTO: 3 10*6/MM3 (ref 3.77–5.28)
RBC # UR: ABNORMAL /HPF
REF LAB TEST METHOD: ABNORMAL
REF LAB TEST METHOD: NORMAL
SARS-COV-2 RNA RESP QL NAA+PROBE: NOT DETECTED
SODIUM SERPL-SCNC: 138 MMOL/L (ref 136–145)
SP GR UR STRIP: 1.02 (ref 1–1.03)
SQUAMOUS #/AREA URNS HPF: ABNORMAL /HPF
UROBILINOGEN UR QL STRIP: ABNORMAL
WBC # BLD AUTO: 5.67 10*3/MM3 (ref 3.4–10.8)
WBC UR QL AUTO: ABNORMAL /HPF

## 2020-07-09 PROCEDURE — 96376 TX/PRO/DX INJ SAME DRUG ADON: CPT

## 2020-07-09 PROCEDURE — 80048 BASIC METABOLIC PNL TOTAL CA: CPT | Performed by: NURSE PRACTITIONER

## 2020-07-09 PROCEDURE — G0378 HOSPITAL OBSERVATION PER HR: HCPCS

## 2020-07-09 PROCEDURE — 81001 URINALYSIS AUTO W/SCOPE: CPT | Performed by: COLON & RECTAL SURGERY

## 2020-07-09 PROCEDURE — 25010000002 KETOROLAC TROMETHAMINE PER 15 MG: Performed by: COLON & RECTAL SURGERY

## 2020-07-09 PROCEDURE — 85027 COMPLETE CBC AUTOMATED: CPT | Performed by: NURSE PRACTITIONER

## 2020-07-09 PROCEDURE — 96361 HYDRATE IV INFUSION ADD-ON: CPT

## 2020-07-09 RX ORDER — POLYETHYLENE GLYCOL 3350 17 G/17G
17 POWDER, FOR SOLUTION ORAL 2 TIMES DAILY
Status: DISCONTINUED | OUTPATIENT
Start: 2020-07-09 | End: 2020-07-14 | Stop reason: HOSPADM

## 2020-07-09 RX ORDER — OXYCODONE HYDROCHLORIDE 5 MG/1
5 TABLET ORAL EVERY 4 HOURS PRN
Status: DISCONTINUED | OUTPATIENT
Start: 2020-07-09 | End: 2020-07-14 | Stop reason: HOSPADM

## 2020-07-09 RX ORDER — DIAZEPAM 5 MG/1
5 TABLET ORAL EVERY 6 HOURS PRN
Status: DISCONTINUED | OUTPATIENT
Start: 2020-07-09 | End: 2020-07-14 | Stop reason: HOSPADM

## 2020-07-09 RX ORDER — DOCUSATE SODIUM 100 MG/1
100 CAPSULE, LIQUID FILLED ORAL 2 TIMES DAILY
Status: DISCONTINUED | OUTPATIENT
Start: 2020-07-09 | End: 2020-07-14 | Stop reason: HOSPADM

## 2020-07-09 RX ORDER — ACETAMINOPHEN 325 MG/1
650 TABLET ORAL EVERY 6 HOURS
Status: DISCONTINUED | OUTPATIENT
Start: 2020-07-09 | End: 2020-07-14 | Stop reason: HOSPADM

## 2020-07-09 RX ORDER — OXYCODONE HYDROCHLORIDE 5 MG/1
10 TABLET ORAL EVERY 4 HOURS PRN
Status: DISCONTINUED | OUTPATIENT
Start: 2020-07-09 | End: 2020-07-14 | Stop reason: HOSPADM

## 2020-07-09 RX ORDER — LIDOCAINE 50 MG/G
OINTMENT TOPICAL EVERY 6 HOURS SCHEDULED
Status: DISCONTINUED | OUTPATIENT
Start: 2020-07-09 | End: 2020-07-14 | Stop reason: HOSPADM

## 2020-07-09 RX ADMIN — LIDOCAINE: 50 OINTMENT TOPICAL at 11:03

## 2020-07-09 RX ADMIN — KETOROLAC TROMETHAMINE 15 MG: 15 INJECTION, SOLUTION INTRAMUSCULAR; INTRAVENOUS at 01:51

## 2020-07-09 RX ADMIN — PANTOPRAZOLE SODIUM 40 MG: 40 TABLET, DELAYED RELEASE ORAL at 17:46

## 2020-07-09 RX ADMIN — DIAZEPAM 5 MG: 5 TABLET ORAL at 20:40

## 2020-07-09 RX ADMIN — HYDROMORPHONE HYDROCHLORIDE 2 MG: 2 TABLET ORAL at 00:22

## 2020-07-09 RX ADMIN — KETOROLAC TROMETHAMINE 15 MG: 15 INJECTION, SOLUTION INTRAMUSCULAR; INTRAVENOUS at 06:09

## 2020-07-09 RX ADMIN — PANTOPRAZOLE SODIUM 40 MG: 40 TABLET, DELAYED RELEASE ORAL at 06:09

## 2020-07-09 RX ADMIN — DIAZEPAM 5 MG: 5 TABLET ORAL at 06:09

## 2020-07-09 RX ADMIN — ACETAMINOPHEN 650 MG: 325 TABLET, FILM COATED ORAL at 11:03

## 2020-07-09 RX ADMIN — AMITRIPTYLINE HYDROCHLORIDE 10 MG: 10 TABLET, FILM COATED ORAL at 20:44

## 2020-07-09 RX ADMIN — POLYETHYLENE GLYCOL 3350 17 G: 17 POWDER, FOR SOLUTION ORAL at 08:44

## 2020-07-09 RX ADMIN — TAMSULOSIN HYDROCHLORIDE 0.4 MG: 0.4 CAPSULE ORAL at 20:40

## 2020-07-09 RX ADMIN — ACETAMINOPHEN 650 MG: 325 TABLET, FILM COATED ORAL at 23:07

## 2020-07-09 RX ADMIN — ACETAMINOPHEN 650 MG: 325 TABLET, FILM COATED ORAL at 17:46

## 2020-07-09 RX ADMIN — HYDROMORPHONE HYDROCHLORIDE 2 MG: 2 TABLET ORAL at 15:16

## 2020-07-09 RX ADMIN — SODIUM CHLORIDE 100 ML/HR: 9 INJECTION, SOLUTION INTRAVENOUS at 11:02

## 2020-07-09 RX ADMIN — SODIUM CHLORIDE, PRESERVATIVE FREE 10 ML: 5 INJECTION INTRAVENOUS at 08:44

## 2020-07-09 RX ADMIN — OXYCODONE HYDROCHLORIDE AND ACETAMINOPHEN 1 TABLET: 5; 325 TABLET ORAL at 02:16

## 2020-07-09 RX ADMIN — DOCUSATE SODIUM 100 MG: 100 CAPSULE, LIQUID FILLED ORAL at 20:44

## 2020-07-09 RX ADMIN — LIDOCAINE: 50 OINTMENT TOPICAL at 17:46

## 2020-07-09 RX ADMIN — DOCUSATE SODIUM 100 MG: 100 CAPSULE, LIQUID FILLED ORAL at 15:23

## 2020-07-09 RX ADMIN — SODIUM CHLORIDE 100 ML/HR: 9 INJECTION, SOLUTION INTRAVENOUS at 00:22

## 2020-07-09 RX ADMIN — KETOROLAC TROMETHAMINE 15 MG: 15 INJECTION, SOLUTION INTRAMUSCULAR; INTRAVENOUS at 23:07

## 2020-07-09 RX ADMIN — TOPIRAMATE 200 MG: 100 TABLET, FILM COATED ORAL at 08:44

## 2020-07-09 RX ADMIN — ACETAMINOPHEN 1000 MG: 500 TABLET, FILM COATED ORAL at 06:09

## 2020-07-09 RX ADMIN — SODIUM CHLORIDE, PRESERVATIVE FREE 10 ML: 5 INJECTION INTRAVENOUS at 20:40

## 2020-07-09 RX ADMIN — DIAZEPAM 5 MG: 5 TABLET ORAL at 00:22

## 2020-07-09 RX ADMIN — KETOROLAC TROMETHAMINE 15 MG: 15 INJECTION, SOLUTION INTRAMUSCULAR; INTRAVENOUS at 11:03

## 2020-07-09 RX ADMIN — LIDOCAINE: 50 OINTMENT TOPICAL at 08:44

## 2020-07-09 RX ADMIN — KETOROLAC TROMETHAMINE 15 MG: 15 INJECTION, SOLUTION INTRAMUSCULAR; INTRAVENOUS at 17:46

## 2020-07-09 RX ADMIN — MELATONIN TAB 5 MG 10 MG: 5 TAB at 20:40

## 2020-07-09 RX ADMIN — OXYCODONE 10 MG: 5 TABLET ORAL at 16:19

## 2020-07-10 PROCEDURE — G0378 HOSPITAL OBSERVATION PER HR: HCPCS

## 2020-07-10 PROCEDURE — 96361 HYDRATE IV INFUSION ADD-ON: CPT

## 2020-07-10 PROCEDURE — 25010000002 KETOROLAC TROMETHAMINE PER 15 MG: Performed by: COLON & RECTAL SURGERY

## 2020-07-10 PROCEDURE — 96376 TX/PRO/DX INJ SAME DRUG ADON: CPT

## 2020-07-10 RX ADMIN — DIAZEPAM 5 MG: 5 TABLET ORAL at 02:12

## 2020-07-10 RX ADMIN — KETOROLAC TROMETHAMINE 15 MG: 15 INJECTION, SOLUTION INTRAMUSCULAR; INTRAVENOUS at 17:48

## 2020-07-10 RX ADMIN — KETOROLAC TROMETHAMINE 15 MG: 15 INJECTION, SOLUTION INTRAMUSCULAR; INTRAVENOUS at 04:58

## 2020-07-10 RX ADMIN — PANTOPRAZOLE SODIUM 40 MG: 40 TABLET, DELAYED RELEASE ORAL at 09:06

## 2020-07-10 RX ADMIN — ACETAMINOPHEN 650 MG: 325 TABLET, FILM COATED ORAL at 17:48

## 2020-07-10 RX ADMIN — KETOROLAC TROMETHAMINE 15 MG: 15 INJECTION, SOLUTION INTRAMUSCULAR; INTRAVENOUS at 23:53

## 2020-07-10 RX ADMIN — OXYCODONE 5 MG: 5 TABLET ORAL at 09:12

## 2020-07-10 RX ADMIN — LIDOCAINE: 50 OINTMENT TOPICAL at 17:22

## 2020-07-10 RX ADMIN — DIAZEPAM 5 MG: 5 TABLET ORAL at 17:55

## 2020-07-10 RX ADMIN — POLYETHYLENE GLYCOL 3350 17 G: 17 POWDER, FOR SOLUTION ORAL at 06:28

## 2020-07-10 RX ADMIN — POLYETHYLENE GLYCOL 3350 17 G: 17 POWDER, FOR SOLUTION ORAL at 20:13

## 2020-07-10 RX ADMIN — TAMSULOSIN HYDROCHLORIDE 0.4 MG: 0.4 CAPSULE ORAL at 20:12

## 2020-07-10 RX ADMIN — LIDOCAINE: 50 OINTMENT TOPICAL at 23:53

## 2020-07-10 RX ADMIN — OXYCODONE 10 MG: 5 TABLET ORAL at 14:28

## 2020-07-10 RX ADMIN — ACETAMINOPHEN 650 MG: 325 TABLET, FILM COATED ORAL at 04:58

## 2020-07-10 RX ADMIN — SODIUM CHLORIDE 100 ML/HR: 9 INJECTION, SOLUTION INTRAVENOUS at 23:53

## 2020-07-10 RX ADMIN — ACETAMINOPHEN 650 MG: 325 TABLET, FILM COATED ORAL at 23:52

## 2020-07-10 RX ADMIN — HYDROMORPHONE HYDROCHLORIDE 2 MG: 2 TABLET ORAL at 02:12

## 2020-07-10 RX ADMIN — DOCUSATE SODIUM 100 MG: 100 CAPSULE, LIQUID FILLED ORAL at 09:06

## 2020-07-10 RX ADMIN — ACETAMINOPHEN 650 MG: 325 TABLET, FILM COATED ORAL at 11:55

## 2020-07-10 RX ADMIN — POLYETHYLENE GLYCOL 3350 17 G: 17 POWDER, FOR SOLUTION ORAL at 09:05

## 2020-07-10 RX ADMIN — OXYCODONE 10 MG: 5 TABLET ORAL at 20:13

## 2020-07-10 RX ADMIN — OXYCODONE 10 MG: 5 TABLET ORAL at 04:58

## 2020-07-10 RX ADMIN — DOCUSATE SODIUM 100 MG: 100 CAPSULE, LIQUID FILLED ORAL at 20:12

## 2020-07-10 RX ADMIN — LIDOCAINE: 50 OINTMENT TOPICAL at 11:55

## 2020-07-10 RX ADMIN — PANTOPRAZOLE SODIUM 40 MG: 40 TABLET, DELAYED RELEASE ORAL at 17:48

## 2020-07-10 RX ADMIN — TOPIRAMATE 200 MG: 100 TABLET, FILM COATED ORAL at 09:06

## 2020-07-10 RX ADMIN — SODIUM CHLORIDE, PRESERVATIVE FREE 10 ML: 5 INJECTION INTRAVENOUS at 09:06

## 2020-07-10 RX ADMIN — KETOROLAC TROMETHAMINE 15 MG: 15 INJECTION, SOLUTION INTRAMUSCULAR; INTRAVENOUS at 11:55

## 2020-07-11 PROCEDURE — G0378 HOSPITAL OBSERVATION PER HR: HCPCS

## 2020-07-11 PROCEDURE — 96376 TX/PRO/DX INJ SAME DRUG ADON: CPT

## 2020-07-11 PROCEDURE — 25010000002 KETOROLAC TROMETHAMINE PER 15 MG: Performed by: COLON & RECTAL SURGERY

## 2020-07-11 RX ORDER — DIAZEPAM 5 MG/1
5 TABLET ORAL EVERY 6 HOURS PRN
Qty: 30 TABLET | Refills: 0 | OUTPATIENT
Start: 2020-07-11 | End: 2020-07-23

## 2020-07-11 RX ORDER — HYDROCODONE BITARTRATE AND ACETAMINOPHEN 7.5; 325 MG/1; MG/1
1 TABLET ORAL EVERY 6 HOURS PRN
Qty: 30 TABLET | Refills: 0 | Status: SHIPPED | OUTPATIENT
Start: 2020-07-11 | End: 2020-07-14 | Stop reason: HOSPADM

## 2020-07-11 RX ADMIN — ACETAMINOPHEN 650 MG: 325 TABLET, FILM COATED ORAL at 17:23

## 2020-07-11 RX ADMIN — AMITRIPTYLINE HYDROCHLORIDE 10 MG: 10 TABLET, FILM COATED ORAL at 20:35

## 2020-07-11 RX ADMIN — ACETAMINOPHEN 650 MG: 325 TABLET, FILM COATED ORAL at 05:52

## 2020-07-11 RX ADMIN — PANTOPRAZOLE SODIUM 40 MG: 40 TABLET, DELAYED RELEASE ORAL at 17:23

## 2020-07-11 RX ADMIN — TOPIRAMATE 200 MG: 100 TABLET, FILM COATED ORAL at 08:41

## 2020-07-11 RX ADMIN — KETOROLAC TROMETHAMINE 15 MG: 15 INJECTION, SOLUTION INTRAMUSCULAR; INTRAVENOUS at 11:28

## 2020-07-11 RX ADMIN — DIAZEPAM 5 MG: 5 TABLET ORAL at 20:35

## 2020-07-11 RX ADMIN — OXYCODONE 10 MG: 5 TABLET ORAL at 22:41

## 2020-07-11 RX ADMIN — LIDOCAINE: 50 OINTMENT TOPICAL at 23:28

## 2020-07-11 RX ADMIN — HYDROMORPHONE HYDROCHLORIDE 2 MG: 2 TABLET ORAL at 11:28

## 2020-07-11 RX ADMIN — POLYETHYLENE GLYCOL 3350 17 G: 17 POWDER, FOR SOLUTION ORAL at 05:53

## 2020-07-11 RX ADMIN — PANTOPRAZOLE SODIUM 40 MG: 40 TABLET, DELAYED RELEASE ORAL at 08:41

## 2020-07-11 RX ADMIN — HYDROMORPHONE HYDROCHLORIDE 2 MG: 2 TABLET ORAL at 05:52

## 2020-07-11 RX ADMIN — KETOROLAC TROMETHAMINE 15 MG: 15 INJECTION, SOLUTION INTRAMUSCULAR; INTRAVENOUS at 05:52

## 2020-07-11 RX ADMIN — KETOROLAC TROMETHAMINE 15 MG: 15 INJECTION, SOLUTION INTRAMUSCULAR; INTRAVENOUS at 17:23

## 2020-07-11 RX ADMIN — ACETAMINOPHEN 650 MG: 325 TABLET, FILM COATED ORAL at 23:28

## 2020-07-11 RX ADMIN — DOCUSATE SODIUM 100 MG: 100 CAPSULE, LIQUID FILLED ORAL at 08:41

## 2020-07-11 RX ADMIN — DOCUSATE SODIUM 100 MG: 100 CAPSULE, LIQUID FILLED ORAL at 20:36

## 2020-07-11 RX ADMIN — POLYETHYLENE GLYCOL 3350 17 G: 17 POWDER, FOR SOLUTION ORAL at 20:36

## 2020-07-11 RX ADMIN — OXYCODONE 5 MG: 5 TABLET ORAL at 17:00

## 2020-07-11 RX ADMIN — TAMSULOSIN HYDROCHLORIDE 0.4 MG: 0.4 CAPSULE ORAL at 20:35

## 2020-07-11 RX ADMIN — ACETAMINOPHEN 650 MG: 325 TABLET, FILM COATED ORAL at 11:28

## 2020-07-11 RX ADMIN — OXYCODONE 10 MG: 5 TABLET ORAL at 08:41

## 2020-07-11 RX ADMIN — DIAZEPAM 5 MG: 5 TABLET ORAL at 05:52

## 2020-07-11 RX ADMIN — KETOROLAC TROMETHAMINE 15 MG: 15 INJECTION, SOLUTION INTRAMUSCULAR; INTRAVENOUS at 23:28

## 2020-07-11 RX ADMIN — HYDROMORPHONE HYDROCHLORIDE 2 MG: 2 TABLET ORAL at 20:35

## 2020-07-11 RX ADMIN — POLYETHYLENE GLYCOL 3350 17 G: 17 POWDER, FOR SOLUTION ORAL at 08:42

## 2020-07-11 RX ADMIN — LIDOCAINE: 50 OINTMENT TOPICAL at 11:31

## 2020-07-12 PROCEDURE — 96376 TX/PRO/DX INJ SAME DRUG ADON: CPT

## 2020-07-12 PROCEDURE — G0378 HOSPITAL OBSERVATION PER HR: HCPCS

## 2020-07-12 PROCEDURE — 25010000002 KETOROLAC TROMETHAMINE PER 15 MG: Performed by: COLON & RECTAL SURGERY

## 2020-07-12 RX ORDER — PSEUDOEPHEDRINE HCL 30 MG
100 TABLET ORAL 2 TIMES DAILY
Qty: 60 EACH | Refills: 0 | OUTPATIENT
Start: 2020-07-12 | End: 2020-07-23

## 2020-07-12 RX ADMIN — AMITRIPTYLINE HYDROCHLORIDE 10 MG: 10 TABLET, FILM COATED ORAL at 20:18

## 2020-07-12 RX ADMIN — KETOROLAC TROMETHAMINE 15 MG: 15 INJECTION, SOLUTION INTRAMUSCULAR; INTRAVENOUS at 17:51

## 2020-07-12 RX ADMIN — LIDOCAINE: 50 OINTMENT TOPICAL at 20:22

## 2020-07-12 RX ADMIN — POLYETHYLENE GLYCOL 3350 17 G: 17 POWDER, FOR SOLUTION ORAL at 09:50

## 2020-07-12 RX ADMIN — KETOROLAC TROMETHAMINE 15 MG: 15 INJECTION, SOLUTION INTRAMUSCULAR; INTRAVENOUS at 23:58

## 2020-07-12 RX ADMIN — DOCUSATE SODIUM 100 MG: 100 CAPSULE, LIQUID FILLED ORAL at 09:50

## 2020-07-12 RX ADMIN — KETOROLAC TROMETHAMINE 15 MG: 15 INJECTION, SOLUTION INTRAMUSCULAR; INTRAVENOUS at 05:33

## 2020-07-12 RX ADMIN — PANTOPRAZOLE SODIUM 40 MG: 40 TABLET, DELAYED RELEASE ORAL at 09:50

## 2020-07-12 RX ADMIN — POLYETHYLENE GLYCOL 3350 17 G: 17 POWDER, FOR SOLUTION ORAL at 20:18

## 2020-07-12 RX ADMIN — HYDROMORPHONE HYDROCHLORIDE 2 MG: 2 TABLET ORAL at 13:23

## 2020-07-12 RX ADMIN — DOCUSATE SODIUM 100 MG: 100 CAPSULE, LIQUID FILLED ORAL at 20:19

## 2020-07-12 RX ADMIN — OXYCODONE 10 MG: 5 TABLET ORAL at 09:51

## 2020-07-12 RX ADMIN — DIAZEPAM 5 MG: 5 TABLET ORAL at 20:22

## 2020-07-12 RX ADMIN — KETOROLAC TROMETHAMINE 15 MG: 15 INJECTION, SOLUTION INTRAMUSCULAR; INTRAVENOUS at 12:42

## 2020-07-12 RX ADMIN — ACETAMINOPHEN 650 MG: 325 TABLET, FILM COATED ORAL at 23:58

## 2020-07-12 RX ADMIN — ACETAMINOPHEN 650 MG: 325 TABLET, FILM COATED ORAL at 17:51

## 2020-07-12 RX ADMIN — DIAZEPAM 5 MG: 5 TABLET ORAL at 09:51

## 2020-07-12 RX ADMIN — PANTOPRAZOLE SODIUM 40 MG: 40 TABLET, DELAYED RELEASE ORAL at 17:51

## 2020-07-12 RX ADMIN — POLYETHYLENE GLYCOL 3350 17 G: 17 POWDER, FOR SOLUTION ORAL at 17:59

## 2020-07-12 RX ADMIN — ACETAMINOPHEN 650 MG: 325 TABLET, FILM COATED ORAL at 12:42

## 2020-07-12 RX ADMIN — ACETAMINOPHEN 650 MG: 325 TABLET, FILM COATED ORAL at 05:33

## 2020-07-12 RX ADMIN — TOPIRAMATE 200 MG: 100 TABLET, FILM COATED ORAL at 09:50

## 2020-07-12 RX ADMIN — HYDROMORPHONE HYDROCHLORIDE 2 MG: 2 TABLET ORAL at 23:58

## 2020-07-12 RX ADMIN — TAMSULOSIN HYDROCHLORIDE 0.4 MG: 0.4 CAPSULE ORAL at 20:19

## 2020-07-12 RX ADMIN — HYDROMORPHONE HYDROCHLORIDE 2 MG: 2 TABLET ORAL at 20:29

## 2020-07-12 RX ADMIN — SODIUM CHLORIDE, PRESERVATIVE FREE 10 ML: 5 INJECTION INTRAVENOUS at 20:19

## 2020-07-13 PROCEDURE — 96372 THER/PROPH/DIAG INJ SC/IM: CPT

## 2020-07-13 PROCEDURE — 25010000002 KETOROLAC TROMETHAMINE PER 15 MG: Performed by: INTERNAL MEDICINE

## 2020-07-13 PROCEDURE — 25010000002 METHYLNALTREXONE 12 MG/0.6ML SOLUTION: Performed by: COLON & RECTAL SURGERY

## 2020-07-13 PROCEDURE — 96376 TX/PRO/DX INJ SAME DRUG ADON: CPT

## 2020-07-13 PROCEDURE — 96361 HYDRATE IV INFUSION ADD-ON: CPT

## 2020-07-13 PROCEDURE — G0378 HOSPITAL OBSERVATION PER HR: HCPCS

## 2020-07-13 PROCEDURE — 25010000002 KETOROLAC TROMETHAMINE PER 15 MG: Performed by: COLON & RECTAL SURGERY

## 2020-07-13 RX ORDER — DULOXETIN HYDROCHLORIDE 30 MG/1
30 CAPSULE, DELAYED RELEASE ORAL DAILY
Status: DISCONTINUED | OUTPATIENT
Start: 2020-07-13 | End: 2020-07-14 | Stop reason: HOSPADM

## 2020-07-13 RX ORDER — BISOPROLOL FUMARATE 5 MG/1
5 TABLET, FILM COATED ORAL DAILY PRN
COMMUNITY
End: 2022-01-05

## 2020-07-13 RX ORDER — DULOXETIN HYDROCHLORIDE 60 MG/1
60 CAPSULE, DELAYED RELEASE ORAL DAILY
Status: DISCONTINUED | OUTPATIENT
Start: 2020-07-13 | End: 2020-07-14 | Stop reason: HOSPADM

## 2020-07-13 RX ORDER — DULOXETIN HYDROCHLORIDE 60 MG/1
60 CAPSULE, DELAYED RELEASE ORAL DAILY
COMMUNITY
End: 2020-07-23

## 2020-07-13 RX ORDER — AMOXICILLIN 250 MG
2 CAPSULE ORAL 2 TIMES DAILY
Status: DISCONTINUED | OUTPATIENT
Start: 2020-07-13 | End: 2020-07-14 | Stop reason: HOSPADM

## 2020-07-13 RX ORDER — MIDODRINE HYDROCHLORIDE 5 MG/1
30 TABLET ORAL DAILY
COMMUNITY
End: 2023-02-27

## 2020-07-13 RX ORDER — PILOCARPINE HYDROCHLORIDE 5 MG/1
5 TABLET, FILM COATED ORAL 3 TIMES DAILY
COMMUNITY
End: 2020-07-23

## 2020-07-13 RX ORDER — KETOROLAC TROMETHAMINE 15 MG/ML
15 INJECTION, SOLUTION INTRAMUSCULAR; INTRAVENOUS ONCE
Status: COMPLETED | OUTPATIENT
Start: 2020-07-13 | End: 2020-07-13

## 2020-07-13 RX ORDER — PILOCARPINE HYDROCHLORIDE 5 MG/1
5 TABLET, FILM COATED ORAL 3 TIMES DAILY
Status: DISCONTINUED | OUTPATIENT
Start: 2020-07-13 | End: 2020-07-14 | Stop reason: HOSPADM

## 2020-07-13 RX ORDER — MAGNESIUM CARB/ALUMINUM HYDROX 105-160MG
300 TABLET,CHEWABLE ORAL ONCE
Status: COMPLETED | OUTPATIENT
Start: 2020-07-13 | End: 2020-07-13

## 2020-07-13 RX ORDER — OXYCODONE HYDROCHLORIDE 15 MG/1
15 TABLET ORAL EVERY 4 HOURS PRN
Status: DISCONTINUED | OUTPATIENT
Start: 2020-07-13 | End: 2020-07-14 | Stop reason: HOSPADM

## 2020-07-13 RX ORDER — HYDROXYCHLOROQUINE SULFATE 200 MG/1
300 TABLET, FILM COATED ORAL DAILY
COMMUNITY

## 2020-07-13 RX ORDER — DULOXETIN HYDROCHLORIDE 30 MG/1
90 CAPSULE, DELAYED RELEASE ORAL DAILY
COMMUNITY
End: 2022-01-05

## 2020-07-13 RX ADMIN — PILOCARPINE HYDROCHLORIDE 5 MG: 5 TABLET, FILM COATED ORAL at 17:04

## 2020-07-13 RX ADMIN — Medication 300 ML: at 17:03

## 2020-07-13 RX ADMIN — PANTOPRAZOLE SODIUM 40 MG: 40 TABLET, DELAYED RELEASE ORAL at 17:04

## 2020-07-13 RX ADMIN — ACETAMINOPHEN 650 MG: 325 TABLET, FILM COATED ORAL at 05:19

## 2020-07-13 RX ADMIN — MELATONIN TAB 5 MG 10 MG: 5 TAB at 01:00

## 2020-07-13 RX ADMIN — SODIUM CHLORIDE 100 ML/HR: 9 INJECTION, SOLUTION INTRAVENOUS at 12:01

## 2020-07-13 RX ADMIN — TAMSULOSIN HYDROCHLORIDE 0.4 MG: 0.4 CAPSULE ORAL at 20:17

## 2020-07-13 RX ADMIN — DIAZEPAM 5 MG: 5 TABLET ORAL at 23:15

## 2020-07-13 RX ADMIN — ACETAMINOPHEN 650 MG: 325 TABLET, FILM COATED ORAL at 11:07

## 2020-07-13 RX ADMIN — OXYCODONE HYDROCHLORIDE 15 MG: 15 TABLET ORAL at 18:23

## 2020-07-13 RX ADMIN — TOPIRAMATE 200 MG: 100 TABLET, FILM COATED ORAL at 08:44

## 2020-07-13 RX ADMIN — KETOROLAC TROMETHAMINE 15 MG: 15 INJECTION, SOLUTION INTRAMUSCULAR; INTRAVENOUS at 12:01

## 2020-07-13 RX ADMIN — POLYETHYLENE GLYCOL 3350 17 G: 17 POWDER, FOR SOLUTION ORAL at 08:44

## 2020-07-13 RX ADMIN — DOCUSATE SODIUM 50MG AND SENNOSIDES 8.6MG 2 TABLET: 8.6; 5 TABLET, FILM COATED ORAL at 20:17

## 2020-07-13 RX ADMIN — PANTOPRAZOLE SODIUM 40 MG: 40 TABLET, DELAYED RELEASE ORAL at 08:44

## 2020-07-13 RX ADMIN — OXYCODONE HYDROCHLORIDE 15 MG: 15 TABLET ORAL at 14:38

## 2020-07-13 RX ADMIN — SODIUM CHLORIDE, PRESERVATIVE FREE 10 ML: 5 INJECTION INTRAVENOUS at 20:17

## 2020-07-13 RX ADMIN — HYDROMORPHONE HYDROCHLORIDE 2 MG: 2 TABLET ORAL at 11:07

## 2020-07-13 RX ADMIN — OXYCODONE 10 MG: 5 TABLET ORAL at 00:58

## 2020-07-13 RX ADMIN — DOCUSATE SODIUM 50MG AND SENNOSIDES 8.6MG 2 TABLET: 8.6; 5 TABLET, FILM COATED ORAL at 11:07

## 2020-07-13 RX ADMIN — PILOCARPINE HYDROCHLORIDE 5 MG: 5 TABLET, FILM COATED ORAL at 20:17

## 2020-07-13 RX ADMIN — METHYLNALTREXONE BROMIDE 8 MG: 12 INJECTION, SOLUTION SUBCUTANEOUS at 17:03

## 2020-07-13 RX ADMIN — DIAZEPAM 5 MG: 5 TABLET ORAL at 17:07

## 2020-07-13 RX ADMIN — ACETAMINOPHEN 650 MG: 325 TABLET, FILM COATED ORAL at 17:04

## 2020-07-13 RX ADMIN — LIDOCAINE: 50 OINTMENT TOPICAL at 17:03

## 2020-07-13 RX ADMIN — KETOROLAC TROMETHAMINE 15 MG: 15 INJECTION, SOLUTION INTRAMUSCULAR; INTRAVENOUS at 05:19

## 2020-07-13 RX ADMIN — DOCUSATE SODIUM 100 MG: 100 CAPSULE, LIQUID FILLED ORAL at 20:17

## 2020-07-13 RX ADMIN — ACETAMINOPHEN 650 MG: 325 TABLET, FILM COATED ORAL at 23:15

## 2020-07-13 RX ADMIN — AMITRIPTYLINE HYDROCHLORIDE 10 MG: 10 TABLET, FILM COATED ORAL at 20:17

## 2020-07-13 RX ADMIN — OXYCODONE HYDROCHLORIDE 15 MG: 15 TABLET ORAL at 22:42

## 2020-07-13 RX ADMIN — DIAZEPAM 5 MG: 5 TABLET ORAL at 05:19

## 2020-07-13 RX ADMIN — DIAZEPAM 5 MG: 5 TABLET ORAL at 11:07

## 2020-07-13 RX ADMIN — LIDOCAINE: 50 OINTMENT TOPICAL at 05:22

## 2020-07-13 RX ADMIN — DOCUSATE SODIUM 100 MG: 100 CAPSULE, LIQUID FILLED ORAL at 08:44

## 2020-07-13 RX ADMIN — LIDOCAINE: 50 OINTMENT TOPICAL at 23:15

## 2020-07-13 RX ADMIN — LIDOCAINE: 50 OINTMENT TOPICAL at 11:07

## 2020-07-14 VITALS
DIASTOLIC BLOOD PRESSURE: 61 MMHG | SYSTOLIC BLOOD PRESSURE: 99 MMHG | WEIGHT: 164.5 LBS | TEMPERATURE: 98.5 F | HEIGHT: 66 IN | BODY MASS INDEX: 26.44 KG/M2 | OXYGEN SATURATION: 97 % | RESPIRATION RATE: 18 BRPM | HEART RATE: 118 BPM

## 2020-07-14 PROCEDURE — G0378 HOSPITAL OBSERVATION PER HR: HCPCS

## 2020-07-14 RX ORDER — OXYCODONE HYDROCHLORIDE 15 MG/1
15 TABLET ORAL EVERY 6 HOURS PRN
Qty: 40 TABLET | Refills: 0 | Status: SHIPPED | OUTPATIENT
Start: 2020-07-14 | End: 2020-07-24

## 2020-07-14 RX ORDER — SENNA PLUS 8.6 MG/1
1 TABLET ORAL DAILY
Qty: 30 TABLET | Refills: 0 | OUTPATIENT
Start: 2020-07-14 | End: 2020-07-23

## 2020-07-14 RX ADMIN — ACETAMINOPHEN 650 MG: 325 TABLET, FILM COATED ORAL at 11:55

## 2020-07-14 RX ADMIN — ACETAMINOPHEN 650 MG: 325 TABLET, FILM COATED ORAL at 05:00

## 2020-07-14 RX ADMIN — MELATONIN TAB 5 MG 10 MG: 5 TAB at 01:01

## 2020-07-14 RX ADMIN — OXYCODONE 10 MG: 5 TABLET ORAL at 05:00

## 2020-07-14 RX ADMIN — DULOXETINE HYDROCHLORIDE 30 MG: 60 CAPSULE, DELAYED RELEASE ORAL at 08:58

## 2020-07-14 RX ADMIN — HYDROMORPHONE HYDROCHLORIDE 2 MG: 2 TABLET ORAL at 01:01

## 2020-07-14 RX ADMIN — DOCUSATE SODIUM 50MG AND SENNOSIDES 8.6MG 2 TABLET: 8.6; 5 TABLET, FILM COATED ORAL at 08:59

## 2020-07-14 RX ADMIN — PANTOPRAZOLE SODIUM 40 MG: 40 TABLET, DELAYED RELEASE ORAL at 09:00

## 2020-07-14 RX ADMIN — POLYETHYLENE GLYCOL 3350 17 G: 17 POWDER, FOR SOLUTION ORAL at 08:59

## 2020-07-14 RX ADMIN — OXYCODONE HYDROCHLORIDE 15 MG: 15 TABLET ORAL at 11:55

## 2020-07-14 RX ADMIN — TOPIRAMATE 200 MG: 100 TABLET, FILM COATED ORAL at 08:58

## 2020-07-14 RX ADMIN — PILOCARPINE HYDROCHLORIDE 5 MG: 5 TABLET, FILM COATED ORAL at 08:59

## 2020-07-14 RX ADMIN — DULOXETINE HYDROCHLORIDE 60 MG: 60 CAPSULE, DELAYED RELEASE ORAL at 09:01

## 2020-07-23 ENCOUNTER — APPOINTMENT (OUTPATIENT)
Dept: GENERAL RADIOLOGY | Facility: HOSPITAL | Age: 45
End: 2020-07-23

## 2020-07-23 ENCOUNTER — HOSPITAL ENCOUNTER (EMERGENCY)
Facility: HOSPITAL | Age: 45
Discharge: HOME | End: 2020-07-23
Attending: EMERGENCY MEDICINE | Admitting: EMERGENCY MEDICINE

## 2020-07-23 ENCOUNTER — APPOINTMENT (OUTPATIENT)
Dept: CT IMAGING | Facility: HOSPITAL | Age: 45
End: 2020-07-23

## 2020-07-23 VITALS
RESPIRATION RATE: 18 BRPM | HEIGHT: 66 IN | BODY MASS INDEX: 24.11 KG/M2 | TEMPERATURE: 98.9 F | HEART RATE: 68 BPM | WEIGHT: 150 LBS | OXYGEN SATURATION: 98 % | DIASTOLIC BLOOD PRESSURE: 80 MMHG | SYSTOLIC BLOOD PRESSURE: 130 MMHG

## 2020-07-23 DIAGNOSIS — Z74.09 IMPAIRED FUNCTIONAL MOBILITY, BALANCE, GAIT, AND ENDURANCE: ICD-10-CM

## 2020-07-23 DIAGNOSIS — R53.1 GENERALIZED WEAKNESS: Primary | ICD-10-CM

## 2020-07-23 DIAGNOSIS — R53.82 CHRONIC FATIGUE: ICD-10-CM

## 2020-07-23 LAB
ALBUMIN SERPL-MCNC: 4.3 G/DL (ref 3.5–5.2)
ALBUMIN/GLOB SERPL: 1.2 G/DL
ALP SERPL-CCNC: 65 U/L (ref 39–117)
ALT SERPL W P-5'-P-CCNC: 9 U/L (ref 1–33)
ANION GAP SERPL CALCULATED.3IONS-SCNC: 11 MMOL/L (ref 5–15)
AST SERPL-CCNC: 15 U/L (ref 1–32)
B PARAPERT DNA SPEC QL NAA+PROBE: NOT DETECTED
B PERT DNA SPEC QL NAA+PROBE: NOT DETECTED
BACTERIA UR QL AUTO: ABNORMAL /HPF
BASOPHILS # BLD AUTO: 0.05 10*3/MM3 (ref 0–0.2)
BASOPHILS NFR BLD AUTO: 0.7 % (ref 0–1.5)
BILIRUB SERPL-MCNC: 0.2 MG/DL (ref 0–1.2)
BILIRUB UR QL STRIP: NEGATIVE
BUN SERPL-MCNC: 16 MG/DL (ref 6–20)
BUN/CREAT SERPL: 16.3 (ref 7–25)
C PNEUM DNA NPH QL NAA+NON-PROBE: NOT DETECTED
CALCIUM SPEC-SCNC: 9.4 MG/DL (ref 8.6–10.5)
CHLORIDE SERPL-SCNC: 102 MMOL/L (ref 98–107)
CLARITY UR: ABNORMAL
CO2 SERPL-SCNC: 24 MMOL/L (ref 22–29)
COLOR UR: YELLOW
CREAT SERPL-MCNC: 0.98 MG/DL (ref 0.57–1)
CRP SERPL-MCNC: 0.24 MG/DL (ref 0–0.5)
D-LACTATE SERPL-SCNC: 1 MMOL/L (ref 0.5–2)
DEPRECATED RDW RBC AUTO: 49.9 FL (ref 37–54)
EOSINOPHIL # BLD AUTO: 0.29 10*3/MM3 (ref 0–0.4)
EOSINOPHIL NFR BLD AUTO: 4 % (ref 0.3–6.2)
ERYTHROCYTE [DISTWIDTH] IN BLOOD BY AUTOMATED COUNT: 13.9 % (ref 12.3–15.4)
FLUAV H1 2009 PAND RNA NPH QL NAA+PROBE: NOT DETECTED
FLUAV H1 HA GENE NPH QL NAA+PROBE: NOT DETECTED
FLUAV H3 RNA NPH QL NAA+PROBE: NOT DETECTED
FLUAV SUBTYP SPEC NAA+PROBE: NOT DETECTED
FLUBV RNA ISLT QL NAA+PROBE: NOT DETECTED
GFR SERPL CREATININE-BSD FRML MDRD: 61 ML/MIN/1.73
GLOBULIN UR ELPH-MCNC: 3.6 GM/DL
GLUCOSE SERPL-MCNC: 74 MG/DL (ref 65–99)
GLUCOSE UR STRIP-MCNC: NEGATIVE MG/DL
HADV DNA SPEC NAA+PROBE: NOT DETECTED
HCOV 229E RNA SPEC QL NAA+PROBE: NOT DETECTED
HCOV HKU1 RNA SPEC QL NAA+PROBE: NOT DETECTED
HCOV NL63 RNA SPEC QL NAA+PROBE: NOT DETECTED
HCOV OC43 RNA SPEC QL NAA+PROBE: NOT DETECTED
HCT VFR BLD AUTO: 35.8 % (ref 34–46.6)
HGB BLD-MCNC: 11 G/DL (ref 12–15.9)
HGB UR QL STRIP.AUTO: NEGATIVE
HMPV RNA NPH QL NAA+NON-PROBE: NOT DETECTED
HOLD SPECIMEN: NORMAL
HOLD SPECIMEN: NORMAL
HPIV1 RNA SPEC QL NAA+PROBE: NOT DETECTED
HPIV2 RNA SPEC QL NAA+PROBE: NOT DETECTED
HPIV3 RNA NPH QL NAA+PROBE: NOT DETECTED
HPIV4 P GENE NPH QL NAA+PROBE: NOT DETECTED
HYALINE CASTS UR QL AUTO: ABNORMAL /LPF
IMM GRANULOCYTES # BLD AUTO: 0.02 10*3/MM3 (ref 0–0.05)
IMM GRANULOCYTES NFR BLD AUTO: 0.3 % (ref 0–0.5)
KETONES UR QL STRIP: NEGATIVE
LDH SERPL-CCNC: 162 U/L (ref 135–214)
LEUKOCYTE ESTERASE UR QL STRIP.AUTO: ABNORMAL
LYMPHOCYTES # BLD AUTO: 2.08 10*3/MM3 (ref 0.7–3.1)
LYMPHOCYTES NFR BLD AUTO: 28.7 % (ref 19.6–45.3)
M PNEUMO IGG SER IA-ACNC: NOT DETECTED
MAGNESIUM SERPL-MCNC: 2.1 MG/DL (ref 1.6–2.6)
MCH RBC QN AUTO: 30.1 PG (ref 26.6–33)
MCHC RBC AUTO-ENTMCNC: 30.7 G/DL (ref 31.5–35.7)
MCV RBC AUTO: 97.8 FL (ref 79–97)
MONOCYTES # BLD AUTO: 0.52 10*3/MM3 (ref 0.1–0.9)
MONOCYTES NFR BLD AUTO: 7.2 % (ref 5–12)
NEUTROPHILS NFR BLD AUTO: 4.3 10*3/MM3 (ref 1.7–7)
NEUTROPHILS NFR BLD AUTO: 59.1 % (ref 42.7–76)
NITRITE UR QL STRIP: NEGATIVE
NRBC BLD AUTO-RTO: 0 /100 WBC (ref 0–0.2)
NT-PROBNP SERPL-MCNC: 37.5 PG/ML (ref 0–450)
PH UR STRIP.AUTO: <=5 [PH] (ref 5–8)
PLATELET # BLD AUTO: 477 10*3/MM3 (ref 140–450)
PMV BLD AUTO: 9.4 FL (ref 6–12)
POTASSIUM SERPL-SCNC: 4.2 MMOL/L (ref 3.5–5.2)
PROCALCITONIN SERPL-MCNC: 0.07 NG/ML (ref 0–0.25)
PROT SERPL-MCNC: 7.9 G/DL (ref 6–8.5)
PROT UR QL STRIP: NEGATIVE
RBC # BLD AUTO: 3.66 10*6/MM3 (ref 3.77–5.28)
RBC # UR: ABNORMAL /HPF
REF LAB TEST METHOD: ABNORMAL
RHINOVIRUS RNA SPEC NAA+PROBE: NOT DETECTED
RSV RNA NPH QL NAA+NON-PROBE: NOT DETECTED
SODIUM SERPL-SCNC: 137 MMOL/L (ref 136–145)
SP GR UR STRIP: 1.06 (ref 1–1.03)
SQUAMOUS #/AREA URNS HPF: ABNORMAL /HPF
TROPONIN T SERPL-MCNC: <0.01 NG/ML (ref 0–0.03)
UROBILINOGEN UR QL STRIP: ABNORMAL
WBC # BLD AUTO: 7.26 10*3/MM3 (ref 3.4–10.8)
WBC UR QL AUTO: ABNORMAL /HPF
WHOLE BLOOD HOLD SPECIMEN: NORMAL
WHOLE BLOOD HOLD SPECIMEN: NORMAL
YEAST URNS QL MICRO: ABNORMAL /HPF

## 2020-07-23 PROCEDURE — 83735 ASSAY OF MAGNESIUM: CPT | Performed by: EMERGENCY MEDICINE

## 2020-07-23 PROCEDURE — 81001 URINALYSIS AUTO W/SCOPE: CPT | Performed by: EMERGENCY MEDICINE

## 2020-07-23 PROCEDURE — 93005 ELECTROCARDIOGRAM TRACING: CPT | Performed by: EMERGENCY MEDICINE

## 2020-07-23 PROCEDURE — 84484 ASSAY OF TROPONIN QUANT: CPT | Performed by: EMERGENCY MEDICINE

## 2020-07-23 PROCEDURE — 83605 ASSAY OF LACTIC ACID: CPT | Performed by: NURSE PRACTITIONER

## 2020-07-23 PROCEDURE — 71275 CT ANGIOGRAPHY CHEST: CPT

## 2020-07-23 PROCEDURE — U0002 COVID-19 LAB TEST NON-CDC: HCPCS | Performed by: NURSE PRACTITIONER

## 2020-07-23 PROCEDURE — 84145 PROCALCITONIN (PCT): CPT | Performed by: NURSE PRACTITIONER

## 2020-07-23 PROCEDURE — 99284 EMERGENCY DEPT VISIT MOD MDM: CPT

## 2020-07-23 PROCEDURE — 86140 C-REACTIVE PROTEIN: CPT | Performed by: NURSE PRACTITIONER

## 2020-07-23 PROCEDURE — 0 IOPAMIDOL PER 1 ML: Performed by: EMERGENCY MEDICINE

## 2020-07-23 PROCEDURE — 85025 COMPLETE CBC W/AUTO DIFF WBC: CPT | Performed by: EMERGENCY MEDICINE

## 2020-07-23 PROCEDURE — 87040 BLOOD CULTURE FOR BACTERIA: CPT | Performed by: NURSE PRACTITIONER

## 2020-07-23 PROCEDURE — 0100U HC BIOFIRE FILMARRAY RESP PANEL 2: CPT | Performed by: NURSE PRACTITIONER

## 2020-07-23 PROCEDURE — U0004 COV-19 TEST NON-CDC HGH THRU: HCPCS | Performed by: NURSE PRACTITIONER

## 2020-07-23 PROCEDURE — 83615 LACTATE (LD) (LDH) ENZYME: CPT | Performed by: NURSE PRACTITIONER

## 2020-07-23 PROCEDURE — 71045 X-RAY EXAM CHEST 1 VIEW: CPT

## 2020-07-23 PROCEDURE — 87086 URINE CULTURE/COLONY COUNT: CPT | Performed by: NURSE PRACTITIONER

## 2020-07-23 PROCEDURE — 83880 ASSAY OF NATRIURETIC PEPTIDE: CPT | Performed by: NURSE PRACTITIONER

## 2020-07-23 PROCEDURE — 80053 COMPREHEN METABOLIC PANEL: CPT | Performed by: EMERGENCY MEDICINE

## 2020-07-23 RX ORDER — SODIUM CHLORIDE 0.9 % (FLUSH) 0.9 %
10 SYRINGE (ML) INJECTION AS NEEDED
Status: DISCONTINUED | OUTPATIENT
Start: 2020-07-23 | End: 2020-07-23 | Stop reason: HOSPADM

## 2020-07-23 RX ADMIN — SODIUM CHLORIDE 1000 ML: 9 INJECTION, SOLUTION INTRAVENOUS at 17:56

## 2020-07-23 RX ADMIN — IOPAMIDOL 60 ML: 755 INJECTION, SOLUTION INTRAVENOUS at 19:50

## 2020-07-24 ENCOUNTER — PATIENT OUTREACH (OUTPATIENT)
Dept: CASE MANAGEMENT | Facility: OTHER | Age: 45
End: 2020-07-24

## 2020-07-24 ENCOUNTER — TELEPHONE (OUTPATIENT)
Dept: EMERGENCY DEPT | Facility: HOSPITAL | Age: 45
End: 2020-07-24

## 2020-07-24 LAB
BACTERIA SPEC AEROBE CULT: NORMAL
REF LAB TEST METHOD: NORMAL
SARS-COV-2 RNA RESP QL NAA+PROBE: NOT DETECTED

## 2020-07-24 NOTE — OUTREACH NOTE
ED Potential Covid Discharge Follow-up    Pt contacted regarding ED visit 7/23/2020 with chief c/o generalized weakness.  Covid 19 testing was done, however results are pending.  States she is feeling about the same as yest with her generalized weakness and sob.  Has not tried to eat or drink yet this am, but denies any n/v and states I think I will be able to eat and drink.   Discussed importance of staying hydrated.  Symptoms that would warrant return to ED discussed.  She has a follow up call with her rheumatologist today, Dr. Alley Castro, who actually sent her to ED yest.  She states she is quarantining at home. Lives with her  and 11 year old daughter.  Discussed using separate bathrooms and all the other general Covid 19 precautions including social distancing, good handwashing, disinfecting hard surfaces and wearing mask when out.  Verbalized understanding.  She denies any transportation issues or food insecurities.  Pt was brief with answers, but appreciative for the call.  She denies any needs at present.        Serene Sahni RN  Ambulatory     7/24/2020, 09:54

## 2020-07-28 LAB
BACTERIA SPEC AEROBE CULT: NORMAL
BACTERIA SPEC AEROBE CULT: NORMAL

## 2020-08-17 ENCOUNTER — EPISODE CHANGES (OUTPATIENT)
Dept: CASE MANAGEMENT | Facility: OTHER | Age: 45
End: 2020-08-17

## 2020-10-13 ENCOUNTER — TRANSCRIBE ORDERS (OUTPATIENT)
Dept: ADMINISTRATIVE | Facility: HOSPITAL | Age: 45
End: 2020-10-13

## 2020-10-13 ENCOUNTER — HOSPITAL ENCOUNTER (OUTPATIENT)
Dept: ULTRASOUND IMAGING | Facility: HOSPITAL | Age: 45
Discharge: HOME | End: 2020-10-13
Admitting: INTERNAL MEDICINE

## 2020-10-13 DIAGNOSIS — R10.13 EPIGASTRIC PAIN: ICD-10-CM

## 2020-10-13 DIAGNOSIS — R10.13 EPIGASTRIC PAIN: Primary | ICD-10-CM

## 2020-10-13 PROCEDURE — 76705 ECHO EXAM OF ABDOMEN: CPT

## 2020-10-15 ENCOUNTER — TRANSCRIBE ORDERS (OUTPATIENT)
Dept: ADMINISTRATIVE | Facility: HOSPITAL | Age: 45
End: 2020-10-15

## 2020-10-15 DIAGNOSIS — R10.11 RIGHT UPPER QUADRANT PAIN: Primary | ICD-10-CM

## 2020-10-16 RX ORDER — NORETHINDRONE ACETATE AND ETHINYL ESTRADIOL, ETHINYL ESTRADIOL AND FERROUS FUMARATE 1MG-10(24)
KIT ORAL
Qty: 90 TABLET | Refills: 0 | OUTPATIENT
Start: 2020-10-16

## 2020-10-30 ENCOUNTER — HOSPITAL ENCOUNTER (OUTPATIENT)
Dept: NUCLEAR MEDICINE | Facility: HOSPITAL | Age: 45
Discharge: HOME | End: 2020-10-30

## 2020-10-30 ENCOUNTER — TRANSCRIBE ORDERS (OUTPATIENT)
Dept: ADMINISTRATIVE | Facility: HOSPITAL | Age: 45
End: 2020-10-30

## 2020-10-30 VITALS — WEIGHT: 150 LBS | BODY MASS INDEX: 24.21 KG/M2

## 2020-10-30 DIAGNOSIS — R10.11 RIGHT UPPER QUADRANT PAIN: ICD-10-CM

## 2020-10-30 DIAGNOSIS — R10.11 RIGHT UPPER QUADRANT PAIN: Primary | ICD-10-CM

## 2020-10-30 PROCEDURE — 78227 HEPATOBIL SYST IMAGE W/DRUG: CPT

## 2020-10-30 PROCEDURE — A9537 TC99M MEBROFENIN: HCPCS | Performed by: INTERNAL MEDICINE

## 2020-10-30 PROCEDURE — 0 TECHNETIUM TC 99M MEBROFENIN KIT: Performed by: INTERNAL MEDICINE

## 2020-10-30 PROCEDURE — 25010000002 SINCALIDE PER 5 MCG: Performed by: INTERNAL MEDICINE

## 2020-10-30 RX ORDER — KIT FOR THE PREPARATION OF TECHNETIUM TC 99M MEBROFENIN 45 MG/10ML
1 INJECTION, POWDER, LYOPHILIZED, FOR SOLUTION INTRAVENOUS
Status: COMPLETED | OUTPATIENT
Start: 2020-10-30 | End: 2020-10-30

## 2020-10-30 RX ADMIN — SINCALIDE 1.4 MCG: 5 INJECTION, POWDER, LYOPHILIZED, FOR SOLUTION INTRAVENOUS at 15:50

## 2020-10-30 RX ADMIN — MEBROFENIN 1 DOSE: 45 INJECTION, POWDER, LYOPHILIZED, FOR SOLUTION INTRAVENOUS at 14:45

## 2020-11-05 ENCOUNTER — HOSPITAL ENCOUNTER (OUTPATIENT)
Facility: HOSPITAL | Age: 45
Discharge: HOME | End: 2020-11-07
Attending: EMERGENCY MEDICINE | Admitting: SURGERY

## 2020-11-05 DIAGNOSIS — K80.50 BILIARY COLIC: ICD-10-CM

## 2020-11-05 DIAGNOSIS — K82.8 BILIARY DYSKINESIA: ICD-10-CM

## 2020-11-05 DIAGNOSIS — K81.9 CHOLECYSTITIS: ICD-10-CM

## 2020-11-05 DIAGNOSIS — R10.10 INTRACTABLE UPPER ABDOMINAL PAIN: Primary | ICD-10-CM

## 2020-11-05 PROBLEM — R20.0 NUMBNESS AND TINGLING OF BOTH FEET: Status: RESOLVED | Noted: 2018-06-19 | Resolved: 2020-11-05

## 2020-11-05 PROBLEM — R20.2 NUMBNESS AND TINGLING OF BOTH FEET: Status: RESOLVED | Noted: 2018-06-19 | Resolved: 2020-11-05

## 2020-11-05 LAB
ALBUMIN SERPL-MCNC: 5.1 G/DL (ref 3.5–5.2)
ALBUMIN/GLOB SERPL: 1.8 G/DL
ALP SERPL-CCNC: 73 U/L (ref 39–117)
ALT SERPL W P-5'-P-CCNC: 7 U/L (ref 1–33)
ANION GAP SERPL CALCULATED.3IONS-SCNC: 12 MMOL/L (ref 5–15)
AST SERPL-CCNC: 13 U/L (ref 1–32)
BACTERIA UR QL AUTO: ABNORMAL /HPF
BASOPHILS # BLD AUTO: 0.03 10*3/MM3 (ref 0–0.2)
BASOPHILS NFR BLD AUTO: 0.4 % (ref 0–1.5)
BILIRUB SERPL-MCNC: <0.2 MG/DL (ref 0–1.2)
BILIRUB UR QL STRIP: NEGATIVE
BUN SERPL-MCNC: 19 MG/DL (ref 6–20)
BUN/CREAT SERPL: 19.6 (ref 7–25)
CALCIUM SPEC-SCNC: 10 MG/DL (ref 8.6–10.5)
CHLORIDE SERPL-SCNC: 104 MMOL/L (ref 98–107)
CLARITY UR: ABNORMAL
CO2 SERPL-SCNC: 26 MMOL/L (ref 22–29)
COLOR UR: YELLOW
CREAT SERPL-MCNC: 0.97 MG/DL (ref 0.57–1)
DEPRECATED RDW RBC AUTO: 51.1 FL (ref 37–54)
EOSINOPHIL # BLD AUTO: 0 10*3/MM3 (ref 0–0.4)
EOSINOPHIL NFR BLD AUTO: 0 % (ref 0.3–6.2)
ERYTHROCYTE [DISTWIDTH] IN BLOOD BY AUTOMATED COUNT: 14.5 % (ref 12.3–15.4)
GFR SERPL CREATININE-BSD FRML MDRD: 62 ML/MIN/1.73
GLOBULIN UR ELPH-MCNC: 2.8 GM/DL
GLUCOSE SERPL-MCNC: 107 MG/DL (ref 65–99)
GLUCOSE UR STRIP-MCNC: NEGATIVE MG/DL
HCT VFR BLD AUTO: 39.5 % (ref 34–46.6)
HGB BLD-MCNC: 12.4 G/DL (ref 12–15.9)
HGB UR QL STRIP.AUTO: NEGATIVE
HOLD SPECIMEN: NORMAL
HOLD SPECIMEN: NORMAL
HYALINE CASTS UR QL AUTO: ABNORMAL /LPF
IMM GRANULOCYTES # BLD AUTO: 0.04 10*3/MM3 (ref 0–0.05)
IMM GRANULOCYTES NFR BLD AUTO: 0.5 % (ref 0–0.5)
KETONES UR QL STRIP: NEGATIVE
LEUKOCYTE ESTERASE UR QL STRIP.AUTO: ABNORMAL
LIPASE SERPL-CCNC: 48 U/L (ref 13–60)
LYMPHOCYTES # BLD AUTO: 1.12 10*3/MM3 (ref 0.7–3.1)
LYMPHOCYTES NFR BLD AUTO: 13.4 % (ref 19.6–45.3)
MCH RBC QN AUTO: 30.6 PG (ref 26.6–33)
MCHC RBC AUTO-ENTMCNC: 31.4 G/DL (ref 31.5–35.7)
MCV RBC AUTO: 97.5 FL (ref 79–97)
MONOCYTES # BLD AUTO: 0.47 10*3/MM3 (ref 0.1–0.9)
MONOCYTES NFR BLD AUTO: 5.6 % (ref 5–12)
NEUTROPHILS NFR BLD AUTO: 6.68 10*3/MM3 (ref 1.7–7)
NEUTROPHILS NFR BLD AUTO: 80.1 % (ref 42.7–76)
NITRITE UR QL STRIP: NEGATIVE
NRBC BLD AUTO-RTO: 0 /100 WBC (ref 0–0.2)
PH UR STRIP.AUTO: 5.5 [PH] (ref 5–8)
PLATELET # BLD AUTO: 304 10*3/MM3 (ref 140–450)
PMV BLD AUTO: 9.8 FL (ref 6–12)
POTASSIUM SERPL-SCNC: 3.8 MMOL/L (ref 3.5–5.2)
PROT SERPL-MCNC: 7.9 G/DL (ref 6–8.5)
PROT UR QL STRIP: NEGATIVE
RBC # BLD AUTO: 4.05 10*6/MM3 (ref 3.77–5.28)
RBC # UR: ABNORMAL /HPF
REF LAB TEST METHOD: ABNORMAL
SARS-COV-2 RDRP RESP QL NAA+PROBE: NOT DETECTED
SODIUM SERPL-SCNC: 142 MMOL/L (ref 136–145)
SP GR UR STRIP: 1.02 (ref 1–1.03)
SQUAMOUS #/AREA URNS HPF: ABNORMAL /HPF
UROBILINOGEN UR QL STRIP: ABNORMAL
WBC # BLD AUTO: 8.34 10*3/MM3 (ref 3.4–10.8)
WBC UR QL AUTO: ABNORMAL /HPF
WHOLE BLOOD HOLD SPECIMEN: NORMAL
WHOLE BLOOD HOLD SPECIMEN: NORMAL

## 2020-11-05 PROCEDURE — 99284 EMERGENCY DEPT VISIT MOD MDM: CPT

## 2020-11-05 PROCEDURE — 96375 TX/PRO/DX INJ NEW DRUG ADDON: CPT

## 2020-11-05 PROCEDURE — 96374 THER/PROPH/DIAG INJ IV PUSH: CPT

## 2020-11-05 PROCEDURE — G0378 HOSPITAL OBSERVATION PER HR: HCPCS

## 2020-11-05 PROCEDURE — C9803 HOPD COVID-19 SPEC COLLECT: HCPCS

## 2020-11-05 PROCEDURE — 96361 HYDRATE IV INFUSION ADD-ON: CPT

## 2020-11-05 PROCEDURE — 80053 COMPREHEN METABOLIC PANEL: CPT

## 2020-11-05 PROCEDURE — 81001 URINALYSIS AUTO W/SCOPE: CPT | Performed by: EMERGENCY MEDICINE

## 2020-11-05 PROCEDURE — 25010000002 HYDROMORPHONE PER 4 MG: Performed by: EMERGENCY MEDICINE

## 2020-11-05 PROCEDURE — 83690 ASSAY OF LIPASE: CPT

## 2020-11-05 PROCEDURE — 99219 PR INITIAL OBSERVATION CARE/DAY 50 MINUTES: CPT | Performed by: NURSE PRACTITIONER

## 2020-11-05 PROCEDURE — 81025 URINE PREGNANCY TEST: CPT | Performed by: SURGERY

## 2020-11-05 PROCEDURE — 25010000002 ONDANSETRON PER 1 MG: Performed by: PHYSICIAN ASSISTANT

## 2020-11-05 PROCEDURE — 87635 SARS-COV-2 COVID-19 AMP PRB: CPT | Performed by: NURSE PRACTITIONER

## 2020-11-05 PROCEDURE — 25010000002 HYDROMORPHONE PER 4 MG: Performed by: FAMILY MEDICINE

## 2020-11-05 PROCEDURE — 96376 TX/PRO/DX INJ SAME DRUG ADON: CPT

## 2020-11-05 PROCEDURE — 85025 COMPLETE CBC W/AUTO DIFF WBC: CPT

## 2020-11-05 RX ORDER — SODIUM CHLORIDE 0.9 % (FLUSH) 0.9 %
10 SYRINGE (ML) INJECTION EVERY 12 HOURS SCHEDULED
Status: DISCONTINUED | OUTPATIENT
Start: 2020-11-05 | End: 2020-11-07 | Stop reason: HOSPADM

## 2020-11-05 RX ORDER — CHOLECALCIFEROL (VITAMIN D3) 125 MCG
500 CAPSULE ORAL DAILY
COMMUNITY

## 2020-11-05 RX ORDER — ONDANSETRON 4 MG/1
4 TABLET, FILM COATED ORAL EVERY 6 HOURS PRN
Status: DISCONTINUED | OUTPATIENT
Start: 2020-11-05 | End: 2020-11-07 | Stop reason: HOSPADM

## 2020-11-05 RX ORDER — SODIUM CHLORIDE 9 MG/ML
75 INJECTION, SOLUTION INTRAVENOUS CONTINUOUS
Status: DISCONTINUED | OUTPATIENT
Start: 2020-11-05 | End: 2020-11-07 | Stop reason: HOSPADM

## 2020-11-05 RX ORDER — HYDROMORPHONE HYDROCHLORIDE 1 MG/ML
0.5 INJECTION, SOLUTION INTRAMUSCULAR; INTRAVENOUS; SUBCUTANEOUS
Status: DISCONTINUED | OUTPATIENT
Start: 2020-11-05 | End: 2020-11-07 | Stop reason: HOSPADM

## 2020-11-05 RX ORDER — HYDROMORPHONE HYDROCHLORIDE 1 MG/ML
0.5 INJECTION, SOLUTION INTRAMUSCULAR; INTRAVENOUS; SUBCUTANEOUS ONCE
Status: COMPLETED | OUTPATIENT
Start: 2020-11-05 | End: 2020-11-05

## 2020-11-05 RX ORDER — SODIUM CHLORIDE 0.9 % (FLUSH) 0.9 %
10 SYRINGE (ML) INJECTION AS NEEDED
Status: DISCONTINUED | OUTPATIENT
Start: 2020-11-05 | End: 2020-11-07 | Stop reason: HOSPADM

## 2020-11-05 RX ORDER — ONDANSETRON 2 MG/ML
4 INJECTION INTRAMUSCULAR; INTRAVENOUS ONCE
Status: COMPLETED | OUTPATIENT
Start: 2020-11-05 | End: 2020-11-05

## 2020-11-05 RX ORDER — ONDANSETRON 2 MG/ML
4 INJECTION INTRAMUSCULAR; INTRAVENOUS EVERY 6 HOURS PRN
Status: DISCONTINUED | OUTPATIENT
Start: 2020-11-05 | End: 2020-11-07 | Stop reason: HOSPADM

## 2020-11-05 RX ORDER — SODIUM CHLORIDE 9 MG/ML
10 INJECTION INTRAVENOUS AS NEEDED
Status: DISCONTINUED | OUTPATIENT
Start: 2020-11-05 | End: 2020-11-07 | Stop reason: HOSPADM

## 2020-11-05 RX ADMIN — HYDROMORPHONE HYDROCHLORIDE 0.5 MG: 1 INJECTION, SOLUTION INTRAMUSCULAR; INTRAVENOUS; SUBCUTANEOUS at 19:48

## 2020-11-05 RX ADMIN — SODIUM CHLORIDE 75 ML/HR: 9 INJECTION, SOLUTION INTRAVENOUS at 23:47

## 2020-11-05 RX ADMIN — SODIUM CHLORIDE 2000 ML: 9 INJECTION, SOLUTION INTRAVENOUS at 19:52

## 2020-11-05 RX ADMIN — HYDROMORPHONE HYDROCHLORIDE 0.5 MG: 1 INJECTION, SOLUTION INTRAMUSCULAR; INTRAVENOUS; SUBCUTANEOUS at 23:08

## 2020-11-05 RX ADMIN — ONDANSETRON 4 MG: 2 INJECTION INTRAMUSCULAR; INTRAVENOUS at 19:48

## 2020-11-05 RX ADMIN — SODIUM CHLORIDE, PRESERVATIVE FREE 10 ML: 5 INJECTION INTRAVENOUS at 23:08

## 2020-11-06 ENCOUNTER — ANESTHESIA EVENT (OUTPATIENT)
Dept: PERIOP | Facility: HOSPITAL | Age: 45
End: 2020-11-06

## 2020-11-06 ENCOUNTER — ANESTHESIA (OUTPATIENT)
Dept: PERIOP | Facility: HOSPITAL | Age: 45
End: 2020-11-06

## 2020-11-06 PROBLEM — K82.8 DYSFUNCTIONAL GALLBLADDER: Status: RESOLVED | Noted: 2020-11-05 | Resolved: 2020-11-06

## 2020-11-06 LAB
ABO GROUP BLD: NORMAL
ANION GAP SERPL CALCULATED.3IONS-SCNC: 8 MMOL/L (ref 5–15)
APTT PPP: 26.5 SECONDS (ref 24–37)
B-HCG UR QL: NEGATIVE
B-HCG UR QL: NEGATIVE
BLD GP AB SCN SERPL QL: NEGATIVE
BUN SERPL-MCNC: 15 MG/DL (ref 6–20)
BUN/CREAT SERPL: 17.4 (ref 7–25)
CALCIUM SPEC-SCNC: 8.6 MG/DL (ref 8.6–10.5)
CHLORIDE SERPL-SCNC: 111 MMOL/L (ref 98–107)
CO2 SERPL-SCNC: 24 MMOL/L (ref 22–29)
CREAT SERPL-MCNC: 0.86 MG/DL (ref 0.57–1)
DEPRECATED RDW RBC AUTO: 54.2 FL (ref 37–54)
ERYTHROCYTE [DISTWIDTH] IN BLOOD BY AUTOMATED COUNT: 14.5 % (ref 12.3–15.4)
GFR SERPL CREATININE-BSD FRML MDRD: 71 ML/MIN/1.73
GLUCOSE SERPL-MCNC: 79 MG/DL (ref 65–99)
HCT VFR BLD AUTO: 33.2 % (ref 34–46.6)
HGB BLD-MCNC: 10.2 G/DL (ref 12–15.9)
INR PPP: 1.05 (ref 0.85–1.16)
INTERNAL NEGATIVE CONTROL: NEGATIVE
INTERNAL POSITIVE CONTROL: POSITIVE
Lab: NORMAL
MAGNESIUM SERPL-MCNC: 1.9 MG/DL (ref 1.6–2.6)
MCH RBC QN AUTO: 31.6 PG (ref 26.6–33)
MCHC RBC AUTO-ENTMCNC: 30.7 G/DL (ref 31.5–35.7)
MCV RBC AUTO: 102.8 FL (ref 79–97)
PLATELET # BLD AUTO: 207 10*3/MM3 (ref 140–450)
PMV BLD AUTO: 10 FL (ref 6–12)
POTASSIUM SERPL-SCNC: 3.7 MMOL/L (ref 3.5–5.2)
PROTHROMBIN TIME: 13.4 SECONDS (ref 11.5–14)
QT INTERVAL: 366 MS
QT INTERVAL: 386 MS
QTC INTERVAL: 427 MS
QTC INTERVAL: 442 MS
RBC # BLD AUTO: 3.23 10*6/MM3 (ref 3.77–5.28)
RH BLD: POSITIVE
SODIUM SERPL-SCNC: 143 MMOL/L (ref 136–145)
T&S EXPIRATION DATE: NORMAL
WBC # BLD AUTO: 5.8 10*3/MM3 (ref 3.4–10.8)

## 2020-11-06 PROCEDURE — 25010000002 HYDROMORPHONE PER 4 MG: Performed by: FAMILY MEDICINE

## 2020-11-06 PROCEDURE — 99226 PR SBSQ OBSERVATION CARE/DAY 35 MINUTES: CPT | Performed by: INTERNAL MEDICINE

## 2020-11-06 PROCEDURE — 86900 BLOOD TYPING SEROLOGIC ABO: CPT | Performed by: FAMILY MEDICINE

## 2020-11-06 PROCEDURE — 96376 TX/PRO/DX INJ SAME DRUG ADON: CPT

## 2020-11-06 PROCEDURE — 93010 ELECTROCARDIOGRAM REPORT: CPT | Performed by: INTERNAL MEDICINE

## 2020-11-06 PROCEDURE — G0378 HOSPITAL OBSERVATION PER HR: HCPCS

## 2020-11-06 PROCEDURE — 25010000002 NEOSTIGMINE 10 MG/10ML SOLUTION: Performed by: ANESTHESIOLOGY

## 2020-11-06 PROCEDURE — 85610 PROTHROMBIN TIME: CPT | Performed by: FAMILY MEDICINE

## 2020-11-06 PROCEDURE — 25010000002 FENTANYL CITRATE (PF) 100 MCG/2ML SOLUTION: Performed by: ANESTHESIOLOGY

## 2020-11-06 PROCEDURE — 25010000002 ONDANSETRON PER 1 MG: Performed by: SURGERY

## 2020-11-06 PROCEDURE — 25010000002 ONDANSETRON PER 1 MG: Performed by: NURSE PRACTITIONER

## 2020-11-06 PROCEDURE — 86901 BLOOD TYPING SEROLOGIC RH(D): CPT | Performed by: FAMILY MEDICINE

## 2020-11-06 PROCEDURE — 81025 URINE PREGNANCY TEST: CPT | Performed by: ANESTHESIOLOGY

## 2020-11-06 PROCEDURE — 96375 TX/PRO/DX INJ NEW DRUG ADDON: CPT

## 2020-11-06 PROCEDURE — 86850 RBC ANTIBODY SCREEN: CPT | Performed by: FAMILY MEDICINE

## 2020-11-06 PROCEDURE — 85027 COMPLETE CBC AUTOMATED: CPT | Performed by: FAMILY MEDICINE

## 2020-11-06 PROCEDURE — 25010000002 KETOROLAC TROMETHAMINE PER 15 MG: Performed by: INTERNAL MEDICINE

## 2020-11-06 PROCEDURE — 85730 THROMBOPLASTIN TIME PARTIAL: CPT | Performed by: FAMILY MEDICINE

## 2020-11-06 PROCEDURE — 93005 ELECTROCARDIOGRAM TRACING: CPT | Performed by: NURSE PRACTITIONER

## 2020-11-06 PROCEDURE — 96361 HYDRATE IV INFUSION ADD-ON: CPT

## 2020-11-06 PROCEDURE — 25010000002 PROPOFOL 10 MG/ML EMULSION: Performed by: ANESTHESIOLOGY

## 2020-11-06 PROCEDURE — 88304 TISSUE EXAM BY PATHOLOGIST: CPT | Performed by: SURGERY

## 2020-11-06 PROCEDURE — 83735 ASSAY OF MAGNESIUM: CPT | Performed by: FAMILY MEDICINE

## 2020-11-06 PROCEDURE — 25010000002 HYDROMORPHONE PER 4 MG: Performed by: SURGERY

## 2020-11-06 PROCEDURE — 25010000002 HYDROMORPHONE PER 4 MG: Performed by: ANESTHESIOLOGY

## 2020-11-06 PROCEDURE — 25010000002 DEXAMETHASONE PER 1 MG: Performed by: ANESTHESIOLOGY

## 2020-11-06 PROCEDURE — 80048 BASIC METABOLIC PNL TOTAL CA: CPT | Performed by: FAMILY MEDICINE

## 2020-11-06 PROCEDURE — 25010000002 SUCCINYLCHOLINE PER 20 MG: Performed by: ANESTHESIOLOGY

## 2020-11-06 PROCEDURE — 25010000003 CEFAZOLIN IN DEXTROSE 2-4 GM/100ML-% SOLUTION: Performed by: SURGERY

## 2020-11-06 PROCEDURE — 93005 ELECTROCARDIOGRAM TRACING: CPT | Performed by: ANESTHESIOLOGY

## 2020-11-06 RX ORDER — NEOSTIGMINE METHYLSULFATE 1 MG/ML
INJECTION, SOLUTION INTRAVENOUS AS NEEDED
Status: DISCONTINUED | OUTPATIENT
Start: 2020-11-06 | End: 2020-11-06 | Stop reason: SURG

## 2020-11-06 RX ORDER — MIDAZOLAM HYDROCHLORIDE 1 MG/ML
2 INJECTION INTRAMUSCULAR; INTRAVENOUS ONCE
Status: DISCONTINUED | OUTPATIENT
Start: 2020-11-06 | End: 2020-11-06

## 2020-11-06 RX ORDER — SUCCINYLCHOLINE CHLORIDE 20 MG/ML
INJECTION INTRAMUSCULAR; INTRAVENOUS AS NEEDED
Status: DISCONTINUED | OUTPATIENT
Start: 2020-11-06 | End: 2020-11-06 | Stop reason: SURG

## 2020-11-06 RX ORDER — MAGNESIUM HYDROXIDE 1200 MG/15ML
LIQUID ORAL AS NEEDED
Status: DISCONTINUED | OUTPATIENT
Start: 2020-11-06 | End: 2020-11-06 | Stop reason: HOSPADM

## 2020-11-06 RX ORDER — HYDROMORPHONE HYDROCHLORIDE 1 MG/ML
0.5 INJECTION, SOLUTION INTRAMUSCULAR; INTRAVENOUS; SUBCUTANEOUS
Status: DISCONTINUED | OUTPATIENT
Start: 2020-11-06 | End: 2020-11-06 | Stop reason: HOSPADM

## 2020-11-06 RX ORDER — FAMOTIDINE 20 MG/1
20 TABLET, FILM COATED ORAL ONCE
Status: CANCELLED | OUTPATIENT
Start: 2020-11-06 | End: 2020-11-06

## 2020-11-06 RX ORDER — SODIUM CHLORIDE, SODIUM LACTATE, POTASSIUM CHLORIDE, CALCIUM CHLORIDE 600; 310; 30; 20 MG/100ML; MG/100ML; MG/100ML; MG/100ML
9 INJECTION, SOLUTION INTRAVENOUS CONTINUOUS
Status: DISCONTINUED | OUTPATIENT
Start: 2020-11-06 | End: 2020-11-06

## 2020-11-06 RX ORDER — FENTANYL CITRATE 50 UG/ML
INJECTION, SOLUTION INTRAMUSCULAR; INTRAVENOUS AS NEEDED
Status: DISCONTINUED | OUTPATIENT
Start: 2020-11-06 | End: 2020-11-06 | Stop reason: SURG

## 2020-11-06 RX ORDER — ROCURONIUM BROMIDE 10 MG/ML
INJECTION, SOLUTION INTRAVENOUS AS NEEDED
Status: DISCONTINUED | OUTPATIENT
Start: 2020-11-06 | End: 2020-11-06 | Stop reason: SURG

## 2020-11-06 RX ORDER — ONDANSETRON 2 MG/ML
4 INJECTION INTRAMUSCULAR; INTRAVENOUS EVERY 6 HOURS PRN
Status: DISCONTINUED | OUTPATIENT
Start: 2020-11-06 | End: 2020-11-07 | Stop reason: HOSPADM

## 2020-11-06 RX ORDER — SODIUM CHLORIDE 0.9 % (FLUSH) 0.9 %
10 SYRINGE (ML) INJECTION AS NEEDED
Status: DISCONTINUED | OUTPATIENT
Start: 2020-11-06 | End: 2020-11-06

## 2020-11-06 RX ORDER — PROPOFOL 10 MG/ML
VIAL (ML) INTRAVENOUS AS NEEDED
Status: DISCONTINUED | OUTPATIENT
Start: 2020-11-06 | End: 2020-11-06 | Stop reason: SURG

## 2020-11-06 RX ORDER — OXYCODONE HYDROCHLORIDE AND ACETAMINOPHEN 5; 325 MG/1; MG/1
2 TABLET ORAL EVERY 4 HOURS PRN
Status: DISCONTINUED | OUTPATIENT
Start: 2020-11-06 | End: 2020-11-07 | Stop reason: HOSPADM

## 2020-11-06 RX ORDER — FENTANYL CITRATE 50 UG/ML
50 INJECTION, SOLUTION INTRAMUSCULAR; INTRAVENOUS
Status: DISCONTINUED | OUTPATIENT
Start: 2020-11-06 | End: 2020-11-06 | Stop reason: HOSPADM

## 2020-11-06 RX ORDER — KETOROLAC TROMETHAMINE 30 MG/ML
30 INJECTION, SOLUTION INTRAMUSCULAR; INTRAVENOUS ONCE
Status: COMPLETED | OUTPATIENT
Start: 2020-11-06 | End: 2020-11-06

## 2020-11-06 RX ORDER — FAMOTIDINE 20 MG/1
20 TABLET, FILM COATED ORAL ONCE
Status: COMPLETED | OUTPATIENT
Start: 2020-11-06 | End: 2020-11-06

## 2020-11-06 RX ORDER — CEFAZOLIN SODIUM 2 G/100ML
2 INJECTION, SOLUTION INTRAVENOUS ONCE
Status: COMPLETED | OUTPATIENT
Start: 2020-11-06 | End: 2020-11-06

## 2020-11-06 RX ORDER — SODIUM CHLORIDE 0.9 % (FLUSH) 0.9 %
10 SYRINGE (ML) INJECTION EVERY 12 HOURS SCHEDULED
Status: DISCONTINUED | OUTPATIENT
Start: 2020-11-06 | End: 2020-11-06

## 2020-11-06 RX ORDER — BUPIVACAINE HYDROCHLORIDE AND EPINEPHRINE 2.5; 5 MG/ML; UG/ML
INJECTION, SOLUTION EPIDURAL; INFILTRATION; INTRACAUDAL; PERINEURAL AS NEEDED
Status: DISCONTINUED | OUTPATIENT
Start: 2020-11-06 | End: 2020-11-06 | Stop reason: HOSPADM

## 2020-11-06 RX ORDER — HYDROXYCHLOROQUINE SULFATE 200 MG/1
300 TABLET, FILM COATED ORAL DAILY
Status: DISCONTINUED | OUTPATIENT
Start: 2020-11-06 | End: 2020-11-07 | Stop reason: HOSPADM

## 2020-11-06 RX ORDER — LIDOCAINE HYDROCHLORIDE 10 MG/ML
0.5 INJECTION, SOLUTION EPIDURAL; INFILTRATION; INTRACAUDAL; PERINEURAL ONCE AS NEEDED
Status: DISCONTINUED | OUTPATIENT
Start: 2020-11-06 | End: 2020-11-06

## 2020-11-06 RX ORDER — LIDOCAINE HYDROCHLORIDE 10 MG/ML
0.5 INJECTION, SOLUTION EPIDURAL; INFILTRATION; INTRACAUDAL; PERINEURAL ONCE AS NEEDED
Status: COMPLETED | OUTPATIENT
Start: 2020-11-06 | End: 2020-11-06

## 2020-11-06 RX ORDER — GLYCOPYRROLATE 0.2 MG/ML
INJECTION INTRAMUSCULAR; INTRAVENOUS AS NEEDED
Status: DISCONTINUED | OUTPATIENT
Start: 2020-11-06 | End: 2020-11-06 | Stop reason: SURG

## 2020-11-06 RX ORDER — DEXAMETHASONE SODIUM PHOSPHATE 4 MG/ML
INJECTION, SOLUTION INTRA-ARTICULAR; INTRALESIONAL; INTRAMUSCULAR; INTRAVENOUS; SOFT TISSUE AS NEEDED
Status: DISCONTINUED | OUTPATIENT
Start: 2020-11-06 | End: 2020-11-06 | Stop reason: SURG

## 2020-11-06 RX ORDER — LIDOCAINE HYDROCHLORIDE 10 MG/ML
INJECTION, SOLUTION EPIDURAL; INFILTRATION; INTRACAUDAL; PERINEURAL AS NEEDED
Status: DISCONTINUED | OUTPATIENT
Start: 2020-11-06 | End: 2020-11-06 | Stop reason: SURG

## 2020-11-06 RX ORDER — AMITRIPTYLINE HYDROCHLORIDE 10 MG/1
10 TABLET, FILM COATED ORAL NIGHTLY
Status: DISCONTINUED | OUTPATIENT
Start: 2020-11-06 | End: 2020-11-07 | Stop reason: HOSPADM

## 2020-11-06 RX ORDER — FAMOTIDINE 10 MG/ML
20 INJECTION, SOLUTION INTRAVENOUS ONCE
Status: DISCONTINUED | OUTPATIENT
Start: 2020-11-06 | End: 2020-11-06

## 2020-11-06 RX ORDER — FAMOTIDINE 10 MG/ML
20 INJECTION, SOLUTION INTRAVENOUS ONCE
Status: COMPLETED | OUTPATIENT
Start: 2020-11-06 | End: 2020-11-06

## 2020-11-06 RX ADMIN — ROCURONIUM BROMIDE 5 MG: 10 INJECTION INTRAVENOUS at 19:16

## 2020-11-06 RX ADMIN — EPHEDRINE SULFATE 10 MG: 50 INJECTION INTRAMUSCULAR; INTRAVENOUS; SUBCUTANEOUS at 19:37

## 2020-11-06 RX ADMIN — FENTANYL CITRATE 50 MCG: 0.05 INJECTION, SOLUTION INTRAMUSCULAR; INTRAVENOUS at 20:53

## 2020-11-06 RX ADMIN — FAMOTIDINE 20 MG: 10 INJECTION INTRAVENOUS at 10:27

## 2020-11-06 RX ADMIN — HYDROMORPHONE HYDROCHLORIDE 0.5 MG: 1 INJECTION, SOLUTION INTRAMUSCULAR; INTRAVENOUS; SUBCUTANEOUS at 06:58

## 2020-11-06 RX ADMIN — ONDANSETRON 4 MG: 2 INJECTION INTRAMUSCULAR; INTRAVENOUS at 19:36

## 2020-11-06 RX ADMIN — GLYCOPYRROLATE 0.2 MG: 0.2 INJECTION INTRAMUSCULAR; INTRAVENOUS at 20:12

## 2020-11-06 RX ADMIN — ROCURONIUM BROMIDE 10 MG: 10 INJECTION INTRAVENOUS at 19:38

## 2020-11-06 RX ADMIN — AMITRIPTYLINE HYDROCHLORIDE 10 MG: 10 TABLET, FILM COATED ORAL at 23:24

## 2020-11-06 RX ADMIN — HYDROXYCHLOROQUINE SULFATE 300 MG: 200 TABLET, FILM COATED ORAL at 09:30

## 2020-11-06 RX ADMIN — LIDOCAINE HYDROCHLORIDE 0.4 ML: 10 INJECTION, SOLUTION EPIDURAL; INFILTRATION; INTRACAUDAL; PERINEURAL at 14:59

## 2020-11-06 RX ADMIN — SODIUM CHLORIDE 75 ML/HR: 9 INJECTION, SOLUTION INTRAVENOUS at 09:28

## 2020-11-06 RX ADMIN — SUCCINYLCHOLINE CHLORIDE 140 MG: 20 INJECTION, SOLUTION INTRAMUSCULAR; INTRAVENOUS; PARENTERAL at 19:17

## 2020-11-06 RX ADMIN — NEOSTIGMINE 3 MG: 1 INJECTION INTRAVENOUS at 20:12

## 2020-11-06 RX ADMIN — FENTANYL CITRATE 100 MCG: 50 INJECTION, SOLUTION INTRAMUSCULAR; INTRAVENOUS at 19:16

## 2020-11-06 RX ADMIN — PROPOFOL 170 MG: 10 INJECTION, EMULSION INTRAVENOUS at 19:16

## 2020-11-06 RX ADMIN — LIDOCAINE HYDROCHLORIDE 50 MG: 10 INJECTION, SOLUTION EPIDURAL; INFILTRATION; INTRACAUDAL; PERINEURAL at 19:16

## 2020-11-06 RX ADMIN — SODIUM CHLORIDE, POTASSIUM CHLORIDE, SODIUM LACTATE AND CALCIUM CHLORIDE 9 ML/HR: 600; 310; 30; 20 INJECTION, SOLUTION INTRAVENOUS at 15:00

## 2020-11-06 RX ADMIN — FAMOTIDINE 20 MG: 20 TABLET, FILM COATED ORAL at 12:35

## 2020-11-06 RX ADMIN — ONDANSETRON 4 MG: 2 INJECTION INTRAMUSCULAR; INTRAVENOUS at 03:15

## 2020-11-06 RX ADMIN — DEXAMETHASONE SODIUM PHOSPHATE 8 MG: 4 INJECTION, SOLUTION INTRAMUSCULAR; INTRAVENOUS at 19:30

## 2020-11-06 RX ADMIN — DULOXETINE HYDROCHLORIDE 90 MG: 60 CAPSULE, DELAYED RELEASE ORAL at 09:29

## 2020-11-06 RX ADMIN — CEFAZOLIN SODIUM 2 G: 2 INJECTION, SOLUTION INTRAVENOUS at 19:23

## 2020-11-06 RX ADMIN — HYDROMORPHONE HYDROCHLORIDE 0.5 MG: 1 INJECTION, SOLUTION INTRAMUSCULAR; INTRAVENOUS; SUBCUTANEOUS at 21:35

## 2020-11-06 RX ADMIN — FENTANYL CITRATE 50 MCG: 0.05 INJECTION, SOLUTION INTRAMUSCULAR; INTRAVENOUS at 21:05

## 2020-11-06 RX ADMIN — HYDROMORPHONE HYDROCHLORIDE 0.5 MG: 1 INJECTION, SOLUTION INTRAMUSCULAR; INTRAVENOUS; SUBCUTANEOUS at 10:27

## 2020-11-06 RX ADMIN — GLYCOPYRROLATE 0.2 MG: 0.2 INJECTION INTRAMUSCULAR; INTRAVENOUS at 19:38

## 2020-11-06 RX ADMIN — HYDROMORPHONE HYDROCHLORIDE 0.5 MG: 1 INJECTION, SOLUTION INTRAMUSCULAR; INTRAVENOUS; SUBCUTANEOUS at 20:40

## 2020-11-06 RX ADMIN — SODIUM CHLORIDE, POTASSIUM CHLORIDE, SODIUM LACTATE AND CALCIUM CHLORIDE: 600; 310; 30; 20 INJECTION, SOLUTION INTRAVENOUS at 19:14

## 2020-11-06 RX ADMIN — HYDROMORPHONE HYDROCHLORIDE 0.5 MG: 1 INJECTION, SOLUTION INTRAMUSCULAR; INTRAVENOUS; SUBCUTANEOUS at 03:15

## 2020-11-06 RX ADMIN — ROCURONIUM BROMIDE 30 MG: 10 INJECTION INTRAVENOUS at 19:19

## 2020-11-06 RX ADMIN — KETOROLAC TROMETHAMINE 30 MG: 30 INJECTION, SOLUTION INTRAMUSCULAR at 12:35

## 2020-11-06 RX ADMIN — OXYCODONE HYDROCHLORIDE AND ACETAMINOPHEN 2 TABLET: 5; 325 TABLET ORAL at 23:23

## 2020-11-06 NOTE — CONSULTS
General Surgery Consultation Note    Date of Service: 11/6/2020  Silvia Tracy  [id number]  1975      Referring Provider: Alexis Ho MD    Location of Consult: inpatient     Reason for Consultation: biliary dyskinesia     History of Present Illness:  I am seeing, Silvia Marroquinjose daniel Hassanon, in consultation at request of Alexis Ho MD regarding abdominal pain and concern for biliary dyskinesia. She is a 45 year old lady with history of POTS, fibromyalgia, Sjogren's disease, and autoimmune disease on methotrexate who presented to the ED with complaints of abdominal pain. She was seen in my clinic yesterday as an add-on with complaints of 5 weeks of epigastric pain and a HIDA scan with a reduced ejection fraction of 9%. She was offered elective cholecystectomy and was scheduled for an operative date in the main OR, however after her clinic visit she presented to the [facility] Emergency Department without discussing this plan with the clinic staff or myself. She reports that when she got to her car the pain was unbearable. She describes this as the same epigastric pain that she has had for the last several weeks. This is located in her epigastrium and radiates to the back.  She reports that she has had significant nausea overnight.     Problems Addressed this Visit        Nervous and Auditory    * (Principal) Intractable upper abdominal pain - Primary      Other Visit Diagnoses     Biliary colic        Biliary dyskinesia          Diagnoses       Codes Comments    Intractable upper abdominal pain    -  Primary ICD-10-CM: R10.10  ICD-9-CM: 789.09     Biliary colic     ICD-10-CM: K80.50  ICD-9-CM: 574.20     Biliary dyskinesia     ICD-10-CM: K82.8  ICD-9-CM: 575.8           PMHx:  Past Medical History:   Diagnosis Date   • Acute congestive heart failure (CMS/HCC)    • DWIGHT positive    • Anemia    • Anxiety    • Fibromyalgia    • History of Clostridium difficile     stool transplant 08/16   •  History of transfusion    • History of vertebral fracture (2016) 10/27/2016   • Migraine    • Migraine without aura and without status migrainosus, not intractable 10/28/2016   • Numbness and tingling of both feet 2018   • Numbness and tingling of left leg    • POTS (postural orthostatic tachycardia syndrome)    • Sjogren's disease (CMS/HCC)        Past Surgical History:  Past Surgical History:   •  SECTION   • TEMPOROMANDIBULAR JOINT SURGERY   • TUBAL ABDOMINAL LIGATION       Allergies:  Allergies   Allergen Reactions   • Iron Arrhythmia     IV ONLY       Medications:  No current facility-administered medications on file prior to encounter.      Current Outpatient Medications on File Prior to Encounter   Medication Sig Dispense Refill   • vitamin B-12 (CYANOCOBALAMIN) 500 MCG tablet Take 500 mcg by mouth Daily.     • acetaminophen (TYLENOL) 500 MG tablet Take 1,000 mg by mouth Every 6 (Six) Hours As Needed for moderate pain (4-6).     • amitriptyline (ELAVIL) 10 MG tablet Take 1 tablet by mouth Every Night. 30 tablet 2   • bisoprolol (ZEBeta) 5 MG tablet Take 5 mg by mouth Daily As Needed.     • dexlansoprazole (Dexilant) 60 MG capsule Take 60 mg by mouth Daily.     • Dietary Management Product (VASCULERA PO) Take  by mouth.     • DULoxetine (CYMBALTA) 30 MG capsule Take 90 mg by mouth Daily.     • FOLIC ACID-CHOLECALCIFEROL PO Take 1 tablet by mouth Daily.     • hydroxychloroquine (PLAQUENIL) 200 MG tablet Take 300 mg by mouth Daily.     • ivabradine HCl (Corlanor) 5 MG tablet tablet Take 5 mg by mouth Daily.     • Lifitegrast (Xiidra) 5 % ophthalmic solution Administer 1 drop to both eyes 2 (Two) Times a Day.     • melatonin 5 MG tablet tablet Take 10 mg by mouth Every Night.     • methotrexate 2.5 MG tablet Take 2.5 mg by mouth 1 (One) Time Per Week. 6 TABLETS PER WEEK, 15mg once a week on      • midodrine (PROAMATINE) 5 MG tablet Take 30 mg by mouth Daily.     • Multiple Vitamins-Minerals  "(MULTIVITAMIN ADULT PO) Take 1 dose by mouth Daily.     • Norethin-Eth Estrad-Fe Biphas (LO LOESTRIN FE PO) Take  by mouth.     • Topiramate ER (Trokendi XR) 200 MG capsule sustained-release 24 hr Take  by mouth Daily.     • vitamin D (ERGOCALCIFEROL) 84485 UNITS capsule capsule Take 5,000 Units by mouth Daily.           Current Facility-Administered Medications:   •  HYDROmorphone (DILAUDID) injection 0.5 mg, 0.5 mg, Intravenous, Q3H PRN, Helga Olivares DO, 0.5 mg at 11/06/20 0658  •  ondansetron (ZOFRAN) tablet 4 mg, 4 mg, Oral, Q6H PRN **OR** ondansetron (ZOFRAN) injection 4 mg, 4 mg, Intravenous, Q6H PRN, Nancy Preciado APRN, 4 mg at 11/06/20 0315  •  Sodium Chloride (PF) 0.9 % 10 mL, 10 mL, Intravenous, PRN, Yakov Morales MD  •  sodium chloride 0.9 % flush 10 mL, 10 mL, Intravenous, Q12H, Nancy Preciado APRN, 10 mL at 11/05/20 2308  •  sodium chloride 0.9 % flush 10 mL, 10 mL, Intravenous, PRN, Nancy Preciado APRN  •  sodium chloride 0.9 % infusion, 75 mL/hr, Intravenous, Continuous, Nancy Preciado APRN, Last Rate: 75 mL/hr at 11/06/20 0654, 75 mL/hr at 11/06/20 0654      Family History:  Family History   Problem Relation Age of Onset   • Scleroderma Mother    • Coronary artery disease Father    • COPD Father        Social History: Pt lives in [facility].    Tobacco use: Denies    EtOH use : Denies    Illicit drug use: Denies       Review of Systems:   Constitutional: No fevers, chills or malaise   Eyes: Denies visual changes    Cardiovascular: Denies chest pain, palpitations   Pulmonary: Denies cough or shortness of breath   Abdominal/ GI: See HPI    Genitourinary: Denies dysuria or hematuria   Musculoskeletal: Denies any but chronic joint aches, pains or deformities   Psychiatric: No recent mood changes   Neurologic: No paresthesias or loss of function    /68 (BP Location: Left arm, Patient Position: Lying)   Pulse 86   Temp 97.6 °F (36.4 °C) (Oral)   Resp 18   Ht 167.6 cm (66\")   Wt " 74.5 kg (164 lb 4.8 oz)   LMP  (LMP Unknown)   SpO2 97%   BMI 26.52 kg/m²   Body mass index is 26.52 kg/m².    Gen: awake, alert, moderate distress resting in bed   Head: Normocephalic, atraumatic.   Eyes: Pupils equal, round, react to light and accommodation.   Mouth: Oral mucosa without lesions,   Neck: No masses, lymphadenopathy or carotid bruits bilaterally   CV: Rhythm and rate regular, no murmurs, rubs or gallops  Lungs: Clear to auscultation bilaterally, not labored on room air   Abdomen: soft, not distended, tender to palpation in epigastrium, lower midline scar   Groin : No obvious hernias bilaterally   Extremities:  No cyanosis, clubbing or edema bilaterally  Lymphatics: No abnormal lymphadenopathy appreciated   Neurologic: No gross deficits         CBC  Results from last 7 days   Lab Units 11/05/20  1615   WBC 10*3/mm3 8.34   HEMOGLOBIN g/dL 12.4   HEMATOCRIT % 39.5   PLATELETS 10*3/mm3 304       CMP  Results from last 7 days   Lab Units 11/05/20  1615   SODIUM mmol/L 142   POTASSIUM mmol/L 3.8   CHLORIDE mmol/L 104   CO2 mmol/L 26.0   BUN mg/dL 19   CREATININE mg/dL 0.97   CALCIUM mg/dL 10.0   BILIRUBIN mg/dL <0.2   ALK PHOS U/L 73   ALT (SGPT) U/L 7   AST (SGOT) U/L 13   GLUCOSE mg/dL 107*       Radiology  Imaging Results (Last 72 Hours)     ** No results found for the last 72 hours. **              Results Review: I have personally reviewed all of the recent lab and imaging results available at this time.     Assessment:  Mrs. Tracy is a 45 year old lady with history of POTS, fibromyalgia, Sjogren's disease, and autoimmune disease on methotrexate with biliary dyskinesia. Labs and vitals without acute findings. She has previously undergone a RUQ US and HIDA which I have personally reviewed that demonstrated no stones but with reduced ejection fraction of 9% and reproduction of symptoms. I saw her in the office yesterday for this and had scheduled her for elective cholecystectomy, however she  presented to the ED from my clinic yesterday without discussing this with me or my staff with reported worsened pain. I have recommended cholecystectomy to her and will add on to the OR schedule today    Plan:  -Continue NPO status  -to OR for laparoscopic cholecystectomy today      I had a long discussion with the patient and their family regarding the risks, benefits, and alternatives to the procedure including specifically discussing the risks of bleeding, infection, damage to adjacent structures, possible need for further operations, possible need for conversion to an open operation, risks of common bile duct injury and risk of bile leak after the procedure. They wish to proceed.           Jonah Diop MD  11/06/20  08:36 EST

## 2020-11-06 NOTE — PLAN OF CARE
Goal Outcome Evaluation:      Patient expressed intermittent upper abdominal pain throughout the night, relieved with PRN dilaudid, and occasional nausea, relieved with PRN zofran.  No vomiting this evening.  NPO since midnight. Normal saline infusing at 75 ml/hr. General surgery consult to be called in AM with Dr. Diop for anticipated cholecystectomy.

## 2020-11-06 NOTE — CONSULTS
Clinical Nutrition   Reason For Visit: Identified at risk by screening criteria, MST score 2+, Reduced oral intake    Patient Name: Silvia Tracy  YOB: 1975  MRN: [id number]  Date of Encounter: 11/06/20 11:42 EST  Admission date: 11/5/2020      Nutrition Assessment     Admission Problem List:  Nausea  Abdominal pain/distension/bloating  Constipation/diarrhea  Fatigue  Biliary dyskinesia      Applicable PMH:  POTS  Fibromyalgia  Sjogren's  C. Diff s/p fecal transplant   Migraines  Anxiety  Constipation/diarrhea      Applicable medical tests/procedures since admission:  (11/6) plan for CCY       Reported/Observed/Food/Nutrition Related History   Patient reports she has been eating 33% of her usual PO intake during the past 4-5 weeks with resulting unintentional weight loss of ~10 lbs. Reports UBW ~165 lbs and weighed 155 lbs on standing scale at home yesterday. Last solid food consumed was yesterday around noon. Denies food allergies and difficulty chewing/swallowing.    Anthropometrics   Height: 66 in  Weight: 164 lbs (bed scale weight 11/5 per nsg doc)  BMI: 26.5  BMI classification: Overweight: 25.0-29.9kg/m2    IBW: 130 lbs    UBW: ~165 lbs (per pt); 163 lbs (10/28/20 MDOV), 153 lbs (8/11/20 MDOV)  Weight change: RD will assess recent weight changes after a current standing wt is obtained.    Labs reviewed   Labs reviewed: Yes    Medications reviewed   Medications reviewed: Yes  GTT: NS @ 75 ml/hr  PRN: zofran    Current Nutrition Prescription   PO: NPO Diet    Evaluation of Received Nutrient/Fluid Intake: insufficient data    Nutrition Diagnosis     Problem Inadequate oral intake   Etiology Nausea, abdominal pain/distension, constipation/diarrhea, poor appetite   Signs/Symptoms Patient reports 33% of usual PO intake x 4-5 weeks prior to admission     Intervention   Intervention: Follow treatment progress, Care plan reviewed, Await begin PO     -Ordered standing weight.    Goal:    General: Nutrition support treatment  PO: Initiate diet as medically appropriate    Monitoring/Evaluation:   Monitoring/Evaluation: Per protocol, I&O, Pertinent labs, Weight, GI status, Symptoms    Annabelle Juan RD  Time Spent: 45 min

## 2020-11-06 NOTE — ED PROVIDER NOTES
Subjective   45-year-old female presents to the emergency department with complaints of continuous upper abdominal pain and nausea for the past month.  The patient states that she underwent recent HIDA scan last week with an EF of 9%.  She had a normal gallbladder ultrasound.  The patient saw Dr. Diop and is scheduled for cholecystectomy in December.  The patient states that she cannot bear the pain any longer.  She states that she is unable to eat or drink and feels that she is getting dehydrated.  She has had intense nausea without vomiting.  No fever or chills.  The patient has a history of Sjogren's, POTS, and cervical vasculitis.  She has had previous C-sections x2.  She is a non-smoker.  No alcohol or drug use.  Her PCP is Soheila Booker.  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          Review of Systems   Constitutional: Positive for appetite change. Negative for chills and fever.   HENT: Negative for sore throat.    Respiratory: Negative for cough and shortness of breath.    Cardiovascular: Negative for chest pain.   Gastrointestinal: Positive for abdominal pain and nausea. Negative for blood in stool, constipation, diarrhea and vomiting.   Endocrine: Negative for polydipsia, polyphagia and polyuria.   Genitourinary: Positive for decreased urine volume. Negative for dysuria.   Musculoskeletal: Positive for back pain.   Skin: Negative for rash.   Allergic/Immunologic: Positive for immunocompromised state.   Neurological: Negative for dizziness, light-headedness and headaches.   Hematological: Negative.    Psychiatric/Behavioral: Negative.        Past Medical History:   Diagnosis Date   • Acute congestive heart failure (CMS/HCC)    • DWIGHT positive    • Anemia    • Anxiety    • Fibromyalgia    • History of Clostridium difficile     stool transplant 08/16   • History of transfusion    • Migraine    • Numbness and tingling of left leg     • POTS (postural orthostatic tachycardia syndrome)    • Sjogren's disease (CMS/HCC)        Allergies   Allergen Reactions   • Iron Arrhythmia     IV ONLY       Past Surgical History:   Procedure Laterality Date   •  SECTION     • TEMPOROMANDIBULAR JOINT SURGERY     • TUBAL ABDOMINAL LIGATION         Family History   Problem Relation Age of Onset   • Scleroderma Mother    • Coronary artery disease Father    • COPD Father        Social History     Socioeconomic History   • Marital status:      Spouse name: Not on file   • Number of children: Not on file   • Years of education: Not on file   • Highest education level: Not on file   Tobacco Use   • Smoking status: Never Smoker   • Smokeless tobacco: Never Used   Substance and Sexual Activity   • Alcohol use: Yes     Alcohol/week: 1.0 standard drinks     Types: 1 Glasses of wine per week   • Drug use: No   • Sexual activity: Defer           Objective   Physical Exam  Constitutional:       Appearance: Normal appearance.      Comments: Appears uncomfortable   HENT:      Head: Normocephalic.      Nose: Nose normal.      Mouth/Throat:      Mouth: Mucous membranes are moist.   Eyes:      General: No scleral icterus.     Conjunctiva/sclera: Conjunctivae normal.      Pupils: Pupils are equal, round, and reactive to light.   Neck:      Musculoskeletal: Normal range of motion.   Cardiovascular:      Rate and Rhythm: Normal rate and regular rhythm.      Pulses: Normal pulses.      Heart sounds: Murmur present.   Pulmonary:      Effort: Pulmonary effort is normal.      Breath sounds: Normal breath sounds.   Abdominal:      General: Bowel sounds are normal.      Tenderness: There is no rebound.      Comments: Marked epigastric to right upper quadrant tenderness.   Musculoskeletal: Normal range of motion.   Skin:     General: Skin is warm and dry.      Findings: No rash.   Neurological:      General: No focal deficit present.      Mental Status: She is alert and  oriented to person, place, and time.   Psychiatric:         Mood and Affect: Mood normal.         Procedures           ED Course        The patient is very uncomfortable.  She states that she has not been able to eat or drink.  Labs are bland with a white count of 8.34, normal H&H at 12/39 and normal chemistries.  I reviewed her prior records and I see that she had a HIDA scan with an EF of 9% last week.    I spoke with general surgery (Dr. Holden).  She advised to admit the patient overnight and get a rapid Covid test and consult Dr. Spain in the morning.                                     [facility]    Final diagnoses:   Intractable upper abdominal pain   Biliary colic   Biliary dyskinesia            Niko Evangelista, PA  11/05/20 1932

## 2020-11-06 NOTE — ANESTHESIA PREPROCEDURE EVALUATION
Anesthesia Evaluation                  Airway   Mallampati: I  TM distance: >3 FB  Neck ROM: full  No difficulty expected  Dental      Pulmonary    Cardiovascular     ECG reviewed        Neuro/Psych  (+) headaches, psychiatric history Anxiety and Depression,     GI/Hepatic/Renal/Endo      Musculoskeletal     Abdominal    Substance History      OB/GYN          Other                        Anesthesia Plan    ASA 2     general     intravenous induction     Anesthetic plan, all risks, benefits, and alternatives have been provided, discussed and informed consent has been obtained with: patient.    Plan discussed with CRNA.

## 2020-11-06 NOTE — PROGRESS NOTES
Discharge Planning Assessment  [facility]     Patient Name: Silvia Tracy  MRN: [id number]  Today's Date: 11/6/2020    Admit Date: 11/5/2020    Discharge Needs Assessment     Row Name 11/06/20 1501       Living Environment    Lives With  spouse    Current Living Arrangements  home/apartment/condo       Discharge Needs Assessment    Equipment Currently Used at Home  none    Equipment Needed After Discharge  none    Discharge Coordination/Progress  Has a walker.        Discharge Plan     Row Name 11/06/20 1502       Plan    Plan  Home at DC    Patient/Family in Agreement with Plan  other (see comments)    Plan Comments  The pt is in OR. Info from chart. No DC needs identified at this time.    Final Discharge Disposition Code  01 - home or self-care        Continued Care and Services - Admitted Since 11/5/2020    Coordination has not been started for this encounter.       Expected Discharge Date and Time     Expected Discharge Date Expected Discharge Time    Nov 7, 2020         Demographic Summary    No documentation.       Functional Status     Row Name 11/06/20 1501       Functional Status    Usual Activity Tolerance  good       Functional Status, IADL    Medications  independent    Meal Preparation  independent    Housekeeping  independent    Laundry  independent    Shopping  independent        Psychosocial    No documentation.       Abuse/Neglect    No documentation.       Legal    No documentation.       Substance Abuse    No documentation.       Patient Forms    No documentation.           Eve Crespo RN

## 2020-11-06 NOTE — PROGRESS NOTES
[facility] Medicine Services  PROGRESS NOTE    Patient Name: Silvia Tracy  : 1975  MRN: [id number]    Date of Admission: 2020  Primary Care Physician: Soheila Gomez MD    Subjective   Subjective     CC: Follow-up abdominal pain    HPI: Patient continues to have diffuse abdominal pain, also complains of migraines    Review of Systems  Gen- No fevers, chills  CV- No chest pain, palpitations  Resp- No cough, dyspnea  GI- No N/V/D, +abd pain     All other systems reviewed and are negative    Objective   Objective     Vital Signs:   Temp:  [97.3 °F (36.3 °C)-98.2 °F (36.8 °C)] 97.6 °F (36.4 °C)  Heart Rate:  [84-99] 86  Resp:  [15-18] 18  BP: (100-125)/(66-81) 102/68        Physical Exam:  Constitutional: No acute distress, awake, alert  HENT: NCAT, mucous membranes moist  Respiratory: Clear to auscultation bilaterally, respiratory effort normal   Cardiovascular: RRR, no murmurs, rubs, or gallops, palpable pedal pulses bilaterally  Gastrointestinal: Faint bowel sounds, soft moderate tender to palpation nondistended  Musculoskeletal: No bilateral ankle edema  Psychiatric: Appropriate affect, cooperative  Neurologic: Oriented x 3, nonfocal  Skin: No rashes    Results Reviewed:  Results from last 7 days   Lab Units 20  1615   WBC 10*3/mm3 8.34   HEMOGLOBIN g/dL 12.4   HEMATOCRIT % 39.5   PLATELETS 10*3/mm3 304     Results from last 7 days   Lab Units 20  1615   SODIUM mmol/L 142   POTASSIUM mmol/L 3.8   CHLORIDE mmol/L 104   CO2 mmol/L 26.0   BUN mg/dL 19   CREATININE mg/dL 0.97   GLUCOSE mg/dL 107*   CALCIUM mg/dL 10.0   ALT (SGPT) U/L 7   AST (SGOT) U/L 13     Estimated Creatinine Clearance: 74 mL/min (by C-G formula based on SCr of 0.97 mg/dL).    Microbiology Results Abnormal     Procedure Component Value - Date/Time    COVID PRE-OP / PRE-PROCEDURE SCREENING ORDER (NO ISOLATION) - Swab, Nasal Cavity [[id number]]  (Normal) Collected: 20 2302    Lab  Status: Final result Specimen: Swab from Nasal Cavity Updated: 11/05/20 2333    Narrative:      The following orders were created for panel order COVID PRE-OP / PRE-PROCEDURE SCREENING ORDER (NO ISOLATION) - Swab, Nasal Cavity.  Procedure                               Abnormality         Status                     ---------                               -----------         ------                     COVID-19, ABBOTT IN-HOUS...[[id number]]  Normal              Final result                 Please view results for these tests on the individual orders.    COVID-19, ABBOTT IN-HOUSE,NP Swab (NO TRANSPORT MEDIA) 2 HR TAT - Swab, Nasal Cavity [[id number]]  (Normal) Collected: 11/05/20 2302    Lab Status: Final result Specimen: Swab from Nasal Cavity Updated: 11/05/20 2333     COVID19 Not Detected    Narrative:      Fact sheet for providers: https://www.fda.gov/media/266180/download     Fact sheet for patients: https://www.fda.gov/media/735001/download          Imaging Results (Last 24 Hours)     ** No results found for the last 24 hours. **               I have reviewed the medications:  Scheduled Meds:sodium chloride, 10 mL, Intravenous, Q12H      Continuous Infusions:sodium chloride, 75 mL/hr, Last Rate: 75 mL/hr (11/06/20 0654)      PRN Meds:.•  HYDROmorphone  •  ondansetron **OR** ondansetron  •  Sodium Chloride (PF)  •  sodium chloride    Assessment/Plan   Assessment & Plan     Active Hospital Problems    Diagnosis  POA   • **Intractable upper abdominal pain [R10.10]  Yes   • Dysfunctional gallbladder [K82.8]  Yes   • Anxiety and depression [F41.9, F32.9]  Yes      Resolved Hospital Problems   No resolved problems to display.        Brief Hospital Course to date:  Silvia Tracy is a 45 y.o. female past medical history of fibromyalgia, anxiety,  POTS, Sjogren's, remote C. difficile colitis s/p fecal transplant.  Patient presents with intermittent RUQ abdominal pain, nausea, abdominal distention, bloating  intermittent constipation/diarrhea    Intractable RUQ abdominal pain secondary to biliary dyskinesia  - HIDA scan done 10/30 showed abnormally low GB EF 9%, was scheduled for CCY in December  -General surgery has been consulted plan for OR today, currently n.p.o.   -Continue pain control, antiemetics, IV fluids  -Preprocedure Covid test is negative    Anxiety and depression-continue Cymbalta    Fibromyalgia-continue amitriptyline    Sjogren's syndrome-continue Plaquenil    DVT Prophylaxis: Mechanical    Disposition: TBD    CODE STATUS:   Code Status and Medical Interventions:   Ordered at: 11/05/20 2155     Code Status:    CPR     Medical Interventions (Level of Support Prior to Arrest):    Full       Alexis Ho MD  11/06/20

## 2020-11-06 NOTE — H&P
"    [facility] Medicine Services  Clinical Decision Unit (CDU)  HISTORY & PHYSICAL    Patient Name: Silvia Tracy  : 1975  MRN: [id number]  Primary Care Physician: Soheila Gomez MD  Date of admission: 2020  6:12 PM      Subjective   Subjective     Chief Complaint:  Abdominal pain     HPI:  Silvia Tracy is a 45 y.o. female w/ a hx of Fibromyalgia, POTS, Sjogren's, migraines, remote C.Diff colitis s/p fecal transplant and anxiety who presented to the ED w/ c/o abdominal pain.  Pt c/o intermittent RUQ pain w/ nausea, abdominal distention and bloating, constipation and diarrhea and fatigue x 4-5 weeks. Pain and nausea associated w/ meals. Pt reports chronic issues w/ diarrhea and constipation, but reports that both have intermittently been worse recently. Pt w/ \"low grade fevers\" last week; no known fevers in last 48 hrs.   Pt underwent a RUQ US (ordered by PCP) on 10/13 that was \"normal\". HIDA scan done on 10/30 that revealed an \"abnormally low GB EF of 9%\". Pt referred and seen by General Surgery and was scheduled for a choley in December. Pt reports that her pain and symptoms became \"unbearable\" over the past 1-2 days prompting her ED visit.   Pt denies fever/chills, chest pain, dyspnea, cough, vomiting, melena, dysuria, syncope, confusion. No known sick contacts.  Pt evaluated in the ED then admitted to the hospital medicine service for further evaluation.       Review of Systems   Constitutional: Positive for chills and fatigue. Negative for fever.   HENT: Negative.  Negative for congestion, sinus pressure, sinus pain, sneezing, sore throat and trouble swallowing.    Eyes: Negative.  Negative for visual disturbance.   Respiratory: Negative.  Negative for cough, shortness of breath, wheezing and stridor.    Cardiovascular: Negative.  Negative for chest pain, palpitations and leg swelling.   Gastrointestinal: Positive for abdominal distention, abdominal " pain, constipation, diarrhea and nausea. Negative for blood in stool and vomiting.   Endocrine: Negative.    Genitourinary: Negative.  Negative for decreased urine volume, difficulty urinating, dysuria, flank pain, frequency, hematuria, pelvic pain and urgency.   Musculoskeletal: Negative.  Negative for arthralgias, myalgias, neck pain and neck stiffness.   Skin: Negative.  Negative for wound.   Allergic/Immunologic: Positive for immunocompromised state.   Neurological: Negative.  Negative for dizziness, tremors, seizures, syncope, facial asymmetry, speech difficulty, weakness, light-headedness, numbness and headaches.   Hematological: Negative.  Does not bruise/bleed easily.   Psychiatric/Behavioral: Negative.  Negative for confusion.      All other systems reviewed and negative    Personal History     Past Medical History:   Diagnosis Date   • Acute congestive heart failure (CMS/HCC)    • DWIGHT positive    • Anemia    • Anxiety    • Fibromyalgia    • History of Clostridium difficile     stool transplant    • History of transfusion    • History of vertebral fracture (2016) 10/27/2016   • Migraine    • Migraine without aura and without status migrainosus, not intractable 10/28/2016   • Numbness and tingling of both feet 2018   • Numbness and tingling of left leg    • POTS (postural orthostatic tachycardia syndrome)    • Sjogren's disease (CMS/HCC)        Past Surgical History:   Procedure Laterality Date   •  SECTION     • TEMPOROMANDIBULAR JOINT SURGERY     • TUBAL ABDOMINAL LIGATION         Family History: family history includes COPD in her father; Coronary artery disease in her father; Scleroderma in her mother. Otherwise pertinent FHx was reviewed and unremarkable.     Social History:  reports that she has never smoked. She has never used smokeless tobacco. She reports current alcohol use. She reports that she does not use drugs.  Social History     Social History Narrative   • Not on file            Medications:  Available home medication information reviewed.  Medications Prior to Admission   Medication Sig Dispense Refill Last Dose   • vitamin B-12 (CYANOCOBALAMIN) 500 MCG tablet Take 500 mcg by mouth Daily.      • acetaminophen (TYLENOL) 500 MG tablet Take 1,000 mg by mouth Every 6 (Six) Hours As Needed for moderate pain (4-6).      • amitriptyline (ELAVIL) 10 MG tablet Take 1 tablet by mouth Every Night. 30 tablet 2    • bisoprolol (ZEBeta) 5 MG tablet Take 5 mg by mouth Daily As Needed.      • dexlansoprazole (Dexilant) 60 MG capsule Take 60 mg by mouth Daily.      • Dietary Management Product (VASCULERA PO) Take  by mouth.      • DULoxetine (CYMBALTA) 30 MG capsule Take 90 mg by mouth Daily.      • FOLIC ACID-CHOLECALCIFEROL PO Take 1 tablet by mouth Daily.      • hydroxychloroquine (PLAQUENIL) 200 MG tablet Take 300 mg by mouth Daily.      • ivabradine HCl (Corlanor) 5 MG tablet tablet Take 5 mg by mouth Daily.      • Lifitegrast (Xiidra) 5 % ophthalmic solution Administer 1 drop to both eyes 2 (Two) Times a Day.      • melatonin 5 MG tablet tablet Take 10 mg by mouth Every Night.      • methotrexate 2.5 MG tablet Take 2.5 mg by mouth 1 (One) Time Per Week. 6 TABLETS PER WEEK, 15mg once a week on fridays      • midodrine (PROAMATINE) 5 MG tablet Take 30 mg by mouth Daily.      • Multiple Vitamins-Minerals (MULTIVITAMIN ADULT PO) Take 1 dose by mouth Daily.      • Norethin-Eth Estrad-Fe Biphas (LO LOESTRIN FE PO) Take  by mouth.      • Topiramate ER (Trokendi XR) 200 MG capsule sustained-release 24 hr Take  by mouth Daily.      • vitamin D (ERGOCALCIFEROL) 30557 UNITS capsule capsule Take 5,000 Units by mouth Daily.          Allergies   Allergen Reactions   • Iron Arrhythmia     IV ONLY       Objective   Objective     Vital Signs:   Temp:  [97.3 °F (36.3 °C)-98.2 °F (36.8 °C)] 98.2 °F (36.8 °C)  Heart Rate:  [85-99] 88  Resp:  [16-18] 18  BP: (106-125)/(71-81) 125/81        Physical Exam  "    Constitutional: Awake, alert; non-toxic appearing   Eyes: PERRLA, sclerae anicteric, no conjunctival injection  HENT: NCAT, mucous membranes moist  Neck: Supple, no thyromegaly, no lymphadenopathy, trachea midline  Respiratory: Clear to auscultation bilaterally, nonlabored respirations   Cardiovascular: RRR, no murmurs, rubs, or gallops, no peripheral edema   Gastrointestinal: Positive bowel sounds, soft, non-distended; RUQ- TTP   Musculoskeletal: ROM normal bilaterally   Psychiatric: Appropriate affect, cooperative  Neurologic: Oriented x 3, strength symmetric in all extremities, Cranial Nerves grossly intact to confrontation, speech clear  Skin: No rashes, lesions or wounds     Results Reviewed:    Glucose 107; CMP otherwise WNL   Lipase 48  WBC 8.34, H/H 12.4/39.5, platelet 3.4    Abdominal US 10/13/2020: Unremarkable right upper quadrant ultrasound.    HIDA scan 10/30/2020: Abnormally low gallbladder ejection fraction of only 9% and  reproduction of the patient's usual symptoms of nausea, consistent with  chronic gallbladder dysfunction.      Assessment/Plan   Assessment / Plan     Active Hospital Problems    Diagnosis POA   • **Intractable upper abdominal pain [R10.10] Yes   • Dysfunctional gallbladder [K82.8] Yes   • Anxiety and depression [F41.9, F32.9] Yes     Silvia Tracy is a 45 y.o. female w/ a hx of Fibromyalgia, POTS, Sjogren's, migraines, remote C.Diff colitis s/p fecal transplant and anxiety who presented to the ED w/ c/o abdominal pain. Recent HIDA scan c/w gallbladder dysfunction. Pt admitted to the hospital medicine service for further evaluation.     Assessment & Plan    **Dysfunctional gallbladder  **Intractable RUQ pain  -RUQ US 10/13/2020- \"normal\"  -HIDA scan 10/30/2020- \"Abnormally low gallbladder ejection fraction of only 9%\"  -seen by Surgery outpt and scheduled for choley in December, but pt unable to tolerate symptoms  -s/p 2 liters NS bolus in ED; continue NS @ " 75ml/hour  -tolerated Dilaudid and Zofran in ED; continue overnight for symptom mgt  -NPO after Midnight  -pre-op EKG, type/screen, CBC, BMP and Mag ordered for this am  -pre-procedure COVID test ordered/pending  -General Surgery consult in am; Dr. Diop       CODE STATUS:    Code Status and Medical Interventions:   Ordered at: 11/05/20 2155     Code Status:    CPR     Medical Interventions (Level of Support Prior to Arrest):    Full       Admission Status:   I believe this patient meets OBSERVATION status, however if further evaluation or treatment plans warrant, status may change.  Based upon current information, I predict patient's care encounter to be less than or equal to 2 midnights.      Discharge Blueprint (criteria for discharge):   1. Cleared for discharge by General Surgery.  2. Tolerating oral intake.    Nancy Preciado, APRN  11/05/20

## 2020-11-07 VITALS
HEIGHT: 67 IN | OXYGEN SATURATION: 100 % | TEMPERATURE: 97.8 F | HEART RATE: 97 BPM | DIASTOLIC BLOOD PRESSURE: 68 MMHG | RESPIRATION RATE: 16 BRPM | WEIGHT: 164 LBS | BODY MASS INDEX: 25.74 KG/M2 | SYSTOLIC BLOOD PRESSURE: 109 MMHG

## 2020-11-07 PROBLEM — R10.10 INTRACTABLE UPPER ABDOMINAL PAIN: Status: RESOLVED | Noted: 2020-11-05 | Resolved: 2020-11-07

## 2020-11-07 LAB
ALBUMIN SERPL-MCNC: 4.1 G/DL (ref 3.5–5.2)
ALBUMIN/GLOB SERPL: 1.8 G/DL
ALP SERPL-CCNC: 62 U/L (ref 39–117)
ALT SERPL W P-5'-P-CCNC: 16 U/L (ref 1–33)
ANION GAP SERPL CALCULATED.3IONS-SCNC: 11 MMOL/L (ref 5–15)
AST SERPL-CCNC: 24 U/L (ref 1–32)
BILIRUB SERPL-MCNC: 0.2 MG/DL (ref 0–1.2)
BUN SERPL-MCNC: 7 MG/DL (ref 6–20)
BUN/CREAT SERPL: 9.1 (ref 7–25)
CALCIUM SPEC-SCNC: 9.3 MG/DL (ref 8.6–10.5)
CHLORIDE SERPL-SCNC: 103 MMOL/L (ref 98–107)
CO2 SERPL-SCNC: 23 MMOL/L (ref 22–29)
CREAT SERPL-MCNC: 0.77 MG/DL (ref 0.57–1)
DEPRECATED RDW RBC AUTO: 51.2 FL (ref 37–54)
ERYTHROCYTE [DISTWIDTH] IN BLOOD BY AUTOMATED COUNT: 14.2 % (ref 12.3–15.4)
GFR SERPL CREATININE-BSD FRML MDRD: 80 ML/MIN/1.73
GLOBULIN UR ELPH-MCNC: 2.3 GM/DL
GLUCOSE SERPL-MCNC: 97 MG/DL (ref 65–99)
HCT VFR BLD AUTO: 36.6 % (ref 34–46.6)
HGB BLD-MCNC: 11.4 G/DL (ref 12–15.9)
MCH RBC QN AUTO: 31.2 PG (ref 26.6–33)
MCHC RBC AUTO-ENTMCNC: 31.1 G/DL (ref 31.5–35.7)
MCV RBC AUTO: 100.3 FL (ref 79–97)
PLATELET # BLD AUTO: 215 10*3/MM3 (ref 140–450)
PMV BLD AUTO: 9.8 FL (ref 6–12)
POTASSIUM SERPL-SCNC: 5 MMOL/L (ref 3.5–5.2)
PROT SERPL-MCNC: 6.4 G/DL (ref 6–8.5)
RBC # BLD AUTO: 3.65 10*6/MM3 (ref 3.77–5.28)
SODIUM SERPL-SCNC: 137 MMOL/L (ref 136–145)
WBC # BLD AUTO: 11.38 10*3/MM3 (ref 3.4–10.8)

## 2020-11-07 PROCEDURE — G0378 HOSPITAL OBSERVATION PER HR: HCPCS

## 2020-11-07 PROCEDURE — 99217 PR OBSERVATION CARE DISCHARGE MANAGEMENT: CPT | Performed by: INTERNAL MEDICINE

## 2020-11-07 PROCEDURE — 80053 COMPREHEN METABOLIC PANEL: CPT | Performed by: SURGERY

## 2020-11-07 PROCEDURE — 85027 COMPLETE CBC AUTOMATED: CPT | Performed by: SURGERY

## 2020-11-07 RX ADMIN — HYDROXYCHLOROQUINE SULFATE 300 MG: 200 TABLET, FILM COATED ORAL at 08:57

## 2020-11-07 RX ADMIN — SODIUM CHLORIDE 75 ML/HR: 9 INJECTION, SOLUTION INTRAVENOUS at 06:30

## 2020-11-07 RX ADMIN — OXYCODONE HYDROCHLORIDE AND ACETAMINOPHEN 2 TABLET: 5; 325 TABLET ORAL at 03:36

## 2020-11-07 RX ADMIN — OXYCODONE HYDROCHLORIDE AND ACETAMINOPHEN 2 TABLET: 5; 325 TABLET ORAL at 08:56

## 2020-11-07 RX ADMIN — DULOXETINE HYDROCHLORIDE 90 MG: 60 CAPSULE, DELAYED RELEASE ORAL at 08:57

## 2020-11-07 NOTE — PLAN OF CARE
Goal Outcome Evaluation:  Plan of Care Reviewed With: patient     Outcome Summary: Pt arrived from PACU, Pt has c/o abd pain. PRN meds given and pt was able to rest through the night. NSR per monitor. 5 Lap sites, clean dry, and intact. VSS on RA. Will continue to monitor.

## 2020-11-07 NOTE — PROGRESS NOTES
"Patient Name:  Silvia Tracy  YOB: 1975  [id number]    Surgery Progress Note    Date of visit: 11/7/2020      Subjective: No acute events overnight. Some incisional pain which is controlled. No emesis or fevers. Has not been out of bed yet           Objective:     /68 (BP Location: Left arm, Patient Position: Lying)   Pulse 97   Temp 97.8 °F (36.6 °C) (Oral)   Resp 16   Ht 170.2 cm (67\")   Wt 74.4 kg (164 lb)   LMP  (LMP Unknown)   SpO2 100%   BMI 25.69 kg/m²     Intake/Output Summary (Last 24 hours) at 11/7/2020 0840  Last data filed at 11/7/2020 0630  Gross per 24 hour   Intake 4353 ml   Output 4200 ml   Net 153 ml       GEN:   Awake, alert, in no acute distress, resting comfortably in bed   CV:   Regular rate and rhythm  L:  Clear to auscultation bilaterally, not labored on room air   Abd:  Soft, appropriately tender along incisions, incisions clean and dry   Ext:  No cyanosis, clubbing, or edema    Recent labs that are back at this time have been reviewed.           Assessment/ Plan:    Mrs. Tracy is a 45 year old lady with history of POTS, fibromyalgia, Sjogren's disease, and autoimmune disease on methotrexate with biliary dyskinesia.     #Biliary dyskinesia   -POD#1 after lap don  -Labs appropriate  -Ok for regular diet  -Ok for discharge from my standpoint  -FU in 2 weeks         Jonah Diop MD  11/7/2020  08:40 EST    "

## 2020-11-07 NOTE — DISCHARGE INSTRUCTIONS
Regular diet and activity as tolerated.  Please remind patient no lifting greater than 10 lbs for 2 weeks.   Please remind patient to continue with daily stool softener.    Ok to shower in 24 hrs. Let warm soapy water run over wounds. Pat dry do not scrub. Do not soak in tub bathe for 2 weeks.   Please instruction patient to call the [facility] [phone] if fever greater than 101.5, increased redness or drainage from wound sites, or worsening nausea or vomiting.

## 2020-11-07 NOTE — ANESTHESIA POSTPROCEDURE EVALUATION
Patient: Silvia Tracy    Procedure Summary     Date: 11/06/20 Room / Location:  [facility] /  [facility]    Anesthesia Start: 1913 Anesthesia Stop: 2027    Procedure: CHOLECYSTECTOMY LAPAROSCOPIC (N/A Abdomen) Diagnosis:     Surgeon: Jonah Diop MD Provider: Almita Teixeira DO    Anesthesia Type: general ASA Status: 2          Anesthesia Type: general    Vitals  Vitals Value Taken Time   /78 11/06/20 2025   Temp 97 °F (36.1 °C) 11/06/20 2024   Pulse 97 11/06/20 2024   Resp 14 11/06/20 2024   SpO2 98 % 11/06/20 2027   Vitals shown include unvalidated device data.        Post Anesthesia Care and Evaluation    Patient location during evaluation: PACU  Patient participation: complete - patient participated  Level of consciousness: awake and alert  Pain score: 0  Pain management: adequate  Airway patency: patent  Anesthetic complications: No anesthetic complications  PONV Status: none  Cardiovascular status: hemodynamically stable and acceptable  Respiratory status: nonlabored ventilation, acceptable and nasal cannula  Hydration status: acceptable

## 2020-11-07 NOTE — DISCHARGE SUMMARY
[facility] Medicine Services  DISCHARGE SUMMARY    Patient Name: Silvia Tracy  : 1975  MRN: [id number]    Date of Admission: 2020  6:12 PM  Date of Discharge: 2020  Primary Care Physician: Soheila Gomez MD    Consults     Date and Time Order Name Status Description    2020 0032 Inpatient General Surgery Consult Completed           Hospital Course     Presenting Problem:   Intractable upper abdominal pain [R10.10]  Intractable upper abdominal pain [R10.10]    Active Hospital Problems    Diagnosis  POA   • **Intractable upper abdominal pain [R10.10]  Yes   • Anxiety and depression [F41.9, F32.9]  Yes      Resolved Hospital Problems    Diagnosis Date Resolved POA   • Dysfunctional gallbladder [K82.8] 2020 Yes      Hospital Course:  Silvia Tracy is a 45 y.o. female past medical history of fibromyalgia, anxiety,  POTS, Sjogren's, remote C. difficile colitis s/p fecal transplant.  Patient presents with intermittent RUQ abdominal pain, nausea, abdominal distention, bloating intermittent constipation/diarrhea, patient admitted with biliary dyskinesia.     Intractable RUQ abdominal pain secondary to biliary dyskinesia  - HIDA scan done 10/30 showed abnormally low GB EF 9%,   - General surgery following, she is s/p CC Y 2020, okay to have regular diet, continue to encourage ambulation, okay for discharge home per general surgery, she will follow up with them in 2 weeks.     Anxiety and depression-continue Cymbalta     Fibromyalgia-continue amitriptyline     Sjogren's syndrome-continue Plaquenil    Discharge Follow Up Recommendations for outpatient labs/diagnostics:  Follow-up with general surgery in 2 weeks    Day of Discharge     HPI: No acute events overnight, patient does have some mild abdominal pain, no fevers no nausea.    Review of Systems  Gen- No fevers, chills  CV- No chest pain, palpitations  Resp- No cough, dyspnea  GI- No  N/V/D, abd pain      Vital Signs:   Temp:  [97 °F (36.1 °C)-98.7 °F (37.1 °C)] 97.8 °F (36.6 °C)  Heart Rate:  [] 97  Resp:  [14-20] 16  BP: (106-120)/(68-79) 109/68     Physical Exam:  Constitutional: No acute distress, awake, alert  HENT: NCAT, mucous membranes moist  Respiratory: Clear to auscultation bilaterally, respiratory effort normal   Cardiovascular: RRR, no murmurs, rubs, or gallops, palpable pedal pulses bilaterally  Gastrointestinal: Positive bowel sounds, soft, nontender, nondistended, incision CDI  Musculoskeletal: No bilateral ankle edema  Psychiatric: Appropriate affect, cooperative  Neurologic: Oriented x 3, nonfocal  Skin: No rashes    Pertinent  and/or Most Recent Results     Results from last 7 days   Lab Units 11/07/20  0751 11/07/20  0333 11/06/20  0820 11/05/20  1615   WBC 10*3/mm3  --  11.38* 5.80 8.34   HEMOGLOBIN g/dL  --  11.4* 10.2* 12.4   HEMATOCRIT %  --  36.6 33.2* 39.5   PLATELETS 10*3/mm3  --  215 207 304   SODIUM mmol/L 137  --  143 142   POTASSIUM mmol/L 5.0  --  3.7 3.8   CHLORIDE mmol/L 103  --  111* 104   CO2 mmol/L 23.0  --  24.0 26.0   BUN mg/dL 7  --  15 19   CREATININE mg/dL 0.77  --  0.86 0.97   GLUCOSE mg/dL 97  --  79 107*   CALCIUM mg/dL 9.3  --  8.6 10.0     Results from last 7 days   Lab Units 11/07/20  0751 11/06/20  0820 11/05/20  1615   BILIRUBIN mg/dL 0.2  --  <0.2   ALK PHOS U/L 62  --  73   ALT (SGPT) U/L 16  --  7   AST (SGOT) U/L 24  --  13   PROTIME Seconds  --  13.4  --    INR   --  1.05  --    APTT seconds  --  26.5  --            Invalid input(s): TG, LDLCALC, LDLREALC        Brief Urine Lab Results  (Last result in the past 365 days)      Color   Clarity   Blood   Leuk Est   Nitrite   Protein   CREAT   Urine HCG        11/06/20 1508               Negative           Microbiology Results Abnormal     Procedure Component Value - Date/Time    COVID PRE-OP / PRE-PROCEDURE SCREENING ORDER (NO ISOLATION) - Swab, Nasal Cavity [[id number]]  (Normal)  Collected: 11/05/20 2302    Lab Status: Final result Specimen: Swab from Nasal Cavity Updated: 11/05/20 2333    Narrative:      The following orders were created for panel order COVID PRE-OP / PRE-PROCEDURE SCREENING ORDER (NO ISOLATION) - Swab, Nasal Cavity.  Procedure                               Abnormality         Status                     ---------                               -----------         ------                     COVID-19, ABBOTT IN-HOUS...[[id number]]  Normal              Final result                 Please view results for these tests on the individual orders.    COVID-19, ABBOTT IN-HOUSE,NP Swab (NO TRANSPORT MEDIA) 2 HR TAT - Swab, Nasal Cavity [[id number]]  (Normal) Collected: 11/05/20 2302    Lab Status: Final result Specimen: Swab from Nasal Cavity Updated: 11/05/20 2333     COVID19 Not Detected    Narrative:      Fact sheet for providers: https://www.fda.gov/media/245504/download     Fact sheet for patients: https://www.fda.gov/media/858252/download          Imaging Results (All)     None      Plan for Follow-up of Pending Labs/Results: General surgery  Pending Labs     Order Current Status    Tissue Pathology Exam In process        Discharge Details        Discharge Medications      ASK your doctor about these medications      Instructions Start Date   acetaminophen 500 MG tablet  Commonly known as: TYLENOL   1,000 mg, Oral, Every 6 Hours PRN      amitriptyline 10 MG tablet  Commonly known as: ELAVIL   10 mg, Oral, Nightly      bisoprolol 5 MG tablet  Commonly known as: ZEBeta   5 mg, Oral, Daily PRN      Corlanor 5 MG tablet tablet  Generic drug: ivabradine HCl   5 mg, Oral, Daily      Dexilant 60 MG capsule  Generic drug: dexlansoprazole   60 mg, Oral, Daily      DULoxetine 30 MG capsule  Commonly known as: CYMBALTA   90 mg, Oral, Daily      FOLIC ACID-CHOLECALCIFEROL PO   1 tablet, Oral, Daily      hydroxychloroquine 200 MG tablet  Commonly known as: PLAQUENIL   300 mg, Oral, Daily         LO LOESTRIN FE PO   Oral      melatonin 5 MG tablet tablet   10 mg, Oral, Nightly      methotrexate 2.5 MG tablet   2.5 mg, Oral, Weekly, 6 TABLETS PER WEEK, 15mg once a week on fridays      midodrine 5 MG tablet  Commonly known as: PROAMATINE   30 mg, Oral, Daily      multivitamin with minerals tablet tablet   1 dose, Oral, Daily      Trokendi  MG capsule sustained-release 24 hr  Generic drug: Topiramate ER   Oral, Daily      VASCULERA PO   Oral      vitamin B-12 500 MCG tablet  Commonly known as: CYANOCOBALAMIN   500 mcg, Oral, Daily      vitamin D 1.25 MG (39994 UT) capsule capsule  Commonly known as: ERGOCALCIFEROL   5,000 Units, Oral, Daily      Xiidra 5 % ophthalmic solution  Generic drug: Lifitegrast   1 drop, Both Eyes, 2 Times Daily             Allergies   Allergen Reactions   • Iron Arrhythmia     IV ONLY         Discharge Disposition: Home    Diet:  Hospital:  Diet Order   Procedures   • Diet Regular       Activity: As tolerated    Restrictions or Other Recommendations:  None       CODE STATUS:    Code Status and Medical Interventions:   Ordered at: 11/05/20 2155     Code Status:    CPR     Medical Interventions (Level of Support Prior to Arrest):    Full       No future appointments.        Alexis Ho MD  11/07/20      Time Spent on Discharge:  I spent 15 minutes on this discharge activity which included: face-to-face encounter with the patient, reviewing the data in the system, coordination of the care with the nursing staff as well as consultants, documentation, and entering orders.

## 2020-11-07 NOTE — OP NOTE
Operative Report    Patient Name:  Silvia Tracy  YOB: 1975  [id number]  11/6/2020      PREOPERATIVE DIAGNOSIS: Biliary dyskinesia      POSTOPERATIVE DIAGNOSIS: Same        PROCEDURE PERFORMED:     Laparoscopic cholecystectomy        SURGEON: Jonah Diop MD      ASSISTANT: None        SPECIMENS: Gallbladder and contents        ANESTHESIA: General.        FINDINGS:     1. Critical view of safety established prior to ligation of cystic structures        INDICATIONS:      The patient is a 45 y.o. female with a history of abdominal pain and a clinical diagnosis consistent with biliary dyskinesia.. Pre-operative imaging including HIDA scan confirmed the diagnosis. The risks, benefits and alternatives of Laparoscopic cholecystectomy were discussed with the patient and their family preoperatively and they agreed to proceed.        DESCRIPTION OF PROCEDURE:     The patient was taken to the operating room and positioned supine on the operating room table. General anesthesia was initiated. The patient was prepped and draped in the usual sterile fashion. Antibiotics were given preoperatively. SCDs were properly placed on the patient and turned on. An orogastric tube was placed by the anesthesiologists with return of gastric content prior to start of the case.     Local Anesthetic was injected prior to all skin incisions. Using an 11 blade scalpel, a small stab incision was made in the patient's left upper quadrant at Peters's point. A Veress needle was then inserted into the peritoneal cavity at this site, with aspiration and water-drop test reassuring. The abdomen was insufflated with carbon dioxide gas and pneumoperitoneum was established at 15 mmHg.  A right upper quadrant skin incision was then made with an 11 blade scalpel. Next utilizing a 5 mm Optiview Trocar with a 5 mm 30 degree laparoscope, the abdomen was then entered in the right upper quadrant under direct laparoscopic  visualization using an Optiview technique. The abdomen was then surveyed with special attention to the viscera underlying the entrance and Veress needle insertion site which were both found to be free of injury. The Veress needle was then removed. Next, under direct laparoscopic visualization two additional 5 mm trocars were placed in the right upper quadrant and a 12 mm Optiview trocar was placed in the periumbilical region.  The patient was then positioned in the head-up position with the table tilted to the left.  A 10 mm 30 degree laparoscope was then inserted through the periumbilical trocar site.    The fundus of the gallbladder was retracted cephalad while the infundibulum of the gallbladder was retracted laterally. Using a combination of hook cautery as well as a [place] dissector, the peritoneum surrounding the cystic pedical was stripped. Cystic structures were dissected. A critical view of safety was established, meanin and only 2 structures were visualized entering the gallbladder, all fibrous and fatty tissue between the cystic artery and cystic duct were cleared, and the posterior 1/3 of the gallbladder was removed from the cystic plate. Next 2 clips were placed on the distal cystic duct, 1 clip was placed on the proximal cystic duct, and the duct was transected with laparoscopic scissors. Next 2 clips were placed on the proximal cystic artery, 1 clip was placed on the distal cystic artery and this was transected with laparoscopic scissors.  Next the gallbladder was removed from the cystic plate using hook electrocautery.      A 5 mm 30 degree laparoscope was then inserted through the subxiphoid trocar site to visualize the placement of the gallbladder within an Endo Catch bag and then extraction of the gallbladder through the periumbilical trocar site utilizing the Endo Catch bag. Instruments were returned to their native positions. Hemostasis of the cystic plate was confirmed using  electrocautery. The clipped cystic structures were carefully examined and there was no sign of bilious or bloody drainage. The table was then leveled and limited irrigation of the right upper quadrant was performed with normal saline and hemostasis again confirmed.  The fascia of the periumbilical trocar site was then closed using a Porter-Virgen device with an 0 Vicryl suture under direct laparoscopic visualization. Next, the 5 mm trocars were removed under direct laparoscopic visualization and pneumoperitoneum was released     All wound sites were irrigated and hemostasis confirmed. The skin incisions were then closed using a 4-0 monocryl suture in the subcuticular layer. Mastisol and Steri Strips were then applied to each incision site with a clean and dry dressing over this.    After the procedure, the patient was awakened, extubated, and taken to the postoperative anesthesia care unit for recovery. All needle, instrument, and lap counts were correct. I was personally present and performed all portions of the procedure. There were no immediate complications         Jonah Diop MD  11/6/2020  20:21 EST

## 2020-11-09 LAB
CYTO UR: NORMAL
LAB AP CASE REPORT: NORMAL
LAB AP CLINICAL INFORMATION: NORMAL
PATH REPORT.FINAL DX SPEC: NORMAL
PATH REPORT.GROSS SPEC: NORMAL

## 2021-06-04 ENCOUNTER — HOSPITAL ENCOUNTER (EMERGENCY)
Facility: HOSPITAL | Age: 46
Discharge: HOME | End: 2021-06-04
Attending: EMERGENCY MEDICINE | Admitting: EMERGENCY MEDICINE

## 2021-06-04 VITALS
TEMPERATURE: 97.5 F | HEIGHT: 66 IN | BODY MASS INDEX: 25.71 KG/M2 | RESPIRATION RATE: 16 BRPM | HEART RATE: 72 BPM | OXYGEN SATURATION: 99 % | WEIGHT: 160 LBS | SYSTOLIC BLOOD PRESSURE: 115 MMHG | DIASTOLIC BLOOD PRESSURE: 81 MMHG

## 2021-06-04 DIAGNOSIS — K59.00 CONSTIPATION, UNSPECIFIED CONSTIPATION TYPE: Primary | ICD-10-CM

## 2021-06-04 LAB
ALBUMIN SERPL-MCNC: 4.8 G/DL (ref 3.5–5.2)
ALBUMIN/GLOB SERPL: 1.6 G/DL
ALP SERPL-CCNC: 102 U/L (ref 39–117)
ALT SERPL W P-5'-P-CCNC: 23 U/L (ref 1–33)
ANION GAP SERPL CALCULATED.3IONS-SCNC: 12 MMOL/L (ref 5–15)
AST SERPL-CCNC: 26 U/L (ref 1–32)
BASOPHILS # BLD AUTO: 0.03 10*3/MM3 (ref 0–0.2)
BASOPHILS NFR BLD AUTO: 0.3 % (ref 0–1.5)
BILIRUB SERPL-MCNC: 0.2 MG/DL (ref 0–1.2)
BUN SERPL-MCNC: 13 MG/DL (ref 6–20)
BUN/CREAT SERPL: 12.4 (ref 7–25)
CALCIUM SPEC-SCNC: 9.5 MG/DL (ref 8.6–10.5)
CHLORIDE SERPL-SCNC: 103 MMOL/L (ref 98–107)
CO2 SERPL-SCNC: 21 MMOL/L (ref 22–29)
CREAT SERPL-MCNC: 1.05 MG/DL (ref 0.57–1)
DEPRECATED RDW RBC AUTO: 48.1 FL (ref 37–54)
EOSINOPHIL # BLD AUTO: 0.08 10*3/MM3 (ref 0–0.4)
EOSINOPHIL NFR BLD AUTO: 0.9 % (ref 0.3–6.2)
ERYTHROCYTE [DISTWIDTH] IN BLOOD BY AUTOMATED COUNT: 14.1 % (ref 12.3–15.4)
GFR SERPL CREATININE-BSD FRML MDRD: 56 ML/MIN/1.73
GLOBULIN UR ELPH-MCNC: 3 GM/DL
GLUCOSE SERPL-MCNC: 94 MG/DL (ref 65–99)
HCT VFR BLD AUTO: 38.7 % (ref 34–46.6)
HGB BLD-MCNC: 12.2 G/DL (ref 12–15.9)
HOLD SPECIMEN: NORMAL
IMM GRANULOCYTES # BLD AUTO: 0.04 10*3/MM3 (ref 0–0.05)
IMM GRANULOCYTES NFR BLD AUTO: 0.5 % (ref 0–0.5)
LIPASE SERPL-CCNC: 34 U/L (ref 13–60)
LYMPHOCYTES # BLD AUTO: 0.77 10*3/MM3 (ref 0.7–3.1)
LYMPHOCYTES NFR BLD AUTO: 8.9 % (ref 19.6–45.3)
MCH RBC QN AUTO: 29.5 PG (ref 26.6–33)
MCHC RBC AUTO-ENTMCNC: 31.5 G/DL (ref 31.5–35.7)
MCV RBC AUTO: 93.7 FL (ref 79–97)
MONOCYTES # BLD AUTO: 0.57 10*3/MM3 (ref 0.1–0.9)
MONOCYTES NFR BLD AUTO: 6.6 % (ref 5–12)
NEUTROPHILS NFR BLD AUTO: 7.14 10*3/MM3 (ref 1.7–7)
NEUTROPHILS NFR BLD AUTO: 82.8 % (ref 42.7–76)
NRBC BLD AUTO-RTO: 0 /100 WBC (ref 0–0.2)
PLATELET # BLD AUTO: 311 10*3/MM3 (ref 140–450)
PMV BLD AUTO: 10.2 FL (ref 6–12)
POTASSIUM SERPL-SCNC: 4 MMOL/L (ref 3.5–5.2)
PROT SERPL-MCNC: 7.8 G/DL (ref 6–8.5)
RBC # BLD AUTO: 4.13 10*6/MM3 (ref 3.77–5.28)
SODIUM SERPL-SCNC: 136 MMOL/L (ref 136–145)
WBC # BLD AUTO: 8.63 10*3/MM3 (ref 3.4–10.8)
WHOLE BLOOD HOLD SPECIMEN: NORMAL

## 2021-06-04 PROCEDURE — 99283 EMERGENCY DEPT VISIT LOW MDM: CPT

## 2021-06-04 PROCEDURE — 80053 COMPREHEN METABOLIC PANEL: CPT | Performed by: EMERGENCY MEDICINE

## 2021-06-04 PROCEDURE — 85025 COMPLETE CBC W/AUTO DIFF WBC: CPT | Performed by: EMERGENCY MEDICINE

## 2021-06-04 PROCEDURE — 83690 ASSAY OF LIPASE: CPT | Performed by: EMERGENCY MEDICINE

## 2021-06-04 RX ORDER — LIDOCAINE HYDROCHLORIDE 20 MG/ML
JELLY TOPICAL ONCE
Status: COMPLETED | OUTPATIENT
Start: 2021-06-04 | End: 2021-06-04

## 2021-06-04 RX ORDER — SODIUM CHLORIDE 9 MG/ML
10 INJECTION INTRAVENOUS AS NEEDED
Status: DISCONTINUED | OUTPATIENT
Start: 2021-06-04 | End: 2021-06-04 | Stop reason: HOSPADM

## 2021-06-04 RX ADMIN — LIDOCAINE HYDROCHLORIDE 1 ML: 20 JELLY TOPICAL at 14:16

## 2021-06-04 NOTE — ED PROVIDER NOTES
Subjective   38-year-old female presents emergency department today with constipation that she is done about since Sunday.  Patient reports that this is a chronic and ongoing problem with her she takes MiraLAX on a regular basis yet she doubled up.  She is also takes other stimulants and has tried a fleets enema had a low bit of loose stool but no large stool out.  She had a prior history of hemorrhoids and has a lot of rectal pain.  She reports that she had no bleeding.  She has had only abdominal fullness like she needs to have a bowel movement otherwise no other complaints.  No nausea or vomiting associated with this.  No fevers chills or rigors.      History provided by:  Patient   used: No    Constipation  Severity:  Severe  Time since last bowel movement:  6 days  Timing:  Constant  Progression:  Worsening  Chronicity:  Chronic  Context: dehydration    Stool description:  Hard and pellet like  Relieved by:  Nothing  Worsened by:  Nothing  Ineffective treatments:  Defecation, enemas, laxatives and stool softeners  Associated symptoms: no abdominal pain, no back pain, no diarrhea, no dysuria, no fever, no hematochezia, no nausea, no urinary retention and no vomiting    Risk factors: no change in medication, no hx of abdominal surgery, no recent antibiotic use, no recent illness, no recent surgery and no recent travel        Review of Systems   Constitutional: Negative for chills and fever.   Respiratory: Negative for chest tightness, shortness of breath and wheezing.    Cardiovascular: Negative for chest pain and palpitations.   Gastrointestinal: Positive for constipation. Negative for abdominal pain, diarrhea, hematochezia, nausea and vomiting.   Genitourinary: Negative for dysuria, frequency and urgency.   Musculoskeletal: Negative for back pain and neck pain.   Skin: Negative for pallor and rash.   Psychiatric/Behavioral: Negative.    All other systems reviewed and are negative.      Past  Medical History:   Diagnosis Date   • Acute congestive heart failure (CMS/HCC)    • DWIGHT positive    • Anemia    • Anxiety    • Fibromyalgia    • History of Clostridium difficile     stool transplant    • History of transfusion    • History of vertebral fracture (2016) 10/27/2016   • Migraine    • Migraine without aura and without status migrainosus, not intractable 10/28/2016   • Numbness and tingling of both feet 2018   • Numbness and tingling of left leg    • POTS (postural orthostatic tachycardia syndrome)    • Sjogren's disease (CMS/HCC)        Allergies   Allergen Reactions   • Iron Arrhythmia     IV ONLY       Past Surgical History:   Procedure Laterality Date   •  SECTION     • CHOLECYSTECTOMY N/A 2020    Procedure: CHOLECYSTECTOMY LAPAROSCOPIC;  Surgeon: Jonah Diop MD;  Location: [facility];  Service: General;  Laterality: N/A;   • TEMPOROMANDIBULAR JOINT SURGERY     • TUBAL ABDOMINAL LIGATION         Family History   Problem Relation Age of Onset   • Scleroderma Mother    • Coronary artery disease Father    • COPD Father        Social History     Socioeconomic History   • Marital status:      Spouse name: Not on file   • Number of children: Not on file   • Years of education: Not on file   • Highest education level: Not on file   Tobacco Use   • Smoking status: Never Smoker   • Smokeless tobacco: Never Used   Substance and Sexual Activity   • Alcohol use: Yes     Comment: occasional glass of wine    • Drug use: Never   • Sexual activity: Defer           Objective   Physical Exam  Vitals and nursing note reviewed.   Constitutional:       General: She is not in acute distress.     Appearance: She is well-developed. She is not diaphoretic.   HENT:      Head: Normocephalic and atraumatic.      Nose: Nose normal.   Eyes:      General: No scleral icterus.     Conjunctiva/sclera: Conjunctivae normal.   Cardiovascular:      Rate and Rhythm: Normal rate and regular rhythm.       Heart sounds: Normal heart sounds. No murmur heard.     Pulmonary:      Effort: Pulmonary effort is normal. No respiratory distress.      Breath sounds: Normal breath sounds.   Abdominal:      General: Bowel sounds are normal.      Palpations: Abdomen is soft.      Tenderness: There is no abdominal tenderness.   Genitourinary:     Rectum: Normal.      Comments: Scars from previous hemorrhoidectomy.  Has sphincter tone was good.  She had some tenderness but already applied some Xylocaine jelly to the rectum.  Stool was just barely palpable at the tip of my finger is unable to manually disimpact.  Musculoskeletal:         General: Normal range of motion.      Cervical back: Normal range of motion and neck supple.   Skin:     General: Skin is warm and dry.   Neurological:      Mental Status: She is alert and oriented to person, place, and time.   Psychiatric:         Behavior: Behavior normal.         Procedures           ED Course  ED Course as of Jun 04 1620 Fri Jun 04, 2021 1619 She has passed a large stool burden feels much improved she like to be discharged home.    [BRENDEN]      ED Course User Index  [BRENDEN] Sean Negron PA                                   Recent Results (from the past 24 hour(s))   Comprehensive Metabolic Panel    Collection Time: 06/04/21  1:19 PM    Specimen: Blood   Result Value Ref Range    Glucose 94 65 - 99 mg/dL    BUN 13 6 - 20 mg/dL    Creatinine 1.05 (H) 0.57 - 1.00 mg/dL    Sodium 136 136 - 145 mmol/L    Potassium 4.0 3.5 - 5.2 mmol/L    Chloride 103 98 - 107 mmol/L    CO2 21.0 (L) 22.0 - 29.0 mmol/L    Calcium 9.5 8.6 - 10.5 mg/dL    Total Protein 7.8 6.0 - 8.5 g/dL    Albumin 4.80 3.50 - 5.20 g/dL    ALT (SGPT) 23 1 - 33 U/L    AST (SGOT) 26 1 - 32 U/L    Alkaline Phosphatase 102 39 - 117 U/L    Total Bilirubin 0.2 0.0 - 1.2 mg/dL    eGFR Non African Amer 56 (L) >60 mL/min/1.73    Globulin 3.0 gm/dL    A/G Ratio 1.6 g/dL    BUN/Creatinine Ratio 12.4 7.0 - 25.0    Anion Gap  12.0 5.0 - 15.0 mmol/L   Lipase    Collection Time: 06/04/21  1:19 PM    Specimen: Blood   Result Value Ref Range    Lipase 34 13 - 60 U/L   Green Top (Gel)    Collection Time: 06/04/21  1:19 PM   Result Value Ref Range    Extra Tube Hold for add-ons.    Lavender Top    Collection Time: 06/04/21  1:19 PM   Result Value Ref Range    Extra Tube hold for add-on    Gold Top - SST    Collection Time: 06/04/21  1:19 PM   Result Value Ref Range    Extra Tube Hold for add-ons.    CBC Auto Differential    Collection Time: 06/04/21  1:19 PM    Specimen: Blood   Result Value Ref Range    WBC 8.63 3.40 - 10.80 10*3/mm3    RBC 4.13 3.77 - 5.28 10*6/mm3    Hemoglobin 12.2 12.0 - 15.9 g/dL    Hematocrit 38.7 34.0 - 46.6 %    MCV 93.7 79.0 - 97.0 fL    MCH 29.5 26.6 - 33.0 pg    MCHC 31.5 31.5 - 35.7 g/dL    RDW 14.1 12.3 - 15.4 %    RDW-SD 48.1 37.0 - 54.0 fl    MPV 10.2 6.0 - 12.0 fL    Platelets 311 140 - 450 10*3/mm3    Neutrophil % 82.8 (H) 42.7 - 76.0 %    Lymphocyte % 8.9 (L) 19.6 - 45.3 %    Monocyte % 6.6 5.0 - 12.0 %    Eosinophil % 0.9 0.3 - 6.2 %    Basophil % 0.3 0.0 - 1.5 %    Immature Grans % 0.5 0.0 - 0.5 %    Neutrophils, Absolute 7.14 (H) 1.70 - 7.00 10*3/mm3    Lymphocytes, Absolute 0.77 0.70 - 3.10 10*3/mm3    Monocytes, Absolute 0.57 0.10 - 0.90 10*3/mm3    Eosinophils, Absolute 0.08 0.00 - 0.40 10*3/mm3    Basophils, Absolute 0.03 0.00 - 0.20 10*3/mm3    Immature Grans, Absolute 0.04 0.00 - 0.05 10*3/mm3    nRBC 0.0 0.0 - 0.2 /100 WBC     Note: In addition to lab results from this visit, the labs listed above may include labs taken at another facility or during a different encounter within the last 24 hours. Please correlate lab times with ED admission and discharge times for further clarification of the services performed during this visit.    No orders to display     Vitals:    06/04/21 1300 06/04/21 1320 06/04/21 1416   BP: 127/78 124/70 111/69   BP Location: Left arm     Patient Position: Sitting    "  Pulse: 86 80 80   Resp: 18     Temp: 97.5 °F (36.4 °C)     TempSrc: Oral     SpO2: 100% 100% 100%   Weight: 72.6 kg (160 lb)     Height: 167.6 cm (66\")       Medications   Sodium Chloride (PF) 0.9 % 10 mL (has no administration in time range)   Lidocaine HCl Urethral/Mucosal/Topical 2% (XYLOCAINE) gel (1 mL Topical Given 6/4/21 1416)     ECG/EMG Results (last 24 hours)     ** No results found for the last 24 hours. **        No orders to display               MDM  Number of Diagnoses or Management Options  Constipation, unspecified constipation type: new and requires workup     Amount and/or Complexity of Data Reviewed  Clinical lab tests: reviewed and ordered  Tests in the medicine section of CPT®: ordered and reviewed  Discuss the patient with other providers: yes    Patient Progress  Patient progress: stable      Final diagnoses:   Constipation, unspecified constipation type       ED Disposition  ED Disposition     ED Disposition Condition Comment    Discharge Stable           Soheila Gomez MD  [address] [address]  [phone]               Medication List      No changes were made to your prescriptions during this visit.          Sean Negron PA  06/04/21 2254    "

## 2021-07-09 ENCOUNTER — HOSPITAL ENCOUNTER (EMERGENCY)
Facility: HOSPITAL | Age: 46
Discharge: HOME | End: 2021-07-09
Attending: EMERGENCY MEDICINE | Admitting: EMERGENCY MEDICINE

## 2021-07-09 ENCOUNTER — APPOINTMENT (OUTPATIENT)
Dept: GENERAL RADIOLOGY | Facility: HOSPITAL | Age: 46
End: 2021-07-09

## 2021-07-09 ENCOUNTER — APPOINTMENT (OUTPATIENT)
Dept: CT IMAGING | Facility: HOSPITAL | Age: 46
End: 2021-07-09

## 2021-07-09 VITALS
SYSTOLIC BLOOD PRESSURE: 103 MMHG | TEMPERATURE: 98.2 F | HEIGHT: 66 IN | WEIGHT: 165 LBS | RESPIRATION RATE: 16 BRPM | OXYGEN SATURATION: 100 % | HEART RATE: 60 BPM | DIASTOLIC BLOOD PRESSURE: 69 MMHG | BODY MASS INDEX: 26.52 KG/M2

## 2021-07-09 DIAGNOSIS — R10.13 EPIGASTRIC ABDOMINAL PAIN: Primary | ICD-10-CM

## 2021-07-09 LAB
ALBUMIN SERPL-MCNC: 4.4 G/DL (ref 3.5–5.2)
ALBUMIN/GLOB SERPL: 1.6 G/DL
ALP SERPL-CCNC: 99 U/L (ref 39–117)
ALT SERPL W P-5'-P-CCNC: 11 U/L (ref 1–33)
ANION GAP SERPL CALCULATED.3IONS-SCNC: 12 MMOL/L (ref 5–15)
AST SERPL-CCNC: 17 U/L (ref 1–32)
BASOPHILS # BLD AUTO: 0.03 10*3/MM3 (ref 0–0.2)
BASOPHILS NFR BLD AUTO: 0.4 % (ref 0–1.5)
BILIRUB SERPL-MCNC: 0.2 MG/DL (ref 0–1.2)
BUN SERPL-MCNC: 12 MG/DL (ref 6–20)
BUN/CREAT SERPL: 13.3 (ref 7–25)
CALCIUM SPEC-SCNC: 9.4 MG/DL (ref 8.6–10.5)
CHLORIDE SERPL-SCNC: 108 MMOL/L (ref 98–107)
CO2 SERPL-SCNC: 23 MMOL/L (ref 22–29)
CREAT SERPL-MCNC: 0.9 MG/DL (ref 0.57–1)
DEPRECATED RDW RBC AUTO: 48.8 FL (ref 37–54)
EOSINOPHIL # BLD AUTO: 0.24 10*3/MM3 (ref 0–0.4)
EOSINOPHIL NFR BLD AUTO: 3.4 % (ref 0.3–6.2)
ERYTHROCYTE [DISTWIDTH] IN BLOOD BY AUTOMATED COUNT: 14.2 % (ref 12.3–15.4)
GFR SERPL CREATININE-BSD FRML MDRD: 67 ML/MIN/1.73
GLOBULIN UR ELPH-MCNC: 2.7 GM/DL
GLUCOSE SERPL-MCNC: 111 MG/DL (ref 65–99)
HCT VFR BLD AUTO: 36.9 % (ref 34–46.6)
HGB BLD-MCNC: 11.5 G/DL (ref 12–15.9)
IMM GRANULOCYTES # BLD AUTO: 0.02 10*3/MM3 (ref 0–0.05)
IMM GRANULOCYTES NFR BLD AUTO: 0.3 % (ref 0–0.5)
LIPASE SERPL-CCNC: 52 U/L (ref 13–60)
LYMPHOCYTES # BLD AUTO: 1.43 10*3/MM3 (ref 0.7–3.1)
LYMPHOCYTES NFR BLD AUTO: 20.3 % (ref 19.6–45.3)
MCH RBC QN AUTO: 29.1 PG (ref 26.6–33)
MCHC RBC AUTO-ENTMCNC: 31.2 G/DL (ref 31.5–35.7)
MCV RBC AUTO: 93.4 FL (ref 79–97)
MONOCYTES # BLD AUTO: 0.61 10*3/MM3 (ref 0.1–0.9)
MONOCYTES NFR BLD AUTO: 8.7 % (ref 5–12)
NEUTROPHILS NFR BLD AUTO: 4.72 10*3/MM3 (ref 1.7–7)
NEUTROPHILS NFR BLD AUTO: 66.9 % (ref 42.7–76)
NRBC BLD AUTO-RTO: 0 /100 WBC (ref 0–0.2)
PLATELET # BLD AUTO: 289 10*3/MM3 (ref 140–450)
PMV BLD AUTO: 10.3 FL (ref 6–12)
POTASSIUM SERPL-SCNC: 4.3 MMOL/L (ref 3.5–5.2)
PROT SERPL-MCNC: 7.1 G/DL (ref 6–8.5)
QT INTERVAL: 424 MS
QTC INTERVAL: 437 MS
RBC # BLD AUTO: 3.95 10*6/MM3 (ref 3.77–5.28)
SODIUM SERPL-SCNC: 143 MMOL/L (ref 136–145)
TROPONIN T SERPL-MCNC: <0.01 NG/ML (ref 0–0.03)
WBC # BLD AUTO: 7.05 10*3/MM3 (ref 3.4–10.8)

## 2021-07-09 PROCEDURE — 71046 X-RAY EXAM CHEST 2 VIEWS: CPT

## 2021-07-09 PROCEDURE — 83690 ASSAY OF LIPASE: CPT | Performed by: EMERGENCY MEDICINE

## 2021-07-09 PROCEDURE — 74176 CT ABD & PELVIS W/O CONTRAST: CPT

## 2021-07-09 PROCEDURE — 99283 EMERGENCY DEPT VISIT LOW MDM: CPT

## 2021-07-09 PROCEDURE — 84484 ASSAY OF TROPONIN QUANT: CPT | Performed by: EMERGENCY MEDICINE

## 2021-07-09 PROCEDURE — 96374 THER/PROPH/DIAG INJ IV PUSH: CPT

## 2021-07-09 PROCEDURE — 25010000002 ONDANSETRON PER 1 MG: Performed by: EMERGENCY MEDICINE

## 2021-07-09 PROCEDURE — 93005 ELECTROCARDIOGRAM TRACING: CPT | Performed by: EMERGENCY MEDICINE

## 2021-07-09 PROCEDURE — 80053 COMPREHEN METABOLIC PANEL: CPT | Performed by: EMERGENCY MEDICINE

## 2021-07-09 PROCEDURE — 85025 COMPLETE CBC W/AUTO DIFF WBC: CPT | Performed by: EMERGENCY MEDICINE

## 2021-07-09 RX ORDER — SODIUM CHLORIDE 0.9 % (FLUSH) 0.9 %
10 SYRINGE (ML) INJECTION AS NEEDED
Status: DISCONTINUED | OUTPATIENT
Start: 2021-07-09 | End: 2021-07-09 | Stop reason: HOSPADM

## 2021-07-09 RX ORDER — TRAMADOL HYDROCHLORIDE 200 MG/1
200 TABLET, EXTENDED RELEASE ORAL DAILY
COMMUNITY
End: 2023-02-27

## 2021-07-09 RX ORDER — PYRIDOSTIGMINE BROMIDE 60 MG/1
60 TABLET ORAL 3 TIMES DAILY
COMMUNITY
End: 2022-01-05

## 2021-07-09 RX ORDER — DEXLANSOPRAZOLE 30 MG/1
30 CAPSULE, DELAYED RELEASE ORAL DAILY
Qty: 30 CAPSULE | Refills: 0 | Status: SHIPPED | OUTPATIENT
Start: 2021-07-09 | End: 2021-08-08

## 2021-07-09 RX ORDER — ATOMOXETINE 60 MG/1
60 CAPSULE ORAL DAILY
COMMUNITY

## 2021-07-09 RX ORDER — ONDANSETRON 2 MG/ML
4 INJECTION INTRAMUSCULAR; INTRAVENOUS ONCE
Status: COMPLETED | OUTPATIENT
Start: 2021-07-09 | End: 2021-07-09

## 2021-07-09 RX ADMIN — LIDOCAINE HYDROCHLORIDE: 20 SOLUTION ORAL; TOPICAL at 15:35

## 2021-07-09 RX ADMIN — SODIUM CHLORIDE 1000 ML: 9 INJECTION, SOLUTION INTRAVENOUS at 14:43

## 2021-07-09 RX ADMIN — ONDANSETRON 4 MG: 2 INJECTION INTRAMUSCULAR; INTRAVENOUS at 14:50

## 2021-07-09 NOTE — DISCHARGE INSTRUCTIONS
Take Dexilant as prescribed.    Follow-up with primary care physician for recheck in 1 week.    Return to the ER with any further concern.

## 2021-07-09 NOTE — ED PROVIDER NOTES
Subjective   46-year-old female presents with complaint of upper abdominal pain.  She states that the pain has been present for the last 2 weeks.  It is relatively constant in nature.  Pain radiates from the anterior epigastric region around to the center of the back, and the left flank.  She denies any pain radiation into the lower abdomen or the groin region.  She denies any dysuria, frequency, or urgency.  She does report some mild diarrhea and chills.  Previous abdominal surgeries include cholecystectomy.  She reports a previous history of reflux in the past.  She states that she took Dexilant until approximate 1 month ago, but stopped because her prescription ran out at that given time.  No reported fever, bodies, or chills.  No reported sick contacts.  She has not tried any medication for these current symptoms.  She went to an urgent treatment center initially was sent here for further evaluation.  No other acute complaints.          Review of Systems   Constitutional: Negative for chills, fatigue and fever.   HENT: Negative for congestion, ear pain, postnasal drip, sinus pressure and sore throat.    Eyes: Negative for pain, redness and visual disturbance.   Respiratory: Negative for cough, chest tightness and shortness of breath.    Cardiovascular: Negative for chest pain, palpitations and leg swelling.   Gastrointestinal: Positive for abdominal pain and diarrhea. Negative for anal bleeding, blood in stool, nausea and vomiting.   Endocrine: Negative for polydipsia and polyuria.   Genitourinary: Positive for flank pain. Negative for difficulty urinating, dysuria, frequency and urgency.   Musculoskeletal: Negative for arthralgias, back pain and neck pain.   Skin: Negative for pallor and rash.   Allergic/Immunologic: Negative for environmental allergies and immunocompromised state.   Neurological: Negative for dizziness, weakness and headaches.   Hematological: Negative for adenopathy.   Psychiatric/Behavioral:  Negative for confusion, self-injury and suicidal ideas. The patient is not nervous/anxious.    All other systems reviewed and are negative.      Past Medical History:   Diagnosis Date   • Acute congestive heart failure (CMS/HCC)    • DWIGHT positive    • Anemia    • Anxiety    • Fibromyalgia    • History of Clostridium difficile     stool transplant    • History of transfusion    • History of vertebral fracture (2016) 10/27/2016   • Migraine    • Migraine without aura and without status migrainosus, not intractable 10/28/2016   • Numbness and tingling of both feet 2018   • Numbness and tingling of left leg    • POTS (postural orthostatic tachycardia syndrome)    • Sjogren's disease (CMS/HCC)        Allergies   Allergen Reactions   • Iron Arrhythmia     IV ONLY       Past Surgical History:   Procedure Laterality Date   •  SECTION     • CHOLECYSTECTOMY N/A 2020    Procedure: CHOLECYSTECTOMY LAPAROSCOPIC;  Surgeon: Jonah Diop MD;  Location: [facility];  Service: General;  Laterality: N/A;   • TEMPOROMANDIBULAR JOINT SURGERY     • TUBAL ABDOMINAL LIGATION         Family History   Problem Relation Age of Onset   • Scleroderma Mother    • Coronary artery disease Father    • COPD Father        Social History     Socioeconomic History   • Marital status:      Spouse name: Not on file   • Number of children: Not on file   • Years of education: Not on file   • Highest education level: Not on file   Tobacco Use   • Smoking status: Never Smoker   • Smokeless tobacco: Never Used   Substance and Sexual Activity   • Alcohol use: Yes     Comment: occasional glass of wine    • Drug use: Never   • Sexual activity: Defer           Objective   Physical Exam  Vitals and nursing note reviewed.   Constitutional:       General: She is not in acute distress.     Appearance: Normal appearance. She is well-developed. She is not toxic-appearing or diaphoretic.   HENT:      Head: Normocephalic and atraumatic.       Right Ear: External ear normal.      Left Ear: External ear normal.      Nose: Nose normal.   Eyes:      General: Lids are normal.      Pupils: Pupils are equal, round, and reactive to light.   Neck:      Trachea: No tracheal deviation.   Cardiovascular:      Rate and Rhythm: Normal rate and regular rhythm.      Pulses: No decreased pulses.      Heart sounds: Normal heart sounds. No murmur heard.   No friction rub. No gallop.    Pulmonary:      Effort: Pulmonary effort is normal. No respiratory distress.      Breath sounds: Normal breath sounds. No decreased breath sounds, wheezing, rhonchi or rales.   Abdominal:      General: Bowel sounds are normal.      Palpations: Abdomen is soft.      Tenderness: There is abdominal tenderness in the epigastric area. There is left CVA tenderness. There is no guarding or rebound.       Musculoskeletal:         General: No deformity. Normal range of motion.      Cervical back: Normal range of motion and neck supple.   Lymphadenopathy:      Cervical: No cervical adenopathy.   Skin:     General: Skin is warm and dry.      Findings: No rash.   Neurological:      Mental Status: She is alert and oriented to person, place, and time.      Cranial Nerves: No cranial nerve deficit.      Sensory: No sensory deficit.   Psychiatric:         Speech: Speech normal.         Behavior: Behavior normal.         Thought Content: Thought content normal.         Judgment: Judgment normal.         Procedures           ED Course                                           MDM  Number of Diagnoses or Management Options  Epigastric abdominal pain: new and requires workup  Diagnosis management comments: CT scan shows no acute abnormalities.  Lab work none revealing.  Vital signs normal.    Patient will be discharged with a new prescription for Dexilant.    Advised to avoid eating fatty or acidic foods.    Advised to follow-up with primary care physician for recheck in 1 week and return to the ER with  any further concern.       Amount and/or Complexity of Data Reviewed  Clinical lab tests: ordered and reviewed  Tests in the radiology section of CPT®: ordered and reviewed  Review and summarize past medical records: yes  Independent visualization of images, tracings, or specimens: yes        Final diagnoses:   Epigastric abdominal pain       ED Disposition  ED Disposition     ED Disposition Condition Comment    Discharge Stable           Soheila Gomez MD  [address] [address]  [phone]    In 1 week           Medication List      Changed    * Dexilant 60 MG capsule  Generic drug: dexlansoprazole  What changed: Another medication with the same name was added. Make sure you understand how and when to take each.     * dexlansoprazole 30 MG capsule  Commonly known as: DEXILANT  Take 1 capsule by mouth Daily for 30 days.  What changed: You were already taking a medication with the same name, and this prescription was added. Make sure you understand how and when to take each.         * This list has 2 medication(s) that are the same as other medications prescribed for you. Read the directions carefully, and ask your doctor or other care provider to review them with you.               Where to Get Your Medications      These medications were sent to [place], [place] - [address] [facility] - [phone]  - [phone] FX  [address] [facility] [address]    Phone: [phone]   · dexlansoprazole 30 MG capsule          Arias Santana MD  07/09/21 7932

## 2021-08-02 ENCOUNTER — TRANSCRIBE ORDERS (OUTPATIENT)
Dept: ADMINISTRATIVE | Facility: HOSPITAL | Age: 46
End: 2021-08-02

## 2021-08-02 DIAGNOSIS — R10.9 LEFT FLANK PAIN: Primary | ICD-10-CM

## 2021-08-13 ENCOUNTER — HOSPITAL ENCOUNTER (OUTPATIENT)
Dept: ULTRASOUND IMAGING | Facility: HOSPITAL | Age: 46
Discharge: HOME | End: 2021-08-13
Admitting: NURSE PRACTITIONER

## 2021-08-13 DIAGNOSIS — R10.9 LEFT FLANK PAIN: ICD-10-CM

## 2021-08-13 PROCEDURE — 76775 US EXAM ABDO BACK WALL LIM: CPT

## 2021-08-20 ENCOUNTER — OFFICE VISIT (OUTPATIENT)
Dept: OBSTETRICS AND GYNECOLOGY | Facility: CLINIC | Age: 46
End: 2021-08-20

## 2021-08-20 VITALS — BODY MASS INDEX: 26.5 KG/M2 | WEIGHT: 164.2 LBS | SYSTOLIC BLOOD PRESSURE: 124 MMHG | DIASTOLIC BLOOD PRESSURE: 76 MMHG

## 2021-08-20 DIAGNOSIS — Z01.419 WOMEN'S ANNUAL ROUTINE GYNECOLOGICAL EXAMINATION: Primary | ICD-10-CM

## 2021-08-20 DIAGNOSIS — R10.9 LEFT FLANK PAIN: ICD-10-CM

## 2021-08-20 DIAGNOSIS — R10.32 LLQ ABDOMINAL PAIN: ICD-10-CM

## 2021-08-20 DIAGNOSIS — R35.0 URINARY FREQUENCY: ICD-10-CM

## 2021-08-20 DIAGNOSIS — Z12.31 BREAST CANCER SCREENING BY MAMMOGRAM: ICD-10-CM

## 2021-08-20 LAB
BILIRUB BLD-MCNC: NEGATIVE MG/DL
CLARITY, POC: CLEAR
COLOR UR: YELLOW
GLUCOSE UR STRIP-MCNC: NEGATIVE MG/DL
KETONES UR QL: NEGATIVE
LEUKOCYTE EST, POC: NEGATIVE
NITRITE UR-MCNC: NEGATIVE MG/ML
PH UR: 5.5 [PH] (ref 5–8)
PROT UR STRIP-MCNC: NEGATIVE MG/DL
RBC # UR STRIP: NEGATIVE /UL
SP GR UR: 1.03 (ref 1–1.03)
UROBILINOGEN UR QL: NORMAL
WET PREP GENITAL: NEGATIVE

## 2021-08-20 PROCEDURE — 99396 PREV VISIT EST AGE 40-64: CPT | Performed by: NURSE PRACTITIONER

## 2021-08-20 PROCEDURE — 87210 SMEAR WET MOUNT SALINE/INK: CPT | Performed by: NURSE PRACTITIONER

## 2021-08-20 RX ORDER — FLUCONAZOLE 150 MG/1
TABLET ORAL
Qty: 2 TABLET | Refills: 0 | Status: ON HOLD | OUTPATIENT
Start: 2021-08-20 | End: 2023-03-01

## 2021-08-20 NOTE — PROGRESS NOTES
GYN Annual Exam     CC - Here for annual exam.        HPI  Silvia Tracy is a 46 y.o. female, , who presents for annual well woman exam. Patient's last menstrual period was 2021..  Periods are irregular. Patient stated that her periods are lighter and further apart. Dysmenorrhea:none.  There were no changes to her medical or surgical history since her last visit.. Partner Status: Marital Status: .  New Partners since last visit: no.  Desires STD Screening: no.  She complains of left flank pain radiating to LLQ.  She has had urine/kidney testing; all normal except for non-concerning area on her adrenal gland.  She had an endoscopy which found mild gastritis.      Additional OB/GYN History   Current contraception: contraceptive methods: Tubal ligation    Last Pap : 2018    History of abnormal Pap smear: yes - hpv  Family history of uterine, colon, breast, or ovarian cancer: yes - uterine   Performs monthly Self-Breast Exam: yes  Last mammogram:  Up to date     Exercises Regularly: no  Feelings of Anxiety or Depression: yes - both  Tobacco Usage?: No   OB History        3    Para        Term                AB        Living   3       SAB        TAB        Ectopic        Molar        Multiple        Live Births                    Health Maintenance   Topic Date Due   • Annual Gynecologic Pelvic and Breast Exam  Never done   • COLORECTAL CANCER SCREENING  Never done   • ANNUAL PHYSICAL  Never done   • TDAP/TD VACCINES (1 - Tdap) Never done   • PAP SMEAR  Never done   • INFLUENZA VACCINE  10/01/2021   • HEPATITIS C SCREENING  Completed   • COVID-19 Vaccine  Completed   • Pneumococcal Vaccine 0-64  Aged Out       The additional following portions of the patient's history were reviewed and updated as appropriate: allergies, current medications, past family history, past medical history, past social history, past surgical history and problem list.    Review of Systems    Constitutional: Negative.    HENT: Negative.    Eyes: Negative.    Respiratory: Negative.    Cardiovascular: Negative.    Gastrointestinal: Negative.    Endocrine: Negative.    Genitourinary: Negative.    Musculoskeletal: Negative.    Skin: Negative.    Allergic/Immunologic: Negative.    Neurological: Negative.    Hematological: Negative.    Psychiatric/Behavioral: Negative.          I have reviewed and agree with the HPI, ROS, and historical information as entered above. My Ramsey Franci, APRN    Objective   /76   Wt 74.5 kg (164 lb 3.2 oz)   LMP 08/06/2021   Breastfeeding No   BMI 26.50 kg/m²     Physical Exam  Vitals and nursing note reviewed. Exam conducted with a chaperone present.   Constitutional:       General: She is not in acute distress.     Appearance: Normal appearance. She is well-developed and normal weight. She is not ill-appearing.   HENT:      Head: Normocephalic and atraumatic.   Neck:      Thyroid: No thyroid mass or thyromegaly.   Pulmonary:      Effort: Pulmonary effort is normal. No retractions.   Chest:      Chest wall: No mass.      Breasts:         Right: Normal. No mass, nipple discharge, skin change or tenderness.         Left: Normal. No mass, nipple discharge, skin change or tenderness.   Abdominal:      Palpations: Abdomen is soft. Abdomen is not rigid. There is no mass.      Tenderness: There is no abdominal tenderness. There is no guarding.      Hernia: No hernia is present. There is no hernia in the left inguinal area.   Genitourinary:     General: Normal vulva.      Labia:         Right: No rash, tenderness or lesion.         Left: No rash, tenderness or lesion.       Vagina: Normal. No vaginal discharge or lesions.      Cervix: Normal.      Uterus: Normal. Not enlarged, not fixed and not tender.       Adnexa: Right adnexa normal and left adnexa normal.        Right: No mass or tenderness.          Left: No mass or tenderness.        Rectum: No external hemorrhoid.    Musculoskeletal:      Cervical back: Normal range of motion. No muscular tenderness.   Skin:     General: Skin is warm and dry.   Neurological:      Mental Status: She is alert and oriented to person, place, and time.   Psychiatric:         Mood and Affect: Mood normal.         Behavior: Behavior normal.            Assessment and Plan    Problem List Items Addressed This Visit     None      Visit Diagnoses     Women's annual routine gynecological examination    -  Primary    Relevant Orders    Pap IG, Ct-Ng, Rfx HPV ASCU    Urinary frequency        Relevant Orders    POC Urinalysis Dipstick (Completed)    LLQ abdominal pain        Relevant Orders    US Non-ob Transvaginal    POC Wet Prep (Completed)    Left flank pain              1. GYN annual well woman exam.   2. Reviewed monthly self breast exams.  Instructed to call with lumps, pain, or breast discharge.    3. Ordered Mammogram today  4. Reviewed exercise as a preventative health measures.   5. Symptoms of menopausal transition reviewed with patient.   6. RTC in 1 year or PRN with problems.   7. D/w pt CCUA neg, coincidental yeast noted, US wnl.  She will FU with PCP regarding pain.    My Koroma, APRN  08/20/2021

## 2021-08-31 DIAGNOSIS — Z01.419 WOMEN'S ANNUAL ROUTINE GYNECOLOGICAL EXAMINATION: ICD-10-CM

## 2022-01-05 ENCOUNTER — OFFICE VISIT (OUTPATIENT)
Dept: ENDOCRINOLOGY | Facility: CLINIC | Age: 47
End: 2022-01-05

## 2022-01-05 VITALS
HEART RATE: 78 BPM | DIASTOLIC BLOOD PRESSURE: 77 MMHG | SYSTOLIC BLOOD PRESSURE: 123 MMHG | OXYGEN SATURATION: 98 % | HEIGHT: 66 IN | WEIGHT: 158 LBS | BODY MASS INDEX: 25.39 KG/M2

## 2022-01-05 DIAGNOSIS — R94.6 BORDERLINE ABNORMAL TFTS: ICD-10-CM

## 2022-01-05 DIAGNOSIS — E27.8 ADRENAL HYPERPLASIA: Primary | ICD-10-CM

## 2022-01-05 PROCEDURE — 99204 OFFICE O/P NEW MOD 45 MIN: CPT | Performed by: INTERNAL MEDICINE

## 2022-01-05 RX ORDER — CHOLECALCIFEROL (VITAMIN D3) 1250 MCG
CAPSULE ORAL
COMMUNITY

## 2022-01-05 RX ORDER — MAGNESIUM CARB/ALUMINUM HYDROX 105-160MG
TABLET,CHEWABLE ORAL DAILY
Status: ON HOLD | COMMUNITY
End: 2023-03-01

## 2022-01-05 RX ORDER — AMANTADINE HYDROCHLORIDE 100 MG/1
CAPSULE, GELATIN COATED ORAL
COMMUNITY
Start: 2021-12-20 | End: 2023-02-27

## 2022-01-05 RX ORDER — AMITRIPTYLINE HYDROCHLORIDE 50 MG/1
TABLET, FILM COATED ORAL
COMMUNITY
Start: 2021-12-27 | End: 2022-01-05

## 2022-01-05 RX ORDER — HYDROXYZINE HYDROCHLORIDE 25 MG/1
TABLET, FILM COATED ORAL EVERY 8 HOURS SCHEDULED
COMMUNITY
End: 2022-01-05

## 2022-01-05 RX ORDER — TOPIRAMATE 100 MG/1
CAPSULE, EXTENDED RELEASE ORAL
COMMUNITY
End: 2022-01-05

## 2022-01-05 RX ORDER — LORAZEPAM 1 MG/1
TABLET ORAL DAILY PRN
Status: ON HOLD | COMMUNITY
Start: 2021-12-09 | End: 2023-03-01

## 2022-01-05 RX ORDER — DULOXETIN HYDROCHLORIDE 20 MG/1
60 CAPSULE, DELAYED RELEASE ORAL 2 TIMES DAILY
Status: SHIPPED | COMMUNITY
Start: 2022-01-05 | End: 2023-02-27

## 2022-01-05 RX ORDER — DULOXETIN HYDROCHLORIDE 20 MG/1
60 CAPSULE, DELAYED RELEASE ORAL DAILY
COMMUNITY
End: 2022-01-05

## 2022-01-05 RX ORDER — TRIAMCINOLONE ACETONIDE 1 MG/G
CREAM TOPICAL
COMMUNITY
Start: 2021-12-20 | End: 2023-02-27

## 2022-01-05 RX ORDER — PILOCARPINE HYDROCHLORIDE 5 MG/1
TABLET, FILM COATED ORAL
COMMUNITY
Start: 2021-11-17 | End: 2023-02-27

## 2022-01-05 NOTE — PROGRESS NOTES
"     Office Note      Date: 2022  Patient Name: Silvia Tracy  MRN: [id number]  : 1975    Chief Complaint   Patient presents with   • Adrenal Problem       History of Present Illness:   Silvia Tracy is a 46 y.o. female who presents for Adrenal Problem  AND  ABNORMAL THYROID  ------  SHE IS SEEN AS A NEW PATIENT TODAY  ------  SHE HAD A CT SCAN OF HER ABDOMEN IN 2021 FOR ABDOMINAL PAIN. IT SHOWED A MILDLY HYPERPLASTIC LEFT ADRENAL GLAND.  SHE HAS NO PRIOR HX OF ADRENAL DISEASE.    SHE TENDS TO HAVE LOW BP WITH POTS SYNDROME  SHE HAS REGULAR CYCLES  SHE  HAS NOT HAD HIRSUTISM  SHE HAS HAD ISSUES WITH LOW POTASSIUM LEVELS   SHE DOES NOT EAT MUCH BUT DOES NOT LOSE WEIGHT.    SHE HAD ONE  TSH LEVEL THAT WAS LOW. REPEAT THYROID LEVELS WERE NORMAL.   SHE HAS 3 BIOLOGICAL  CHILDREN. YOUNGEST IS 13.  SHE HAD TO HAVE FERTILITY TREATMENTS TO GET PREGNANT WITH HER TWINS.  She continues to have left flank pain         Subjective          Review of Systems:   Review of Systems   Constitutional: Positive for appetite change and fatigue. Negative for diaphoresis and unexpected weight change.   HENT: Positive for trouble swallowing. Negative for voice change.    Eyes: Positive for visual disturbance.   Endocrine: Positive for cold intolerance.   Skin:        HAIR LOSS AND DRYSKIN   Neurological: Positive for tremors.   Psychiatric/Behavioral: Positive for sleep disturbance.       The following portions of the patient's history were reviewed and updated as appropriate: allergies, current medications, past family history, past medical history, past social history, past surgical history and problem list.    Objective     Visit Vitals  /77   Pulse 78   Ht 167.6 cm (66\")   Wt 71.7 kg (158 lb)   SpO2 98%   BMI 25.50 kg/m²       Labs:    CBC w/DIFF  Lab Results   Component Value Date    WBC 7.05 2021    RBC 3.95 2021    HGB 11.5 (L) 2021    HCT 36.9 2021    MCV 93.4 " 07/09/2021    MCH 29.1 07/09/2021    MCHC 31.2 (L) 07/09/2021    RDW 14.2 07/09/2021    RDWSD 48.8 07/09/2021    MPV 10.3 07/09/2021     07/09/2021    NEUTRORELPCT 66.9 07/09/2021    LYMPHORELPCT 20.3 07/09/2021    MONORELPCT 8.7 07/09/2021    EOSRELPCT 3.4 07/09/2021    BASORELPCT 0.4 07/09/2021    AUTOIGPER 0.3 07/09/2021    NEUTROABS 4.72 07/09/2021    LYMPHSABS 1.43 07/09/2021    MONOSABS 0.61 07/09/2021    EOSABS 0.24 07/09/2021    BASOSABS 0.03 07/09/2021    AUTOIGNUM 0.02 07/09/2021    NRBC 0.0 07/09/2021       T4  No results found for: FREET4    TSH  No results found for: TSHBASE     Physical Exam:  Physical Exam  Vitals reviewed.   Constitutional:       Appearance: Normal appearance.   HENT:      Head: Normocephalic and atraumatic.   Eyes:      Extraocular Movements: Extraocular movements intact.   Neck:      Comments: No goiter  Musculoskeletal:      Comments: Left flank pain   Lymphadenopathy:      Cervical: No cervical adenopathy.   Neurological:      Mental Status: She is alert.   Psychiatric:         Mood and Affect: Mood normal.         Thought Content: Thought content normal.         Judgment: Judgment normal.         Assessment / Plan      Assessment & Plan:  Problem List Items Addressed This Visit        Other    Adrenal hyperplasia (HCC) - Primary    Current Assessment & Plan     Needs full assessment of adrenal function.  I cannot explain the left flank pain- this minimal enlargement of the adrenal should not hurt.          Relevant Orders    ACTH    Cortisol    Renin Direct Assay    Aldosterone    17-Hydroxyprogesterone    DHEA-Sulfate    Catecholamines, Fractionated, Plasma    Borderline abnormal TFTs    Overview     Low tsh last year. Normal now. This suggests the possibility of some sort of thyroiditis so will check thyroid autoimmune markers          Relevant Orders    Thyroid Peroxidase Antibody    Thyroid Stimulating Immunoglobulin           Jimmie Mccray MD   01/05/2022

## 2022-01-05 NOTE — ASSESSMENT & PLAN NOTE
Needs full assessment of adrenal function.  I cannot explain the left flank pain- this minimal enlargement of the adrenal should not hurt.

## 2022-01-14 DIAGNOSIS — E27.8 ADRENAL HYPERPLASIA: ICD-10-CM

## 2022-01-14 DIAGNOSIS — R94.6 BORDERLINE ABNORMAL TFTS: ICD-10-CM

## 2022-01-27 DIAGNOSIS — R94.6 BORDERLINE ABNORMAL TFTS: ICD-10-CM

## 2022-01-27 DIAGNOSIS — E27.8 ADRENAL HYPERPLASIA: ICD-10-CM

## 2022-06-08 ENCOUNTER — HOSPITAL ENCOUNTER (EMERGENCY)
Facility: HOSPITAL | Age: 47
Discharge: HOME | End: 2022-06-09
Attending: EMERGENCY MEDICINE | Admitting: EMERGENCY MEDICINE

## 2022-06-08 DIAGNOSIS — R55 SYNCOPE, UNSPECIFIED SYNCOPE TYPE: Primary | ICD-10-CM

## 2022-06-08 LAB
ALBUMIN SERPL-MCNC: 4.5 G/DL (ref 3.5–5.2)
ALBUMIN/GLOB SERPL: 1.5 G/DL
ALP SERPL-CCNC: 99 U/L (ref 39–117)
ALT SERPL W P-5'-P-CCNC: 14 U/L (ref 1–33)
ANION GAP SERPL CALCULATED.3IONS-SCNC: 9.9 MMOL/L (ref 5–15)
AST SERPL-CCNC: 17 U/L (ref 1–32)
BASOPHILS # BLD AUTO: 0.02 10*3/MM3 (ref 0–0.2)
BASOPHILS NFR BLD AUTO: 0.2 % (ref 0–1.5)
BILIRUB SERPL-MCNC: 0.3 MG/DL (ref 0–1.2)
BUN SERPL-MCNC: 11 MG/DL (ref 6–20)
BUN/CREAT SERPL: 11.2 (ref 7–25)
CALCIUM SPEC-SCNC: 9.1 MG/DL (ref 8.6–10.5)
CHLORIDE SERPL-SCNC: 108 MMOL/L (ref 98–107)
CO2 SERPL-SCNC: 21.1 MMOL/L (ref 22–29)
CREAT SERPL-MCNC: 0.98 MG/DL (ref 0.57–1)
DEPRECATED RDW RBC AUTO: 43.8 FL (ref 37–54)
EGFRCR SERPLBLD CKD-EPI 2021: 70.9 ML/MIN/1.73
EOSINOPHIL # BLD AUTO: 0.04 10*3/MM3 (ref 0–0.4)
EOSINOPHIL NFR BLD AUTO: 0.4 % (ref 0.3–6.2)
ERYTHROCYTE [DISTWIDTH] IN BLOOD BY AUTOMATED COUNT: 14.3 % (ref 12.3–15.4)
GLOBULIN UR ELPH-MCNC: 3 GM/DL
GLUCOSE SERPL-MCNC: 164 MG/DL (ref 65–99)
HCT VFR BLD AUTO: 34.5 % (ref 34–46.6)
HGB BLD-MCNC: 10.5 G/DL (ref 12–15.9)
IMM GRANULOCYTES # BLD AUTO: 0.03 10*3/MM3 (ref 0–0.05)
IMM GRANULOCYTES NFR BLD AUTO: 0.3 % (ref 0–0.5)
LYMPHOCYTES # BLD AUTO: 0.71 10*3/MM3 (ref 0.7–3.1)
LYMPHOCYTES NFR BLD AUTO: 8 % (ref 19.6–45.3)
MAGNESIUM SERPL-MCNC: 2.3 MG/DL (ref 1.6–2.6)
MCH RBC QN AUTO: 25.9 PG (ref 26.6–33)
MCHC RBC AUTO-ENTMCNC: 30.4 G/DL (ref 31.5–35.7)
MCV RBC AUTO: 85 FL (ref 79–97)
MONOCYTES # BLD AUTO: 0.3 10*3/MM3 (ref 0.1–0.9)
MONOCYTES NFR BLD AUTO: 3.4 % (ref 5–12)
NEUTROPHILS NFR BLD AUTO: 7.81 10*3/MM3 (ref 1.7–7)
NEUTROPHILS NFR BLD AUTO: 87.7 % (ref 42.7–76)
NRBC BLD AUTO-RTO: 0 /100 WBC (ref 0–0.2)
NT-PROBNP SERPL-MCNC: 42.4 PG/ML (ref 0–450)
PLATELET # BLD AUTO: 341 10*3/MM3 (ref 140–450)
PMV BLD AUTO: 9.7 FL (ref 6–12)
POTASSIUM SERPL-SCNC: 3.6 MMOL/L (ref 3.5–5.2)
PROT SERPL-MCNC: 7.5 G/DL (ref 6–8.5)
RBC # BLD AUTO: 4.06 10*6/MM3 (ref 3.77–5.28)
SODIUM SERPL-SCNC: 139 MMOL/L (ref 136–145)
TROPONIN T SERPL-MCNC: <0.01 NG/ML (ref 0–0.03)
WBC NRBC COR # BLD: 8.91 10*3/MM3 (ref 3.4–10.8)

## 2022-06-08 PROCEDURE — 99283 EMERGENCY DEPT VISIT LOW MDM: CPT

## 2022-06-08 PROCEDURE — 83735 ASSAY OF MAGNESIUM: CPT | Performed by: EMERGENCY MEDICINE

## 2022-06-08 PROCEDURE — 80053 COMPREHEN METABOLIC PANEL: CPT | Performed by: EMERGENCY MEDICINE

## 2022-06-08 PROCEDURE — 36415 COLL VENOUS BLD VENIPUNCTURE: CPT

## 2022-06-08 PROCEDURE — 84484 ASSAY OF TROPONIN QUANT: CPT | Performed by: EMERGENCY MEDICINE

## 2022-06-08 PROCEDURE — 93010 ELECTROCARDIOGRAM REPORT: CPT | Performed by: INTERNAL MEDICINE

## 2022-06-08 PROCEDURE — 93005 ELECTROCARDIOGRAM TRACING: CPT | Performed by: EMERGENCY MEDICINE

## 2022-06-08 PROCEDURE — 83880 ASSAY OF NATRIURETIC PEPTIDE: CPT | Performed by: EMERGENCY MEDICINE

## 2022-06-08 PROCEDURE — 93005 ELECTROCARDIOGRAM TRACING: CPT

## 2022-06-08 PROCEDURE — 85025 COMPLETE CBC W/AUTO DIFF WBC: CPT | Performed by: EMERGENCY MEDICINE

## 2022-06-08 RX ORDER — SODIUM CHLORIDE 0.9 % (FLUSH) 0.9 %
10 SYRINGE (ML) INJECTION AS NEEDED
Status: DISCONTINUED | OUTPATIENT
Start: 2022-06-08 | End: 2022-06-09 | Stop reason: HOSPADM

## 2022-06-08 RX ADMIN — SODIUM CHLORIDE 1000 ML: 9 INJECTION, SOLUTION INTRAVENOUS at 21:28

## 2022-06-08 NOTE — ED TRIAGE NOTES
.Pt masked on arrival, staff masked    Pt from event via ems, called for heat exhaustion/near syncope, has been outside for several hours, hx of POTS

## 2022-06-09 VITALS
HEART RATE: 84 BPM | RESPIRATION RATE: 15 BRPM | SYSTOLIC BLOOD PRESSURE: 108 MMHG | TEMPERATURE: 98.6 F | DIASTOLIC BLOOD PRESSURE: 78 MMHG | OXYGEN SATURATION: 98 %

## 2022-06-09 LAB — TROPONIN T SERPL-MCNC: <0.01 NG/ML (ref 0–0.03)

## 2022-06-09 NOTE — ED PROVIDER NOTES
EMERGENCY DEPARTMENT ENCOUNTER    Room Number:  18/18  Date of encounter:  6/9/2022  PCP: Soheila Gomez MD  Historian: Patient      HPI:  Chief Complaint: Syncope  A complete HPI/ROS/PMH/PSH/SH/FH are unobtainable due to: None    Context: Silvia Tracy is a 47 y.o. female who presents to the ED with history of POTS who presents with syncopal episode earlier today.  Patient was at her daughter's where she was states she was out in the sun and not drinking much water states that she does feel a bit wheezy and then after walking had to take any briefly passed out for approximately 15 to 20 seconds per family member.  Patient did fall or strike her head.  No chest pain or shortness of breath.  Patient is now she just feels tired and will be dehydrated.  No shortness of breath no chest pain cough or fever.      PAST MEDICAL HISTORY  Active Ambulatory Problems     Diagnosis Date Noted   • Anxiety and depression 10/27/2016   • History of Clostridium difficile colitis s/p fecal transplant 10/27/2016   • Impaired functional mobility, balance, gait, and endurance 11/18/2016   • Chronic fatigue 01/15/2018   • Rectal pain 07/08/2020   • Hx of hemorrhoidectomy last week 07/08/2020   • Anemia 07/08/2020   • Postoperative pain 07/09/2020   • Adrenal hyperplasia (HCC) 01/05/2022   • Borderline abnormal TFTs 01/05/2022     Resolved Ambulatory Problems     Diagnosis Date Noted   • Acute left-sided weakness 10/27/2016   • History of vertebral fracture (2/2016) 10/27/2016   • Migraine without aura and without status migrainosus, not intractable 10/28/2016   • Left-sided weakness 11/15/2016   • Demyelinating disease (HCC) 01/15/2018   • Numbness and tingling of both feet 06/19/2018   • Intractable upper abdominal pain 11/05/2020   • Dysfunctional gallbladder 11/05/2020     Past Medical History:   Diagnosis Date   • Acute congestive heart failure (HCC)    • DWIGHT positive    • Anxiety    • Fibromyalgia    • History of  Clostridium difficile    • History of transfusion    • Migraine    • Numbness and tingling of left leg    • POTS (postural orthostatic tachycardia syndrome)    • Sjogren's disease (HCC)          PAST SURGICAL HISTORY  Past Surgical History:   Procedure Laterality Date   •  SECTION     • CHOLECYSTECTOMY N/A 2020    Procedure: CHOLECYSTECTOMY LAPAROSCOPIC;  Surgeon: Jonah Diop MD;  Location: [facility] [place];  Service: General;  Laterality: N/A;   • TEMPOROMANDIBULAR JOINT SURGERY     • TUBAL ABDOMINAL LIGATION           FAMILY HISTORY  Family History   Problem Relation Age of Onset   • Scleroderma Mother    • Coronary artery disease Father    • COPD Father          SOCIAL HISTORY  Social History     Socioeconomic History   • Marital status:    Tobacco Use   • Smoking status: Never Smoker   • Smokeless tobacco: Never Used   Vaping Use   • Vaping Use: Never used   Substance and Sexual Activity   • Alcohol use: Yes     Comment: occasional glass of wine    • Drug use: Never   • Sexual activity: Yes     Birth control/protection: Tubal ligation         ALLERGIES  Iron        REVIEW OF SYSTEMS  Review of Systems     All systems reviewed and negative except for those discussed in HPI.       PHYSICAL EXAM    I have reviewed the triage vital signs and nursing notes.    ED Triage Vitals [22 1901]   Temp Heart Rate Resp BP SpO2   98.6 °F (37 °C) 84 18 134/88 100 %      Temp src Heart Rate Source Patient Position BP Location FiO2 (%)   -- -- -- -- --       Physical Exam  GENERAL: not distressed  HENT: nares patent  EYES: no scleral icterus  CV: regular rhythm, regular rate  RESPIRATORY: normal effort  ABDOMEN: soft  MUSCULOSKELETAL: no deformity  NEURO: alert, moves all extremities, follows commands  SKIN: warm, dry        LAB RESULTS  Recent Results (from the past 24 hour(s))   Comprehensive Metabolic Panel    Collection Time: 22  9:26 PM    Specimen: Blood   Result Value Ref Range    Glucose 164  (H) 65 - 99 mg/dL    BUN 11 6 - 20 mg/dL    Creatinine 0.98 0.57 - 1.00 mg/dL    Sodium 139 136 - 145 mmol/L    Potassium 3.6 3.5 - 5.2 mmol/L    Chloride 108 (H) 98 - 107 mmol/L    CO2 21.1 (L) 22.0 - 29.0 mmol/L    Calcium 9.1 8.6 - 10.5 mg/dL    Total Protein 7.5 6.0 - 8.5 g/dL    Albumin 4.50 3.50 - 5.20 g/dL    ALT (SGPT) 14 1 - 33 U/L    AST (SGOT) 17 1 - 32 U/L    Alkaline Phosphatase 99 39 - 117 U/L    Total Bilirubin 0.3 0.0 - 1.2 mg/dL    Globulin 3.0 gm/dL    A/G Ratio 1.5 g/dL    BUN/Creatinine Ratio 11.2 7.0 - 25.0    Anion Gap 9.9 5.0 - 15.0 mmol/L    eGFR 70.9 >60.0 mL/min/1.73   BNP    Collection Time: 06/08/22  9:26 PM    Specimen: Blood   Result Value Ref Range    proBNP 42.4 0.0 - 450.0 pg/mL   Troponin    Collection Time: 06/08/22  9:26 PM    Specimen: Blood   Result Value Ref Range    Troponin T <0.010 0.000 - 0.030 ng/mL   Magnesium    Collection Time: 06/08/22  9:26 PM    Specimen: Blood   Result Value Ref Range    Magnesium 2.3 1.6 - 2.6 mg/dL   CBC Auto Differential    Collection Time: 06/08/22  9:26 PM    Specimen: Blood   Result Value Ref Range    WBC 8.91 3.40 - 10.80 10*3/mm3    RBC 4.06 3.77 - 5.28 10*6/mm3    Hemoglobin 10.5 (L) 12.0 - 15.9 g/dL    Hematocrit 34.5 34.0 - 46.6 %    MCV 85.0 79.0 - 97.0 fL    MCH 25.9 (L) 26.6 - 33.0 pg    MCHC 30.4 (L) 31.5 - 35.7 g/dL    RDW 14.3 12.3 - 15.4 %    RDW-SD 43.8 37.0 - 54.0 fl    MPV 9.7 6.0 - 12.0 fL    Platelets 341 140 - 450 10*3/mm3    Neutrophil % 87.7 (H) 42.7 - 76.0 %    Lymphocyte % 8.0 (L) 19.6 - 45.3 %    Monocyte % 3.4 (L) 5.0 - 12.0 %    Eosinophil % 0.4 0.3 - 6.2 %    Basophil % 0.2 0.0 - 1.5 %    Immature Grans % 0.3 0.0 - 0.5 %    Neutrophils, Absolute 7.81 (H) 1.70 - 7.00 10*3/mm3    Lymphocytes, Absolute 0.71 0.70 - 3.10 10*3/mm3    Monocytes, Absolute 0.30 0.10 - 0.90 10*3/mm3    Eosinophils, Absolute 0.04 0.00 - 0.40 10*3/mm3    Basophils, Absolute 0.02 0.00 - 0.20 10*3/mm3    Immature Grans, Absolute 0.03 0.00 - 0.05  10*3/mm3    nRBC 0.0 0.0 - 0.2 /100 WBC   Troponin    Collection Time: 06/08/22 11:16 PM    Specimen: Blood   Result Value Ref Range    Troponin T <0.010 0.000 - 0.030 ng/mL       Ordered the above labs and independently reviewed the results.        RADIOLOGY  No Radiology Exams Resulted Within Past 24 Hours    I ordered the above noted radiological studies. Reviewed by me and discussed with radiologist.  See dictation for official radiology interpretation.      PROCEDURES    Procedures      MEDICATIONS GIVEN IN ER    Medications   sodium chloride 0.9 % flush 10 mL (has no administration in time range)   sodium chloride 0.9 % bolus 1,000 mL (0 mL Intravenous Stopped 6/8/22 1128)         PROGRESS, DATA ANALYSIS, CONSULTS, AND MEDICAL DECISION MAKING    All labs have been independently reviewed by me.  All radiology studies have been reviewed by me and discussed with radiologist dictating the report.   EKG's independently viewed and interpreted by me.  Discussion below represents my analysis of pertinent findings related to patient's condition, differential diagnosis, treatment plan and final disposition.        ED Course as of 06/09/22 0251   Wed Jun 08, 2022 2132 EKG          EKG time: 1912  Rhythm/Rate: Sinus rhythm rate 83  P waves and NM: Normal  QRS, axis: Narrow regular  ST and T waves: Nonspecific    Interpreted Contemporaneously by me, independently viewed [TJ]   Thu Jun 09, 2022   0104 Reevaluation: Patient feeling better wants to go home.  Will be discharged. [TJ]      ED Course User Index  [TJ] Mo Rmey MD           PPE: The patient wore a surgical mask throughout the entire patient encounter. I wore an N95.    AS OF 02:51 EDT VITALS:    BP - 108/78  HR - 84  TEMP - 98.6 °F (37 °C)  O2 SATS - 98%        DIAGNOSIS  Final diagnoses:   Syncope, unspecified syncope type         DISPOSITION  Discharge           Mo Remy MD  06/09/22 0251

## 2022-06-13 ENCOUNTER — TRANSCRIBE ORDERS (OUTPATIENT)
Dept: ADMINISTRATIVE | Facility: HOSPITAL | Age: 47
End: 2022-06-13

## 2022-06-13 DIAGNOSIS — R09.89 DECREASED DORSALIS PEDIS PULSE: Primary | ICD-10-CM

## 2022-06-28 ENCOUNTER — HOSPITAL ENCOUNTER (OUTPATIENT)
Dept: CT IMAGING | Facility: HOSPITAL | Age: 47
Discharge: HOME | End: 2022-06-28
Admitting: INTERNAL MEDICINE

## 2022-06-28 DIAGNOSIS — R09.89 DECREASED DORSALIS PEDIS PULSE: ICD-10-CM

## 2022-06-28 PROCEDURE — 0 IOPAMIDOL PER 1 ML: Performed by: INTERNAL MEDICINE

## 2022-06-28 PROCEDURE — 75635 CT ANGIO ABDOMINAL ARTERIES: CPT

## 2022-06-28 RX ADMIN — IOPAMIDOL 125 ML: 755 INJECTION, SOLUTION INTRAVENOUS at 14:41

## 2022-07-13 ENCOUNTER — HOSPITAL ENCOUNTER (OUTPATIENT)
Dept: GENERAL RADIOLOGY | Facility: HOSPITAL | Age: 47
Discharge: HOME | End: 2022-07-13
Admitting: INTERNAL MEDICINE

## 2022-07-13 ENCOUNTER — APPOINTMENT (OUTPATIENT)
Dept: CT IMAGING | Facility: HOSPITAL | Age: 47
End: 2022-07-13

## 2022-07-13 ENCOUNTER — HOSPITAL ENCOUNTER (EMERGENCY)
Facility: HOSPITAL | Age: 47
Discharge: HOME | End: 2022-07-14
Attending: EMERGENCY MEDICINE | Admitting: EMERGENCY MEDICINE

## 2022-07-13 ENCOUNTER — TRANSCRIBE ORDERS (OUTPATIENT)
Dept: ADMINISTRATIVE | Facility: HOSPITAL | Age: 47
End: 2022-07-13

## 2022-07-13 ENCOUNTER — APPOINTMENT (OUTPATIENT)
Dept: GENERAL RADIOLOGY | Facility: HOSPITAL | Age: 47
End: 2022-07-13

## 2022-07-13 DIAGNOSIS — Z87.19 HISTORY OF CONSTIPATION: ICD-10-CM

## 2022-07-13 DIAGNOSIS — Z86.59 HISTORY OF ANXIETY: ICD-10-CM

## 2022-07-13 DIAGNOSIS — Z87.39 HISTORY OF FIBROMYALGIA: ICD-10-CM

## 2022-07-13 DIAGNOSIS — R10.10 UPPER ABDOMINAL PAIN: Primary | ICD-10-CM

## 2022-07-13 DIAGNOSIS — G90.A POSTURAL ORTHOSTATIC TACHYCARDIA SYNDROME: ICD-10-CM

## 2022-07-13 DIAGNOSIS — R11.0 NAUSEA: ICD-10-CM

## 2022-07-13 DIAGNOSIS — M35.00 HISTORY OF SJOGREN'S DISEASE: ICD-10-CM

## 2022-07-13 DIAGNOSIS — Z18.9 RETAINED FOREIGN BODY, UNSPECIFIED MATERIAL: Primary | ICD-10-CM

## 2022-07-13 DIAGNOSIS — Z87.39 HISTORY OF RHEUMATOID ARTHRITIS: ICD-10-CM

## 2022-07-13 LAB
ALBUMIN SERPL-MCNC: 4.8 G/DL (ref 3.5–5.2)
ALBUMIN/GLOB SERPL: 1.7 G/DL
ALP SERPL-CCNC: 110 U/L (ref 39–117)
ALT SERPL W P-5'-P-CCNC: 20 U/L (ref 1–33)
ANION GAP SERPL CALCULATED.3IONS-SCNC: 14 MMOL/L (ref 5–15)
AST SERPL-CCNC: 20 U/L (ref 1–32)
BACTERIA UR QL AUTO: ABNORMAL /HPF
BASOPHILS # BLD AUTO: 0.04 10*3/MM3 (ref 0–0.2)
BASOPHILS NFR BLD AUTO: 0.7 % (ref 0–1.5)
BILIRUB SERPL-MCNC: <0.2 MG/DL (ref 0–1.2)
BILIRUB UR QL STRIP: NEGATIVE
BUN SERPL-MCNC: 11 MG/DL (ref 6–20)
BUN/CREAT SERPL: 12.4 (ref 7–25)
CALCIUM SPEC-SCNC: 9.4 MG/DL (ref 8.6–10.5)
CHLORIDE SERPL-SCNC: 104 MMOL/L (ref 98–107)
CLARITY UR: ABNORMAL
CO2 SERPL-SCNC: 23 MMOL/L (ref 22–29)
COLOR UR: YELLOW
CREAT SERPL-MCNC: 0.89 MG/DL (ref 0.57–1)
DEPRECATED RDW RBC AUTO: 56.3 FL (ref 37–54)
EGFRCR SERPLBLD CKD-EPI 2021: 79.6 ML/MIN/1.73
EOSINOPHIL # BLD AUTO: 0.19 10*3/MM3 (ref 0–0.4)
EOSINOPHIL NFR BLD AUTO: 3.1 % (ref 0.3–6.2)
ERYTHROCYTE [DISTWIDTH] IN BLOOD BY AUTOMATED COUNT: 17.6 % (ref 12.3–15.4)
GLOBULIN UR ELPH-MCNC: 2.8 GM/DL
GLUCOSE SERPL-MCNC: 96 MG/DL (ref 65–99)
GLUCOSE UR STRIP-MCNC: NEGATIVE MG/DL
HCT VFR BLD AUTO: 38.8 % (ref 34–46.6)
HGB BLD-MCNC: 11.9 G/DL (ref 12–15.9)
HGB UR QL STRIP.AUTO: ABNORMAL
HOLD SPECIMEN: NORMAL
IMM GRANULOCYTES # BLD AUTO: 0.02 10*3/MM3 (ref 0–0.05)
IMM GRANULOCYTES NFR BLD AUTO: 0.3 % (ref 0–0.5)
KETONES UR QL STRIP: NEGATIVE
LEUKOCYTE ESTERASE UR QL STRIP.AUTO: ABNORMAL
LIPASE SERPL-CCNC: 32 U/L (ref 13–60)
LYMPHOCYTES # BLD AUTO: 1.87 10*3/MM3 (ref 0.7–3.1)
LYMPHOCYTES NFR BLD AUTO: 30.8 % (ref 19.6–45.3)
MCH RBC QN AUTO: 27.7 PG (ref 26.6–33)
MCHC RBC AUTO-ENTMCNC: 30.7 G/DL (ref 31.5–35.7)
MCV RBC AUTO: 90.2 FL (ref 79–97)
MONOCYTES # BLD AUTO: 0.57 10*3/MM3 (ref 0.1–0.9)
MONOCYTES NFR BLD AUTO: 9.4 % (ref 5–12)
NEUTROPHILS NFR BLD AUTO: 3.38 10*3/MM3 (ref 1.7–7)
NEUTROPHILS NFR BLD AUTO: 55.7 % (ref 42.7–76)
NITRITE UR QL STRIP: NEGATIVE
NRBC BLD AUTO-RTO: 0 /100 WBC (ref 0–0.2)
PH UR STRIP.AUTO: 5.5 [PH] (ref 5–8)
PLATELET # BLD AUTO: 343 10*3/MM3 (ref 140–450)
PMV BLD AUTO: 9.7 FL (ref 6–12)
POTASSIUM SERPL-SCNC: 3.6 MMOL/L (ref 3.5–5.2)
PROT SERPL-MCNC: 7.6 G/DL (ref 6–8.5)
PROT UR QL STRIP: ABNORMAL
RBC # BLD AUTO: 4.3 10*6/MM3 (ref 3.77–5.28)
RBC # UR STRIP: ABNORMAL /HPF
REF LAB TEST METHOD: ABNORMAL
SODIUM SERPL-SCNC: 141 MMOL/L (ref 136–145)
SP GR UR STRIP: 1.01 (ref 1–1.03)
SQUAMOUS #/AREA URNS HPF: ABNORMAL /HPF
TROPONIN T SERPL-MCNC: <0.01 NG/ML (ref 0–0.03)
UROBILINOGEN UR QL STRIP: ABNORMAL
WBC # UR STRIP: ABNORMAL /HPF
WBC NRBC COR # BLD: 6.07 10*3/MM3 (ref 3.4–10.8)
WHOLE BLOOD HOLD COAG: NORMAL
WHOLE BLOOD HOLD SPECIMEN: NORMAL

## 2022-07-13 PROCEDURE — 71045 X-RAY EXAM CHEST 1 VIEW: CPT

## 2022-07-13 PROCEDURE — 93005 ELECTROCARDIOGRAM TRACING: CPT | Performed by: EMERGENCY MEDICINE

## 2022-07-13 PROCEDURE — 99284 EMERGENCY DEPT VISIT MOD MDM: CPT

## 2022-07-13 PROCEDURE — 87636 SARSCOV2 & INF A&B AMP PRB: CPT | Performed by: EMERGENCY MEDICINE

## 2022-07-13 PROCEDURE — 85025 COMPLETE CBC W/AUTO DIFF WBC: CPT

## 2022-07-13 PROCEDURE — 81001 URINALYSIS AUTO W/SCOPE: CPT | Performed by: EMERGENCY MEDICINE

## 2022-07-13 PROCEDURE — 84484 ASSAY OF TROPONIN QUANT: CPT | Performed by: EMERGENCY MEDICINE

## 2022-07-13 PROCEDURE — 74176 CT ABD & PELVIS W/O CONTRAST: CPT

## 2022-07-13 PROCEDURE — 74018 RADEX ABDOMEN 1 VIEW: CPT

## 2022-07-13 PROCEDURE — 83690 ASSAY OF LIPASE: CPT | Performed by: EMERGENCY MEDICINE

## 2022-07-13 PROCEDURE — 80053 COMPREHEN METABOLIC PANEL: CPT | Performed by: EMERGENCY MEDICINE

## 2022-07-13 RX ORDER — MORPHINE SULFATE 2 MG/ML
2 INJECTION, SOLUTION INTRAMUSCULAR; INTRAVENOUS ONCE
Status: DISCONTINUED | OUTPATIENT
Start: 2022-07-13 | End: 2022-07-14

## 2022-07-13 RX ORDER — SODIUM CHLORIDE 0.9 % (FLUSH) 0.9 %
10 SYRINGE (ML) INJECTION AS NEEDED
Status: DISCONTINUED | OUTPATIENT
Start: 2022-07-13 | End: 2022-07-14

## 2022-07-13 RX ORDER — METOCLOPRAMIDE HYDROCHLORIDE 5 MG/ML
10 INJECTION INTRAMUSCULAR; INTRAVENOUS ONCE
Status: DISCONTINUED | OUTPATIENT
Start: 2022-07-13 | End: 2022-07-14

## 2022-07-14 VITALS
RESPIRATION RATE: 16 BRPM | TEMPERATURE: 97.8 F | BODY MASS INDEX: 23.95 KG/M2 | DIASTOLIC BLOOD PRESSURE: 84 MMHG | WEIGHT: 149 LBS | HEART RATE: 80 BPM | SYSTOLIC BLOOD PRESSURE: 130 MMHG | HEIGHT: 66 IN | OXYGEN SATURATION: 99 %

## 2022-07-14 LAB
FLUAV SUBTYP SPEC NAA+PROBE: NOT DETECTED
FLUBV RNA ISLT QL NAA+PROBE: NOT DETECTED
SARS-COV-2 RNA PNL SPEC NAA+PROBE: NOT DETECTED

## 2022-07-14 RX ORDER — ONDANSETRON 4 MG/1
4 TABLET, ORALLY DISINTEGRATING ORAL EVERY 4 HOURS
Qty: 12 TABLET | Refills: 0 | Status: SHIPPED | OUTPATIENT
Start: 2022-07-14

## 2022-07-14 RX ORDER — METOCLOPRAMIDE 10 MG/1
10 TABLET ORAL
Qty: 40 TABLET | Refills: 0 | Status: SHIPPED | OUTPATIENT
Start: 2022-07-14 | End: 2023-02-27

## 2022-07-14 RX ORDER — METOCLOPRAMIDE 10 MG/1
10 TABLET ORAL ONCE
Status: COMPLETED | OUTPATIENT
Start: 2022-07-14 | End: 2022-07-14

## 2022-07-14 RX ADMIN — METOCLOPRAMIDE 10 MG: 10 TABLET ORAL at 00:54

## 2022-07-14 NOTE — DISCHARGE INSTRUCTIONS
ER evaluation reveals normal CBC, chemistries, and normal urinalysis.  CT of the abdomen/pelvis without contrast reveals that PillCam is located in the proximal transverse colon.  It has moved from the small bowel into the large bowel and should be expelled shortly.  There is no evidence of bowel obstruction or acute infectious or inflammatory process.  Discussed the case in detail with colorectal, Dr. Dodd and he said to reassure the patient that it should come out before long.  There is no sign of infectious process or obstructive process.  Patient will be prescribed Zofran and Reglan as directed.  Avoid greasy, spicy, or fatty foods.  Recommend close follow-up with routine GI, Dr. Barbosa for gastric emptying study which will help truly diagnose gastroparesis.  Continue with all other current medical management.  Return to the ER if worsening symptoms.

## 2022-07-14 NOTE — ED PROVIDER NOTES
"Subjective   This is a 47-year-old female that presents the ER with 48-hour history of increased upper abdominal pain with recurrent, chronic abdominal pain for the last several months.  Patient has past medical history significant for Sjogren's syndrome, POTS, rheumatoid arthritis, migraines, fibromyalgia, anxiety, iron deficiency anemia, and suspected gastroparesis.  She is followed by Dr. Alley Castro, rheumatologist.  She utilizes Salagen, Corlanor, and Hydroxychloroquine for her autoimmune disorders.  She also has been receiving IV iron infusions per hematology.  She is scheduled for her next iron infusion this Friday on 7/15/2022.  Patient says that she has had recurrent upper abdominal pain and has been following with colorectal, Dr. Carlos and Dr. Dodd.  She says that she had a normal EGD/colonoscopy approximately 2 to 3 months ago.  Patient's rheumatologist believes that she may have gastroparesis.  She has not completed a gastric emptying study.  She was referred to GI, Dr. Barbosa, for further assessment and was just given a PillCam on Monday, 7/11/2022.  Patient was advised per radiology that it would take approximately 8 hours for her to passed the PillCam, but she has not passed it.  She has been having worsening pain in the last 48 hours.  She was set up for chest x-ray and KUB in the outpatient setting today which revealed radiopaque object overlying the right mid abdomen likely representing the swallowed camera.  Patient was advised that it is probably \"lodged\".  Patient reports nausea without vomiting.  She reports severe pain that she describes as similar to a contraction with sharp, intense cramping.  She is able to pass gas from below and says that she has had 2-3 loose stools today.  Last menstrual period is now.  Patient says that she has been having increased worsening pain with just drinking water and her last p.o. intake was a bagel earlier this morning.  Previous abdominal surgeries " include cholecystectomy and C-sections with her children.  She was referred to the ER per GI, , for further assessment.  Patient denies any dysuria, urgency, or frequency.  Patient has tried lorazepam and 2 Tylenol for abdominal pain without much relief.       History provided by:  Patient  Abdominal Pain  Pain location:  Generalized (Pain is generalized now, but worse in the upper abdomen.)  Pain quality: cramping and sharp    Pain quality comment:  Sharp, intense cramping.  Pain radiates to:  Does not radiate  Onset quality:  Gradual  Duration:  48 hours  Timing:  Constant  Progression:  Worsening  Chronicity:  Recurrent  Context: previous surgery (Cholecystectomy and C-sections.)    Context comment:  Patient has had recurrent abdominal pain with normal EGD/colonoscopy 2 to 3 months ago.  She swallowed a PillCam on 7/11/2022 but has not passed that.  Concern for the PillCam getting lodged.  Relieved by:  Nothing  Worsened by:  Eating (Eating or drinking any fluids)  Ineffective treatments:  Acetaminophen (Tylenol x2 and lorazepam)  Associated symptoms: anorexia, diarrhea (2-3 loose stools today), flatus and nausea    Associated symptoms: no belching, no chest pain, no chills, no constipation, no dysuria, no fatigue, no fever, no hematemesis, no hematochezia, no hematuria, no melena, no shortness of breath, no sore throat and no vomiting        Review of Systems   Constitutional: Positive for appetite change. Negative for chills, fatigue and fever.   HENT: Negative.  Negative for congestion, postnasal drip, rhinorrhea, sinus pressure, sinus pain, sneezing and sore throat.    Respiratory: Negative.  Negative for shortness of breath.    Cardiovascular: Negative.  Negative for chest pain.   Gastrointestinal: Positive for abdominal pain (Generalized abdominal pain but worse to the upper abdomen, most notably the epigastric region.), anorexia, diarrhea (2-3 loose stools today), flatus and nausea. Negative for  constipation, hematemesis, hematochezia, melena and vomiting.   Endocrine:        Several autoimmune disorders including POTS, Sjogren's, rheumatoid arthritis, and fibromyalgia.   Genitourinary: Negative.  Negative for dysuria, flank pain, frequency, hematuria and urgency.   Musculoskeletal: Negative.  Negative for back pain.   Neurological: Negative.  Negative for dizziness, syncope, light-headedness and headaches.   All other systems reviewed and are negative.      Past Medical History:   Diagnosis Date   • Acute congestive heart failure (HCC)    • DWIGHT positive    • Anemia    • Anxiety    • Fibromyalgia    • History of Clostridium difficile     stool transplant    • History of transfusion    • History of vertebral fracture (2016) 10/27/2016   • Migraine    • Migraine without aura and without status migrainosus, not intractable 10/28/2016   • Numbness and tingling of both feet 2018   • Numbness and tingling of left leg    • POTS (postural orthostatic tachycardia syndrome)    • Sjogren's disease (HCC)        Allergies   Allergen Reactions   • Iron Arrhythmia     IV ONLY       Past Surgical History:   Procedure Laterality Date   •  SECTION     • CHOLECYSTECTOMY N/A 2020    Procedure: CHOLECYSTECTOMY LAPAROSCOPIC;  Surgeon: Jonah Diop MD;  Location: [facility];  Service: General;  Laterality: N/A;   • TEMPOROMANDIBULAR JOINT SURGERY     • TUBAL ABDOMINAL LIGATION         Family History   Problem Relation Age of Onset   • Scleroderma Mother    • Coronary artery disease Father    • COPD Father        Social History     Socioeconomic History   • Marital status:    Tobacco Use   • Smoking status: Never Smoker   • Smokeless tobacco: Never Used   Vaping Use   • Vaping Use: Never used   Substance and Sexual Activity   • Alcohol use: Yes     Comment: occasional glass of wine    • Drug use: Never   • Sexual activity: Yes     Birth control/protection: Tubal ligation           Objective    Physical Exam  Vitals and nursing note reviewed.   Constitutional:       Appearance: Normal appearance.   HENT:      Head: Normocephalic and atraumatic.      Nose: Nose normal.      Mouth/Throat:      Mouth: Mucous membranes are moist.      Pharynx: Oropharynx is clear.   Eyes:      Extraocular Movements: Extraocular movements intact.      Conjunctiva/sclera: Conjunctivae normal.      Pupils: Pupils are equal, round, and reactive to light.   Cardiovascular:      Rate and Rhythm: Normal rate and regular rhythm.  No extrasystoles are present.     Pulses: Normal pulses.      Heart sounds: Normal heart sounds.      Comments: Regular rate and rhythm.  No tachycardia or ectopy.  No pedal edema to lower extremities.  Pulmonary:      Effort: Pulmonary effort is normal.      Breath sounds: Normal breath sounds.      Comments: Lungs are clear to auscultation bilaterally  Abdominal:      General: Bowel sounds are normal. There is no distension.      Palpations: Abdomen is soft.      Tenderness: There is generalized abdominal tenderness and tenderness in the epigastric area. There is guarding. There is no right CVA tenderness, left CVA tenderness or rebound.      Comments: Abdomen soft without distention.  Active bowel sounds in all 4 quadrants.  Diffuse generalized tenderness, but moderate tenderness to epigastric region.  No flank or CVA tenderness.  Abdominal exam is nonsurgical.   Musculoskeletal:         General: Normal range of motion.      Cervical back: Normal range of motion and neck supple.      Right lower leg: No edema.      Left lower leg: No edema.   Skin:     General: Skin is warm and dry.   Neurological:      General: No focal deficit present.      Mental Status: She is alert.   Psychiatric:         Mood and Affect: Mood is anxious.         Speech: Speech normal.         Behavior: Behavior normal. Behavior is cooperative.         Thought Content: Thought content normal.         Cognition and Memory: Cognition  normal.         Judgment: Judgment normal.      Comments: Anxious and uncomfortable secondary to abdominal pain         Procedures           ED Course  ED Course as of 07/14/22 0046   Wed Jul 13, 2022 2032 CBC and chemistries were within normal limits.  Urinalysis reveals no acute infectious process.  Lipase is 32.  Troponin was less than 0.010.  EKG shows normal sinus rhythm.  No evidence of ectopy or ischemia.  Chest x-ray reveals no acute cardiopulmonary process.  Patient given IV fluid bolus, morphine, and Reglan, and also ordered CT of the abdomen/pelvis with oral and IV contrast for further assessment.  Patient's vitals are stable at this time.  We will continue to observe her closely. [FC]   u Jul 14, 2022   0020 After discussion with Dr. Griffin, ER attending physician, he did not feel that oral and IV contrast were necessary.  We proceeded with CT of the abdomen/pelvis without contrast.  Impression revealed GI PillCam in the proximal transverse colon.  No evidence of bowel obstruction or other complication.  Small bowel is not on dilated or thick-walled.  Abdominal exam is benign and nonsurgical.  I paged on-call colorectal/GI for Dr. Barbosa and am awaiting a callback from Dr. Dodd. [FC]   0031 I discussed the case in detail with Dr. Dodd.  He said that work-up seemed benign and patient should be passing PillCam surely.  Patient will be given medications and recommend gastric emptying study to truly be able to diagnose gastroparesis.  We will prescribe Reglan 10 mg by mouth 4 times daily on discharge to help with nausea as well as a promotility agent.  Recommend patient to avoid any greasy, spicy, or fatty foods.  We also will prescribe Zofran 4 mg ODT every 4-6 hours as needed for nausea/vomiting.  Abdominal exam is benign and nonsurgical. [FC]   0044 Patient was resting comfortably upon reassessment and verbalized understanding of all results and need for close recheck.  She seemed frustrated  that she has not received any medications for pain.  I had ordered IV fluids, Reglan, and morphine several hours ago, but ER was extremely busy and patient has been out in the waiting room.  When nurse tried to initiate IV medications, patient refused the IV and said she only wanted oral meds.  We will give oral Reglan and prepare patient for discharge.  Abdominal exam is benign and nonsurgical. [FC]      ED Course User Index  [FC] Vicky Chambers, CHELSIE           Recent Results (from the past 24 hour(s))   Comprehensive Metabolic Panel    Collection Time: 07/13/22  6:58 PM    Specimen: Blood   Result Value Ref Range    Glucose 96 65 - 99 mg/dL    BUN 11 6 - 20 mg/dL    Creatinine 0.89 0.57 - 1.00 mg/dL    Sodium 141 136 - 145 mmol/L    Potassium 3.6 3.5 - 5.2 mmol/L    Chloride 104 98 - 107 mmol/L    CO2 23.0 22.0 - 29.0 mmol/L    Calcium 9.4 8.6 - 10.5 mg/dL    Total Protein 7.6 6.0 - 8.5 g/dL    Albumin 4.80 3.50 - 5.20 g/dL    ALT (SGPT) 20 1 - 33 U/L    AST (SGOT) 20 1 - 32 U/L    Alkaline Phosphatase 110 39 - 117 U/L    Total Bilirubin <0.2 0.0 - 1.2 mg/dL    Globulin 2.8 gm/dL    A/G Ratio 1.7 g/dL    BUN/Creatinine Ratio 12.4 7.0 - 25.0    Anion Gap 14.0 5.0 - 15.0 mmol/L    eGFR 79.6 >60.0 mL/min/1.73   Lipase    Collection Time: 07/13/22  6:58 PM    Specimen: Blood   Result Value Ref Range    Lipase 32 13 - 60 U/L   Troponin    Collection Time: 07/13/22  6:58 PM    Specimen: Blood   Result Value Ref Range    Troponin T <0.010 0.000 - 0.030 ng/mL   Urinalysis With Microscopic If Indicated (No Culture) - Urine, Clean Catch    Collection Time: 07/13/22  6:58 PM    Specimen: Urine, Clean Catch   Result Value Ref Range    Color, UA Yellow Yellow, Straw    Appearance, UA Cloudy (A) Clear    pH, UA 5.5 5.0 - 8.0    Specific Gravity, UA 1.012 1.001 - 1.030    Glucose, UA Negative Negative    Ketones, UA Negative Negative    Bilirubin, UA Negative Negative    Blood, UA Large (3+) (A) Negative    Protein, UA Trace (A)  Negative    Leuk Esterase, UA Small (1+) (A) Negative    Nitrite, UA Negative Negative    Urobilinogen, UA 0.2 E.U./dL 0.2 - 1.0 E.U./dL   Green Top (Gel)    Collection Time: 07/13/22  6:58 PM   Result Value Ref Range    Extra Tube Hold for add-ons.    Lavender Top    Collection Time: 07/13/22  6:58 PM   Result Value Ref Range    Extra Tube hold for add-on    Gold Top - SST    Collection Time: 07/13/22  6:58 PM   Result Value Ref Range    Extra Tube Hold for add-ons.    Gray Top    Collection Time: 07/13/22  6:58 PM   Result Value Ref Range    Extra Tube Hold for add-ons.    Light Blue Top    Collection Time: 07/13/22  6:58 PM   Result Value Ref Range    Extra Tube Hold for add-ons.    CBC Auto Differential    Collection Time: 07/13/22  6:58 PM    Specimen: Blood   Result Value Ref Range    WBC 6.07 3.40 - 10.80 10*3/mm3    RBC 4.30 3.77 - 5.28 10*6/mm3    Hemoglobin 11.9 (L) 12.0 - 15.9 g/dL    Hematocrit 38.8 34.0 - 46.6 %    MCV 90.2 79.0 - 97.0 fL    MCH 27.7 26.6 - 33.0 pg    MCHC 30.7 (L) 31.5 - 35.7 g/dL    RDW 17.6 (H) 12.3 - 15.4 %    RDW-SD 56.3 (H) 37.0 - 54.0 fl    MPV 9.7 6.0 - 12.0 fL    Platelets 343 140 - 450 10*3/mm3    Neutrophil % 55.7 42.7 - 76.0 %    Lymphocyte % 30.8 19.6 - 45.3 %    Monocyte % 9.4 5.0 - 12.0 %    Eosinophil % 3.1 0.3 - 6.2 %    Basophil % 0.7 0.0 - 1.5 %    Immature Grans % 0.3 0.0 - 0.5 %    Neutrophils, Absolute 3.38 1.70 - 7.00 10*3/mm3    Lymphocytes, Absolute 1.87 0.70 - 3.10 10*3/mm3    Monocytes, Absolute 0.57 0.10 - 0.90 10*3/mm3    Eosinophils, Absolute 0.19 0.00 - 0.40 10*3/mm3    Basophils, Absolute 0.04 0.00 - 0.20 10*3/mm3    Immature Grans, Absolute 0.02 0.00 - 0.05 10*3/mm3    nRBC 0.0 0.0 - 0.2 /100 WBC   Urinalysis, Microscopic Only - Urine, Clean Catch    Collection Time: 07/13/22  6:58 PM    Specimen: Urine, Clean Catch   Result Value Ref Range    RBC, UA 0-2 None Seen, 0-2 /HPF    WBC, UA 6-12 (A) None Seen, 0-2 /HPF    Bacteria, UA Trace None Seen, Trace  "/HPF    Squamous Epithelial Cells, UA 7-12 (A) None Seen, 0-2 /HPF    Methodology Manual Light Microscopy    COVID-19 and FLU A/B PCR - Swab, Nasopharynx    Collection Time: 07/13/22 11:54 PM    Specimen: Nasopharynx; Swab   Result Value Ref Range    COVID19 Not Detected Not Detected - Ref. Range    Influenza A PCR Not Detected Not Detected    Influenza B PCR Not Detected Not Detected     Note: In addition to lab results from this visit, the labs listed above may include labs taken at another facility or during a different encounter within the last 24 hours. Please correlate lab times with ED admission and discharge times for further clarification of the services performed during this visit.    CT Abdomen Pelvis Without Contrast   Final Result       1.  GI pill camera is in the proximal transverse colon. No evidence of bowel obstruction or other complication.               Electronically signed by:  Valeria Daniels M.D.     7/13/2022 10:10 PM Mountain Time      XR Chest 1 View   Final Result   No acute cardiopulmonary process.       This report was finalized on 7/13/2022 7:40 PM by Monica Miranda MD.            Vitals:    07/13/22 1641   BP: 130/84   BP Location: Left arm   Patient Position: Sitting   Pulse: 97   Resp: 18   Temp: 97.8 °F (36.6 °C)   TempSrc: Oral   SpO2: 100%   Weight: 67.6 kg (149 lb)   Height: 167.6 cm (66\")     Medications   metoclopramide (REGLAN) tablet 10 mg (has no administration in time range)     ECG/EMG Results (last 24 hours)     Procedure Component Value Units Date/Time    ECG 12 Lead [[id number]] Collected: 07/13/22 1902     Updated: 07/13/22 1903        ECG 12 Lead   Preliminary Result                                             MDM    Final diagnoses:   Upper abdominal pain   Nausea   History of constipation   History of Sjogren's disease (HCC)   Postural orthostatic tachycardia syndrome   History of anxiety   History of fibromyalgia   History of rheumatoid arthritis       ED " Disposition  ED Disposition     ED Disposition   Discharge    Condition   Stable    Comment   --             Mo Barbosa DO  [address]  [place] [address]  [phone]    Call in 1 day  Call the morning for first available [facility]  [address] [facility] [address] [phone]    If symptoms worsen         Medication List      New Prescriptions    metoclopramide 10 MG tablet  Commonly known as: REGLAN  Take 1 tablet by mouth 4 (Four) Times a Day Before Meals & at Bedtime.     ondansetron ODT 4 MG disintegrating tablet  Commonly known as: ZOFRAN-ODT  Place 1 tablet on the tongue Every 4 (Four) Hours.           Where to Get Your Medications      These medications were sent to [id number] - [place], [place] - [address] [place] [address] AT [facility] [address] - [phone]  - [phone] [address] [address]    Phone: [phone]   · metoclopramide 10 MG tablet  · ondansetron ODT 4 MG disintegrating tablet          Vicky Chambers PA-C  07/14/22 0046

## 2022-07-15 ENCOUNTER — TRANSCRIBE ORDERS (OUTPATIENT)
Dept: ADMINISTRATIVE | Facility: HOSPITAL | Age: 47
End: 2022-07-15

## 2022-07-15 ENCOUNTER — HOSPITAL ENCOUNTER (OUTPATIENT)
Dept: GENERAL RADIOLOGY | Facility: HOSPITAL | Age: 47
Discharge: HOME | End: 2022-07-15
Admitting: INTERNAL MEDICINE

## 2022-07-15 DIAGNOSIS — T18.4XXA FOREIGN BODY IN COLON, INITIAL ENCOUNTER: Primary | ICD-10-CM

## 2022-07-15 LAB
QT INTERVAL: 350 MS
QTC INTERVAL: 442 MS

## 2022-07-15 PROCEDURE — 74018 RADEX ABDOMEN 1 VIEW: CPT

## 2023-01-06 ENCOUNTER — TRANSCRIBE ORDERS (OUTPATIENT)
Dept: ADMINISTRATIVE | Facility: HOSPITAL | Age: 48
End: 2023-01-06
Payer: COMMERCIAL

## 2023-01-06 DIAGNOSIS — R10.84 ABDOMINAL PAIN, GENERALIZED: Primary | ICD-10-CM

## 2023-01-20 ENCOUNTER — HOSPITAL ENCOUNTER (OUTPATIENT)
Dept: CT IMAGING | Facility: HOSPITAL | Age: 48
Discharge: HOME | End: 2023-01-20
Admitting: COLON & RECTAL SURGERY
Payer: COMMERCIAL

## 2023-01-20 DIAGNOSIS — R10.84 ABDOMINAL PAIN, GENERALIZED: ICD-10-CM

## 2023-01-20 PROCEDURE — 0 IOPAMIDOL PER 1 ML: Performed by: COLON & RECTAL SURGERY

## 2023-01-20 PROCEDURE — 74177 CT ABD & PELVIS W/CONTRAST: CPT

## 2023-01-20 RX ADMIN — IOPAMIDOL 124 ML: 755 INJECTION, SOLUTION INTRAVENOUS at 16:05

## 2023-02-27 ENCOUNTER — PRE-ADMISSION TESTING (OUTPATIENT)
Dept: PREADMISSION TESTING | Facility: HOSPITAL | Age: 48
End: 2023-02-27
Payer: COMMERCIAL

## 2023-02-27 VITALS — HEIGHT: 66 IN | BODY MASS INDEX: 25.95 KG/M2 | WEIGHT: 161.49 LBS

## 2023-02-27 LAB — B-HCG UR QL: NEGATIVE

## 2023-02-27 PROCEDURE — 81025 URINE PREGNANCY TEST: CPT

## 2023-02-27 PROCEDURE — 93005 ELECTROCARDIOGRAM TRACING: CPT

## 2023-02-27 PROCEDURE — 93010 ELECTROCARDIOGRAM REPORT: CPT | Performed by: INTERNAL MEDICINE

## 2023-02-27 RX ORDER — PROMETHAZINE HYDROCHLORIDE 25 MG/1
TABLET ORAL
COMMUNITY
Start: 2023-01-25

## 2023-02-27 RX ORDER — NITROFURANTOIN MACROCRYSTALS 50 MG/1
CAPSULE ORAL
Status: ON HOLD | COMMUNITY
Start: 2023-02-06 | End: 2023-03-01

## 2023-02-27 RX ORDER — IBUPROFEN 600 MG/1
TABLET ORAL
Status: ON HOLD | COMMUNITY
End: 2023-03-01

## 2023-02-27 RX ORDER — PREDNISONE 1 MG/1
TABLET ORAL
Status: ON HOLD | COMMUNITY
Start: 2023-01-31 | End: 2023-03-01

## 2023-02-27 RX ORDER — DULOXETIN HYDROCHLORIDE 60 MG/1
1 CAPSULE, DELAYED RELEASE ORAL EVERY 12 HOURS SCHEDULED
COMMUNITY
Start: 2023-01-27

## 2023-02-27 RX ORDER — METOPROLOL SUCCINATE 25 MG/1
1 TABLET, EXTENDED RELEASE ORAL DAILY
COMMUNITY
Start: 2023-02-13

## 2023-02-27 RX ORDER — DROXIDOPA 100 MG/1
CAPSULE ORAL
COMMUNITY
Start: 2023-02-14

## 2023-02-28 ENCOUNTER — ANESTHESIA EVENT (OUTPATIENT)
Dept: PERIOP | Facility: HOSPITAL | Age: 48
End: 2023-02-28
Payer: COMMERCIAL

## 2023-02-28 RX ORDER — SODIUM CHLORIDE 0.9 % (FLUSH) 0.9 %
10 SYRINGE (ML) INJECTION EVERY 12 HOURS SCHEDULED
Status: CANCELLED | OUTPATIENT
Start: 2023-02-28

## 2023-02-28 RX ORDER — SODIUM CHLORIDE 0.9 % (FLUSH) 0.9 %
10 SYRINGE (ML) INJECTION AS NEEDED
Status: CANCELLED | OUTPATIENT
Start: 2023-02-28

## 2023-02-28 RX ORDER — SODIUM CHLORIDE 9 MG/ML
40 INJECTION, SOLUTION INTRAVENOUS AS NEEDED
Status: CANCELLED | OUTPATIENT
Start: 2023-02-28

## 2023-02-28 RX ORDER — FAMOTIDINE 10 MG/ML
20 INJECTION, SOLUTION INTRAVENOUS ONCE
Status: CANCELLED | OUTPATIENT
Start: 2023-02-28 | End: 2023-02-28

## 2023-03-01 ENCOUNTER — HOSPITAL ENCOUNTER (OUTPATIENT)
Facility: HOSPITAL | Age: 48
Setting detail: HOSPITAL OUTPATIENT SURGERY
Discharge: HOME | End: 2023-03-01
Attending: OBSTETRICS & GYNECOLOGY | Admitting: OBSTETRICS & GYNECOLOGY
Payer: COMMERCIAL

## 2023-03-01 ENCOUNTER — ANESTHESIA (OUTPATIENT)
Dept: PERIOP | Facility: HOSPITAL | Age: 48
End: 2023-03-01
Payer: COMMERCIAL

## 2023-03-01 VITALS
SYSTOLIC BLOOD PRESSURE: 96 MMHG | OXYGEN SATURATION: 100 % | DIASTOLIC BLOOD PRESSURE: 53 MMHG | WEIGHT: 161 LBS | HEIGHT: 66 IN | HEART RATE: 78 BPM | BODY MASS INDEX: 25.88 KG/M2 | RESPIRATION RATE: 16 BRPM | TEMPERATURE: 97.5 F

## 2023-03-01 DIAGNOSIS — N92.0 MENORRHAGIA: ICD-10-CM

## 2023-03-01 PROBLEM — D64.9 ANEMIA: Status: RESOLVED | Noted: 2020-07-08 | Resolved: 2023-03-01

## 2023-03-01 LAB
B-HCG UR QL: NEGATIVE
EXPIRATION DATE: NORMAL
INTERNAL NEGATIVE CONTROL: NEGATIVE
INTERNAL POSITIVE CONTROL: POSITIVE
Lab: NORMAL
QT INTERVAL: 386 MS
QTC INTERVAL: 431 MS

## 2023-03-01 PROCEDURE — 25010000002 FENTANYL CITRATE (PF) 100 MCG/2ML SOLUTION

## 2023-03-01 PROCEDURE — 88305 TISSUE EXAM BY PATHOLOGIST: CPT | Performed by: OBSTETRICS & GYNECOLOGY

## 2023-03-01 PROCEDURE — 25010000002 FENTANYL CITRATE (PF) 50 MCG/ML SOLUTION

## 2023-03-01 PROCEDURE — 25010000002 CEFAZOLIN PER 500 MG

## 2023-03-01 PROCEDURE — 25010000002 DEXAMETHASONE PER 1 MG

## 2023-03-01 PROCEDURE — 63710000001 PROMETHAZINE PER 25 MG

## 2023-03-01 PROCEDURE — 25010000002 PROPOFOL 10 MG/ML EMULSION

## 2023-03-01 PROCEDURE — 25010000002 ONDANSETRON PER 1 MG

## 2023-03-01 PROCEDURE — 81025 URINE PREGNANCY TEST: CPT | Performed by: OBSTETRICS & GYNECOLOGY

## 2023-03-01 PROCEDURE — 25010000002 HEPARIN (PORCINE) PER 1000 UNITS: Performed by: OBSTETRICS & GYNECOLOGY

## 2023-03-01 RX ORDER — PHENYLEPHRINE HCL IN 0.9% NACL 1 MG/10 ML
SYRINGE (ML) INTRAVENOUS AS NEEDED
Status: DISCONTINUED | OUTPATIENT
Start: 2023-03-01 | End: 2023-03-01 | Stop reason: SURG

## 2023-03-01 RX ORDER — IPRATROPIUM BROMIDE AND ALBUTEROL SULFATE 2.5; .5 MG/3ML; MG/3ML
3 SOLUTION RESPIRATORY (INHALATION) ONCE AS NEEDED
Status: DISCONTINUED | OUTPATIENT
Start: 2023-03-01 | End: 2023-03-01 | Stop reason: HOSPADM

## 2023-03-01 RX ORDER — KETOROLAC TROMETHAMINE 30 MG/ML
30 INJECTION, SOLUTION INTRAMUSCULAR; INTRAVENOUS ONCE
Status: DISCONTINUED | OUTPATIENT
Start: 2023-03-01 | End: 2023-03-01 | Stop reason: HOSPADM

## 2023-03-01 RX ORDER — FAMOTIDINE 20 MG/1
20 TABLET, FILM COATED ORAL ONCE
Status: COMPLETED | OUTPATIENT
Start: 2023-03-01 | End: 2023-03-01

## 2023-03-01 RX ORDER — CEFAZOLIN SODIUM 1 G/3ML
INJECTION, POWDER, FOR SOLUTION INTRAMUSCULAR; INTRAVENOUS AS NEEDED
Status: DISCONTINUED | OUTPATIENT
Start: 2023-03-01 | End: 2023-03-01 | Stop reason: SURG

## 2023-03-01 RX ORDER — HYDROCODONE BITARTRATE AND ACETAMINOPHEN 5; 325 MG/1; MG/1
1 TABLET ORAL ONCE AS NEEDED
Status: CANCELLED | OUTPATIENT
Start: 2023-03-01 | End: 2023-03-08

## 2023-03-01 RX ORDER — LIDOCAINE HYDROCHLORIDE 10 MG/ML
INJECTION, SOLUTION EPIDURAL; INFILTRATION; INTRACAUDAL; PERINEURAL AS NEEDED
Status: DISCONTINUED | OUTPATIENT
Start: 2023-03-01 | End: 2023-03-01 | Stop reason: SURG

## 2023-03-01 RX ORDER — MAGNESIUM HYDROXIDE 1200 MG/15ML
LIQUID ORAL AS NEEDED
Status: DISCONTINUED | OUTPATIENT
Start: 2023-03-01 | End: 2023-03-01 | Stop reason: HOSPADM

## 2023-03-01 RX ORDER — IBUPROFEN 600 MG/1
600 TABLET ORAL EVERY 6 HOURS PRN
Status: DISCONTINUED | OUTPATIENT
Start: 2023-03-01 | End: 2023-03-01 | Stop reason: HOSPADM

## 2023-03-01 RX ORDER — ACETAMINOPHEN 500 MG
TABLET ORAL
Status: DISCONTINUED
Start: 2023-03-01 | End: 2023-03-01 | Stop reason: HOSPADM

## 2023-03-01 RX ORDER — PROMETHAZINE HYDROCHLORIDE 25 MG/1
25 SUPPOSITORY RECTAL ONCE AS NEEDED
Status: COMPLETED | OUTPATIENT
Start: 2023-03-01 | End: 2023-03-01

## 2023-03-01 RX ORDER — HYDRALAZINE HYDROCHLORIDE 20 MG/ML
5 INJECTION INTRAMUSCULAR; INTRAVENOUS
Status: DISCONTINUED | OUTPATIENT
Start: 2023-03-01 | End: 2023-03-01 | Stop reason: HOSPADM

## 2023-03-01 RX ORDER — KETOROLAC TROMETHAMINE 30 MG/ML
INJECTION, SOLUTION INTRAMUSCULAR; INTRAVENOUS
Status: DISCONTINUED
Start: 2023-03-01 | End: 2023-03-01 | Stop reason: HOSPADM

## 2023-03-01 RX ORDER — HEPARIN SODIUM 5000 [USP'U]/ML
INJECTION, SOLUTION INTRAVENOUS; SUBCUTANEOUS AS NEEDED
Status: DISCONTINUED | OUTPATIENT
Start: 2023-03-01 | End: 2023-03-01 | Stop reason: HOSPADM

## 2023-03-01 RX ORDER — SODIUM CHLORIDE, SODIUM LACTATE, POTASSIUM CHLORIDE, CALCIUM CHLORIDE 600; 310; 30; 20 MG/100ML; MG/100ML; MG/100ML; MG/100ML
9 INJECTION, SOLUTION INTRAVENOUS CONTINUOUS
Status: DISCONTINUED | OUTPATIENT
Start: 2023-03-01 | End: 2023-03-01 | Stop reason: HOSPADM

## 2023-03-01 RX ORDER — PROMETHAZINE HYDROCHLORIDE 25 MG/1
TABLET ORAL
Status: COMPLETED
Start: 2023-03-01 | End: 2023-03-01

## 2023-03-01 RX ORDER — PROMETHAZINE HYDROCHLORIDE 12.5 MG/1
12.5 TABLET ORAL ONCE AS NEEDED
Status: DISCONTINUED | OUTPATIENT
Start: 2023-03-01 | End: 2023-03-01 | Stop reason: HOSPADM

## 2023-03-01 RX ORDER — ONDANSETRON 2 MG/ML
INJECTION INTRAMUSCULAR; INTRAVENOUS AS NEEDED
Status: DISCONTINUED | OUTPATIENT
Start: 2023-03-01 | End: 2023-03-01 | Stop reason: SURG

## 2023-03-01 RX ORDER — FENTANYL CITRATE 50 UG/ML
INJECTION, SOLUTION INTRAMUSCULAR; INTRAVENOUS
Status: COMPLETED
Start: 2023-03-01 | End: 2023-03-01

## 2023-03-01 RX ORDER — LIDOCAINE HYDROCHLORIDE 10 MG/ML
0.5 INJECTION, SOLUTION EPIDURAL; INFILTRATION; INTRACAUDAL; PERINEURAL ONCE AS NEEDED
Status: COMPLETED | OUTPATIENT
Start: 2023-03-01 | End: 2023-03-01

## 2023-03-01 RX ORDER — EPHEDRINE SULFATE 50 MG/ML
INJECTION INTRAVENOUS AS NEEDED
Status: DISCONTINUED | OUTPATIENT
Start: 2023-03-01 | End: 2023-03-01 | Stop reason: SURG

## 2023-03-01 RX ORDER — HEPARIN SODIUM 5000 [USP'U]/ML
5000 INJECTION, SOLUTION INTRAVENOUS; SUBCUTANEOUS ONCE
Status: DISCONTINUED | OUTPATIENT
Start: 2023-03-01 | End: 2023-03-01 | Stop reason: HOSPADM

## 2023-03-01 RX ORDER — SODIUM CHLORIDE 9 MG/ML
40 INJECTION, SOLUTION INTRAVENOUS AS NEEDED
Status: DISCONTINUED | OUTPATIENT
Start: 2023-03-01 | End: 2023-03-01 | Stop reason: HOSPADM

## 2023-03-01 RX ORDER — NALOXONE HCL 0.4 MG/ML
0.4 VIAL (ML) INJECTION AS NEEDED
Status: DISCONTINUED | OUTPATIENT
Start: 2023-03-01 | End: 2023-03-01 | Stop reason: HOSPADM

## 2023-03-01 RX ORDER — SODIUM CHLORIDE 0.9 % (FLUSH) 0.9 %
3-10 SYRINGE (ML) INJECTION AS NEEDED
Status: DISCONTINUED | OUTPATIENT
Start: 2023-03-01 | End: 2023-03-01 | Stop reason: HOSPADM

## 2023-03-01 RX ORDER — ONDANSETRON 2 MG/ML
4 INJECTION INTRAMUSCULAR; INTRAVENOUS ONCE AS NEEDED
Status: DISCONTINUED | OUTPATIENT
Start: 2023-03-01 | End: 2023-03-01 | Stop reason: HOSPADM

## 2023-03-01 RX ORDER — SODIUM CHLORIDE 0.9 % (FLUSH) 0.9 %
3 SYRINGE (ML) INJECTION EVERY 12 HOURS SCHEDULED
Status: DISCONTINUED | OUTPATIENT
Start: 2023-03-01 | End: 2023-03-01 | Stop reason: HOSPADM

## 2023-03-01 RX ORDER — PROPOFOL 10 MG/ML
VIAL (ML) INTRAVENOUS AS NEEDED
Status: DISCONTINUED | OUTPATIENT
Start: 2023-03-01 | End: 2023-03-01 | Stop reason: SURG

## 2023-03-01 RX ORDER — MIDAZOLAM HYDROCHLORIDE 1 MG/ML
1 INJECTION INTRAMUSCULAR; INTRAVENOUS
Status: DISCONTINUED | OUTPATIENT
Start: 2023-03-01 | End: 2023-03-01 | Stop reason: HOSPADM

## 2023-03-01 RX ORDER — HYDROCODONE BITARTRATE AND ACETAMINOPHEN 5; 325 MG/1; MG/1
1 TABLET ORAL ONCE AS NEEDED
Status: DISCONTINUED | OUTPATIENT
Start: 2023-03-01 | End: 2023-03-01 | Stop reason: HOSPADM

## 2023-03-01 RX ORDER — DEXAMETHASONE SODIUM PHOSPHATE 4 MG/ML
INJECTION, SOLUTION INTRA-ARTICULAR; INTRALESIONAL; INTRAMUSCULAR; INTRAVENOUS; SOFT TISSUE AS NEEDED
Status: DISCONTINUED | OUTPATIENT
Start: 2023-03-01 | End: 2023-03-01 | Stop reason: SURG

## 2023-03-01 RX ORDER — FENTANYL CITRATE 50 UG/ML
INJECTION, SOLUTION INTRAMUSCULAR; INTRAVENOUS AS NEEDED
Status: DISCONTINUED | OUTPATIENT
Start: 2023-03-01 | End: 2023-03-01 | Stop reason: SURG

## 2023-03-01 RX ORDER — FENTANYL CITRATE 50 UG/ML
50 INJECTION, SOLUTION INTRAMUSCULAR; INTRAVENOUS
Status: DISCONTINUED | OUTPATIENT
Start: 2023-03-01 | End: 2023-03-01 | Stop reason: HOSPADM

## 2023-03-01 RX ORDER — LABETALOL HYDROCHLORIDE 5 MG/ML
5 INJECTION, SOLUTION INTRAVENOUS
Status: DISCONTINUED | OUTPATIENT
Start: 2023-03-01 | End: 2023-03-01 | Stop reason: HOSPADM

## 2023-03-01 RX ORDER — PROMETHAZINE HYDROCHLORIDE 25 MG/1
25 TABLET ORAL ONCE AS NEEDED
Status: COMPLETED | OUTPATIENT
Start: 2023-03-01 | End: 2023-03-01

## 2023-03-01 RX ADMIN — EPHEDRINE SULFATE 10 MG: 50 INJECTION INTRAVENOUS at 13:45

## 2023-03-01 RX ADMIN — Medication 100 MCG: at 13:32

## 2023-03-01 RX ADMIN — ONDANSETRON 4 MG: 2 INJECTION INTRAMUSCULAR; INTRAVENOUS at 13:44

## 2023-03-01 RX ADMIN — FENTANYL CITRATE 50 MCG: 50 INJECTION, SOLUTION INTRAMUSCULAR; INTRAVENOUS at 14:20

## 2023-03-01 RX ADMIN — FAMOTIDINE 20 MG: 20 TABLET ORAL at 12:30

## 2023-03-01 RX ADMIN — CEFAZOLIN SODIUM 2 G: 1 INJECTION, POWDER, FOR SOLUTION INTRAMUSCULAR; INTRAVENOUS at 13:25

## 2023-03-01 RX ADMIN — DEXAMETHASONE SODIUM PHOSPHATE 4 MG: 4 INJECTION, SOLUTION INTRAMUSCULAR; INTRAVENOUS at 13:44

## 2023-03-01 RX ADMIN — PROPOFOL 250 MG: 10 INJECTION, EMULSION INTRAVENOUS at 13:22

## 2023-03-01 RX ADMIN — FENTANYL CITRATE 50 MCG: 50 INJECTION, SOLUTION INTRAMUSCULAR; INTRAVENOUS at 13:22

## 2023-03-01 RX ADMIN — LIDOCAINE HYDROCHLORIDE 50 MG: 10 INJECTION, SOLUTION EPIDURAL; INFILTRATION; INTRACAUDAL; PERINEURAL at 13:22

## 2023-03-01 RX ADMIN — Medication 100 MCG: at 13:35

## 2023-03-01 RX ADMIN — EPHEDRINE SULFATE 10 MG: 50 INJECTION INTRAVENOUS at 13:50

## 2023-03-01 RX ADMIN — EPHEDRINE SULFATE 10 MG: 50 INJECTION INTRAVENOUS at 13:35

## 2023-03-01 RX ADMIN — SODIUM CHLORIDE, POTASSIUM CHLORIDE, SODIUM LACTATE AND CALCIUM CHLORIDE: 600; 310; 30; 20 INJECTION, SOLUTION INTRAVENOUS at 14:01

## 2023-03-01 RX ADMIN — PROMETHAZINE HYDROCHLORIDE 25 MG: 25 TABLET ORAL at 14:39

## 2023-03-01 RX ADMIN — SODIUM CHLORIDE, POTASSIUM CHLORIDE, SODIUM LACTATE AND CALCIUM CHLORIDE 9 ML/HR: 600; 310; 30; 20 INJECTION, SOLUTION INTRAVENOUS at 12:25

## 2023-03-01 RX ADMIN — Medication 100 MCG: at 13:27

## 2023-03-01 RX ADMIN — LIDOCAINE HYDROCHLORIDE 0.2 ML: 10 INJECTION, SOLUTION EPIDURAL; INFILTRATION; INTRACAUDAL; PERINEURAL at 12:25

## 2023-03-01 NOTE — ANESTHESIA POSTPROCEDURE EVALUATION
Patient: Silvia Tracy    Procedure Summary     Date: 03/01/23 Room / Location:  [facility]  /  [facility]    Anesthesia Start: 1315 Anesthesia Stop: 1408    Procedure: HYSTEROSCOPY WITH NOVASURE ENDOMETRIAL ABLATION (Uterus) Diagnosis:     Surgeons: Delmy Kelly MD Provider: Almita Teixeira DO    Anesthesia Type: general ASA Status: 2          Anesthesia Type: general    Vitals  Vitals Value Taken Time   /70 03/01/23 1405   Temp 97.3 °F (36.3 °C) 03/01/23 1403   Pulse 83 03/01/23 1406   Resp 16 03/01/23 1405   SpO2 99 % 03/01/23 1406   Vitals shown include unvalidated device data.        Post Anesthesia Care and Evaluation    Patient location during evaluation: PACU  Patient participation: complete - patient participated  Level of consciousness: awake and alert  Pain management: adequate    Airway patency: patent  Anesthetic complications: No anesthetic complications  PONV Status: none  Cardiovascular status: hemodynamically stable and acceptable  Respiratory status: nonlabored ventilation, acceptable and nasal cannula  Hydration status: acceptable

## 2023-03-01 NOTE — ANESTHESIA PROCEDURE NOTES
Airway  Urgency: elective    Date/Time: 3/1/2023 1:24 PM  Airway not difficult    General Information and Staff    Patient location during procedure: OR  SRNA: Carlota Josue SRNA  Indications and Patient Condition  Indications for airway management: airway protection    Preoxygenated: yes  Mask difficulty assessment: 1 - vent by mask    Final Airway Details  Final airway type: supraglottic airway      Successful airway: I-gel  Size 4     Number of attempts at approach: 1  Assessment: lips, teeth, and gum same as pre-op    Additional Comments  LMA placed without difficulty, ventilation with assist, equal breath sounds and symmetric chest rise and fall

## 2023-03-01 NOTE — INTERVAL H&P NOTE
"[facility] Pre-op    Full history and physical note from office is attached.    /71 (BP Location: Right arm, Patient Position: Sitting)   Pulse 77   Temp 97.5 °F (36.4 °C) (Temporal)   Resp 18   Ht 167.6 cm (66\")   Wt 73 kg (161 lb)   SpO2 97%   BMI 25.99 kg/m²     Immunizations:  Influenza:  No  Pneumococcal:  Unsure  Tetanus:  No  Covid : x2    Review of Systems:  Constitutional-- No fever, chills or sweats. No fatigue.  CV-- No chest pain, palpitation or syncope. +HLD  Resp-- No SOB, cough, hemoptysis  Skin--No rashes or lesions    LAB Results:  Lab Results   Component Value Date    WBC 5.21 01/23/2023    HGB 15.0 01/23/2023    HCT 47.2 (H) 01/23/2023     (H) 01/23/2023     01/23/2023    NEUTROABS 2.83 01/23/2023    GLUCOSE 96 07/13/2022    BUN 11 07/13/2022    CREATININE 0.89 07/13/2022    EGFRIFNONA 67 07/09/2021    EGFRIFAFRI >60 04/02/2019     07/13/2022    K 3.6 07/13/2022     07/13/2022    CO2 23.0 07/13/2022    MG 1.8 01/20/2023    CALCIUM 9.4 07/13/2022    ALBUMIN 4.80 07/13/2022    AST 20 07/13/2022    ALT 20 07/13/2022    BILITOT <0.2 07/13/2022    PTT 28.0 01/20/2023    INR 0.99 01/20/2023           Impression: Menorrhagia       Plan: HYSTEROSCOPY WITH NOVASURE ENDOMETRIAL ABLATION      Jalil Branch, APRN   3/1/2023   12:17 EST   "

## 2023-03-01 NOTE — ANESTHESIA PREPROCEDURE EVALUATION
Anesthesia Evaluation     Patient summary reviewed and Nursing notes reviewed   history of anesthetic complications (spinal HA):  NPO Solid Status: > 8 hours  NPO Liquid Status: > 8 hours           Airway   Mallampati: II  TM distance: >3 FB  Neck ROM: full  No difficulty expected  Dental - normal exam     Pulmonary - normal exam   Cardiovascular - normal exam    ECG reviewed    (+) hyperlipidemia,     ROS comment: POTS    Neuro/Psych  (+) headaches, psychiatric history Anxiety, Depression and PTSD,    GI/Hepatic/Renal/Endo      Musculoskeletal     Abdominal    Substance History      OB/GYN          Other   autoimmune disease Sjogren syndrome,                      Anesthesia Plan    ASA 2     general     intravenous induction     Anesthetic plan, risks, benefits, and alternatives have been provided, discussed and informed consent has been obtained with: patient.    Plan discussed with CRNA.        CODE STATUS:

## 2023-03-01 NOTE — OP NOTE
GYN Operative Note    Patient Name, age and sex:  Silvia Tracy, 48 y.o., female  Procedure Date:  3/1/2023    Surgeon(s) and Role:     * Delmy Kelly MD - Primary    Additional Staff:      Medically necessary for assistance.Complex retraction. Suturing skills.Complex and critical patient postioning.  Laparoscopic instrument use and manipulation.     * No Diagnosis Codes entered *     PREOP DX:  Menorrhagia    POSTOP DX;  SAME    * No Diagnosis Codes entered *    Procedure(s):  HYSTEROSCOPY WITH NOVASURE ENDOMETRIAL ABLATION    Indications for Operation: Menorrhagia desiring surgical management to improve quality of life from heavy menstrual flow.    Antibiotics:  cefazolin (Ancef) ordered on call to OR    Anesthesia:  Type: General -   ASA:  II    Operative Findings: The uterus is approximately 8 weeks size and anteverted.  It is pretty mobile with no adnexal masses.  Normal endometrium was noted.  Sounded to 8 cm.  Cavity length measured at 5.5 cavity width at 4.3 ablation endometrial cavity was adequately ablated.    Specimens Removed:    Specimens     ID Source Type Tests Collected By Collected At Frozen?    A Endometrium Curettings · TISSUE PATHOLOGY EXAM   Delmy Kelly MD 3/1/23 1335           Estimated Blood Loss: Minimal mL    Urine Output: Not applicable mL    Complications: None    Procedure Narrative: Patient was taken the operating room and general anesthesia was found to be adequate.  She was then prepped and draped normal sterile fashion dorsolithotomy position in the [facility].  Speculum is placed cervix grasped at 12:00 dilated #18 Earl dilator.  Hysteroscope was placed adequate visualization was obtained.  Sharp curettage was then performed to a gritty texture was noted throughout the endometrial cavity.  Uterus was sounded.  The NovaSure device was placed after the endometrial curettings were obtained and sent to pathology.  The cavity length measured at 5.5  cavity width 4.3 CO2 assessment phase past device enabled ablation cycle completed.  Repeat hysteroscopy shows uniform ablation no perforation.  Tenaculum was removed.  Some slight oozing from the tenaculum site was noted but the ring forcep placed and held pressure and the bleeding did stop.  She was then cleansed from all prep hemostasis confirmed a second time.  She was taken a lithotomy position awoken from general anesthesia and taken to recovery in stable condition.    Delmy Kelly MD - 3/1/2023, 13:57 EST

## 2023-05-18 RX ORDER — SODIUM CHLORIDE 0.9 % (FLUSH) 0.9 %
10 SYRINGE (ML) INJECTION EVERY 12 HOURS SCHEDULED
Status: CANCELLED | OUTPATIENT
Start: 2023-05-18

## 2023-05-18 RX ORDER — SODIUM CHLORIDE 0.9 % (FLUSH) 0.9 %
10 SYRINGE (ML) INJECTION AS NEEDED
Status: CANCELLED | OUTPATIENT
Start: 2023-05-18

## 2023-05-18 RX ORDER — SODIUM CHLORIDE 9 MG/ML
40 INJECTION, SOLUTION INTRAVENOUS AS NEEDED
Status: CANCELLED | OUTPATIENT
Start: 2023-05-18

## 2023-06-05 ENCOUNTER — PRE-ADMISSION TESTING (OUTPATIENT)
Dept: PREADMISSION TESTING | Facility: HOSPITAL | Age: 48
End: 2023-06-05
Payer: COMMERCIAL

## 2023-06-05 ENCOUNTER — ANESTHESIA EVENT (OUTPATIENT)
Dept: PERIOP | Facility: HOSPITAL | Age: 48
End: 2023-06-05
Payer: COMMERCIAL

## 2023-06-05 VITALS — BODY MASS INDEX: 25.62 KG/M2 | HEIGHT: 66 IN | WEIGHT: 159.39 LBS

## 2023-06-05 DIAGNOSIS — Z01.818 PREOPERATIVE TESTING: ICD-10-CM

## 2023-06-05 DIAGNOSIS — D26.9 UTERINE ADENOMYOMA: ICD-10-CM

## 2023-06-05 LAB
ANION GAP SERPL CALCULATED.3IONS-SCNC: 10 MMOL/L (ref 5–15)
BASOPHILS # BLD AUTO: 0.04 10*3/MM3 (ref 0–0.2)
BASOPHILS NFR BLD AUTO: 0.6 % (ref 0–1.5)
BUN SERPL-MCNC: 11 MG/DL (ref 6–20)
BUN/CREAT SERPL: 13.1 (ref 7–25)
CALCIUM SPEC-SCNC: 9.1 MG/DL (ref 8.6–10.5)
CHLORIDE SERPL-SCNC: 110 MMOL/L (ref 98–107)
CO2 SERPL-SCNC: 19 MMOL/L (ref 22–29)
CREAT SERPL-MCNC: 0.84 MG/DL (ref 0.57–1)
DEPRECATED RDW RBC AUTO: 45.2 FL (ref 37–54)
EGFRCR SERPLBLD CKD-EPI 2021: 84.8 ML/MIN/1.73
EOSINOPHIL # BLD AUTO: 0.25 10*3/MM3 (ref 0–0.4)
EOSINOPHIL NFR BLD AUTO: 3.7 % (ref 0.3–6.2)
ERYTHROCYTE [DISTWIDTH] IN BLOOD BY AUTOMATED COUNT: 12.5 % (ref 12.3–15.4)
GLUCOSE SERPL-MCNC: 127 MG/DL (ref 65–99)
HCT VFR BLD AUTO: 41 % (ref 34–46.6)
HGB BLD-MCNC: 13.1 G/DL (ref 12–15.9)
IMM GRANULOCYTES # BLD AUTO: 0.01 10*3/MM3 (ref 0–0.05)
IMM GRANULOCYTES NFR BLD AUTO: 0.1 % (ref 0–0.5)
LYMPHOCYTES # BLD AUTO: 1.65 10*3/MM3 (ref 0.7–3.1)
LYMPHOCYTES NFR BLD AUTO: 24.6 % (ref 19.6–45.3)
MCH RBC QN AUTO: 31.5 PG (ref 26.6–33)
MCHC RBC AUTO-ENTMCNC: 32 G/DL (ref 31.5–35.7)
MCV RBC AUTO: 98.6 FL (ref 79–97)
MONOCYTES # BLD AUTO: 0.44 10*3/MM3 (ref 0.1–0.9)
MONOCYTES NFR BLD AUTO: 6.6 % (ref 5–12)
NEUTROPHILS NFR BLD AUTO: 4.32 10*3/MM3 (ref 1.7–7)
NEUTROPHILS NFR BLD AUTO: 64.4 % (ref 42.7–76)
NRBC BLD AUTO-RTO: 0 /100 WBC (ref 0–0.2)
PLATELET # BLD AUTO: 260 10*3/MM3 (ref 140–450)
PMV BLD AUTO: 9.7 FL (ref 6–12)
POTASSIUM SERPL-SCNC: 4.4 MMOL/L (ref 3.5–5.2)
RBC # BLD AUTO: 4.16 10*6/MM3 (ref 3.77–5.28)
SODIUM SERPL-SCNC: 139 MMOL/L (ref 136–145)
WBC NRBC COR # BLD: 6.71 10*3/MM3 (ref 3.4–10.8)

## 2023-06-05 PROCEDURE — 36415 COLL VENOUS BLD VENIPUNCTURE: CPT

## 2023-06-05 PROCEDURE — 80048 BASIC METABOLIC PNL TOTAL CA: CPT

## 2023-06-05 PROCEDURE — 85025 COMPLETE CBC W/AUTO DIFF WBC: CPT

## 2023-06-05 RX ORDER — PROGESTERONE 200 MG/1
200 CAPSULE ORAL DAILY
Status: ON HOLD | COMMUNITY
End: 2023-06-06

## 2023-06-05 RX ORDER — ATOMOXETINE 80 MG/1
80 CAPSULE ORAL DAILY
COMMUNITY

## 2023-06-05 RX ORDER — FAMOTIDINE 10 MG/ML
20 INJECTION, SOLUTION INTRAVENOUS ONCE
Status: CANCELLED | OUTPATIENT
Start: 2023-06-05 | End: 2023-06-05

## 2023-06-05 RX ORDER — TRAMADOL HYDROCHLORIDE 200 MG/1
200 TABLET, EXTENDED RELEASE ORAL DAILY PRN
COMMUNITY

## 2023-06-05 RX ORDER — TEMAZEPAM 7.5 MG/1
7.5 CAPSULE ORAL NIGHTLY PRN
COMMUNITY

## 2023-06-05 NOTE — PAT
Patient viewed general [facility] education video as instructed in their preoperative information received from their surgeon.  Patient stated the general [facility] education video was viewed in its entirety and survey completed.  Copies of [facility] general education handouts (Incentive Spirometry, Meds to Beds Program, Patient Belongings, Pre-op skin preparation instructions, Blood Glucose testing, Visitor policy, Surgery FAQ, Code H) distributed to patient if not printed. Education related to the PAT pass and skin preparation for surgery (if applicable) completed in [facility] as a reinforcement to PAT education video. Patient instructed to return PAT pass provided today as well as completed skin preparation sheet (if applicable) on the day of procedure.     Additionally if patient had not viewed video yet but intended to view it at home or in our waiting area, then referred them to the handout with QR code/link provided during PAT visit.  Instructed patient to complete survey after viewing the video in its entirety.  Encouraged patient/family to read [facility] general education handouts thoroughly and notify PAT staff with any questions or concerns. Patient verbalized understanding of all information and priority content.    Patient to apply Chlorhexadine wipes  to surgical area (as instructed) the night before procedure and the AM of procedure. Wipes provided.    EKG on chart from 2/27/23.

## 2023-06-06 ENCOUNTER — HOSPITAL ENCOUNTER (OUTPATIENT)
Facility: HOSPITAL | Age: 48
Discharge: HOME | End: 2023-06-09
Attending: OBSTETRICS & GYNECOLOGY | Admitting: OBSTETRICS & GYNECOLOGY
Payer: COMMERCIAL

## 2023-06-06 ENCOUNTER — ANESTHESIA (OUTPATIENT)
Dept: PERIOP | Facility: HOSPITAL | Age: 48
End: 2023-06-06
Payer: COMMERCIAL

## 2023-06-06 DIAGNOSIS — Z74.09 IMPAIRED FUNCTIONAL MOBILITY, BALANCE, GAIT, AND ENDURANCE: ICD-10-CM

## 2023-06-06 DIAGNOSIS — Z90.710 S/P HYSTERECTOMY: ICD-10-CM

## 2023-06-06 DIAGNOSIS — D26.9 UTERINE ADENOMYOMA: Primary | ICD-10-CM

## 2023-06-06 DIAGNOSIS — G90.A POTS (POSTURAL ORTHOSTATIC TACHYCARDIA SYNDROME): ICD-10-CM

## 2023-06-06 DIAGNOSIS — Z01.818 PREOPERATIVE TESTING: ICD-10-CM

## 2023-06-06 DIAGNOSIS — R79.1 ABNORMAL BLEEDING TIME: ICD-10-CM

## 2023-06-06 DIAGNOSIS — R94.6 BORDERLINE ABNORMAL TFTS: ICD-10-CM

## 2023-06-06 DIAGNOSIS — M79.7 FIBROMYALGIA: ICD-10-CM

## 2023-06-06 DIAGNOSIS — R53.82 CHRONIC FATIGUE: ICD-10-CM

## 2023-06-06 PROBLEM — G89.18 POSTOPERATIVE PAIN: Status: RESOLVED | Noted: 2020-07-09 | Resolved: 2023-06-06

## 2023-06-06 PROBLEM — N80.03 ADENOMYOSIS OF THE UTERUS: Status: RESOLVED | Noted: 2023-06-06 | Resolved: 2023-06-06

## 2023-06-06 PROBLEM — N80.03 ADENOMYOSIS OF THE UTERUS: Status: ACTIVE | Noted: 2023-06-06

## 2023-06-06 LAB
B-HCG UR QL: NEGATIVE
EXPIRATION DATE: NORMAL
INTERNAL NEGATIVE CONTROL: NEGATIVE
INTERNAL POSITIVE CONTROL: POSITIVE
Lab: NORMAL

## 2023-06-06 PROCEDURE — 25010000002 HYDROMORPHONE 1 MG/ML SOLUTION

## 2023-06-06 PROCEDURE — 25010000002 CEFAZOLIN IN DEXTROSE 2-4 GM/100ML-% SOLUTION: Performed by: OBSTETRICS & GYNECOLOGY

## 2023-06-06 PROCEDURE — 63710000001 DIPHENHYDRAMINE PER 50 MG: Performed by: OBSTETRICS & GYNECOLOGY

## 2023-06-06 PROCEDURE — 25010000002 FENTANYL CITRATE (PF) 50 MCG/ML SOLUTION

## 2023-06-06 PROCEDURE — 25010000002 DEXAMETHASONE PER 1 MG: Performed by: NURSE ANESTHETIST, CERTIFIED REGISTERED

## 2023-06-06 PROCEDURE — 88307 TISSUE EXAM BY PATHOLOGIST: CPT | Performed by: OBSTETRICS & GYNECOLOGY

## 2023-06-06 PROCEDURE — 25010000002 ALBUMIN HUMAN 5% PER 50 ML: Performed by: NURSE ANESTHETIST, CERTIFIED REGISTERED

## 2023-06-06 PROCEDURE — C1771 REP DEV, URINARY, W/SLING: HCPCS | Performed by: OBSTETRICS & GYNECOLOGY

## 2023-06-06 PROCEDURE — 25010000002 PROPOFOL 10 MG/ML EMULSION: Performed by: NURSE ANESTHETIST, CERTIFIED REGISTERED

## 2023-06-06 PROCEDURE — 25010000002 KETOROLAC TROMETHAMINE PER 15 MG: Performed by: OBSTETRICS & GYNECOLOGY

## 2023-06-06 PROCEDURE — 25010000002 SUGAMMADEX 200 MG/2ML SOLUTION: Performed by: NURSE ANESTHETIST, CERTIFIED REGISTERED

## 2023-06-06 PROCEDURE — 25010000002 MORPHINE PER 10 MG: Performed by: OBSTETRICS & GYNECOLOGY

## 2023-06-06 PROCEDURE — 25010000002 CEFAZOLIN IN DEXTROSE 2-4 GM/100ML-% SOLUTION: Performed by: PHYSICIAN ASSISTANT

## 2023-06-06 PROCEDURE — P9041 ALBUMIN (HUMAN),5%, 50ML: HCPCS | Performed by: NURSE ANESTHETIST, CERTIFIED REGISTERED

## 2023-06-06 PROCEDURE — 25010000002 ONDANSETRON PER 1 MG: Performed by: NURSE ANESTHETIST, CERTIFIED REGISTERED

## 2023-06-06 PROCEDURE — 81025 URINE PREGNANCY TEST: CPT | Performed by: ANESTHESIOLOGY

## 2023-06-06 PROCEDURE — 25010000002 FENTANYL CITRATE (PF) 100 MCG/2ML SOLUTION: Performed by: NURSE ANESTHETIST, CERTIFIED REGISTERED

## 2023-06-06 DEVICE — CONTINENCE SYSTEM
Type: IMPLANTABLE DEVICE | Site: ABDOMEN | Status: FUNCTIONAL
Brand: GYNECARE TVT ABBREVO

## 2023-06-06 DEVICE — FLOSEAL HEMOSTATIC MATRIX, 5ML
Type: IMPLANTABLE DEVICE | Site: VAGINA | Status: FUNCTIONAL
Brand: FLOSEAL HEMOSTATIC MATRIX

## 2023-06-06 DEVICE — KNOTLESS TISSUE CONTROL DEVICE, VIOLET UNIDIRECTIONAL (ANTIBACTERIAL) SYNTHETIC ABSORBABLE DEVICE
Type: IMPLANTABLE DEVICE | Site: ABDOMEN | Status: FUNCTIONAL
Brand: STRATAFIX

## 2023-06-06 RX ORDER — MEPERIDINE HYDROCHLORIDE 25 MG/ML
12.5 INJECTION INTRAMUSCULAR; INTRAVENOUS; SUBCUTANEOUS
Status: DISCONTINUED | OUTPATIENT
Start: 2023-06-06 | End: 2023-06-06 | Stop reason: HOSPADM

## 2023-06-06 RX ORDER — IPRATROPIUM BROMIDE AND ALBUTEROL SULFATE 2.5; .5 MG/3ML; MG/3ML
3 SOLUTION RESPIRATORY (INHALATION) ONCE AS NEEDED
Status: DISCONTINUED | OUTPATIENT
Start: 2023-06-06 | End: 2023-06-06

## 2023-06-06 RX ORDER — HYDROCODONE BITARTRATE AND ACETAMINOPHEN 5; 325 MG/1; MG/1
1 TABLET ORAL ONCE AS NEEDED
Status: DISCONTINUED | OUTPATIENT
Start: 2023-06-06 | End: 2023-06-06 | Stop reason: HOSPADM

## 2023-06-06 RX ORDER — FENTANYL CITRATE 50 UG/ML
INJECTION, SOLUTION INTRAMUSCULAR; INTRAVENOUS
Status: COMPLETED
Start: 2023-06-06 | End: 2023-06-06

## 2023-06-06 RX ORDER — SODIUM CHLORIDE 0.9 % (FLUSH) 0.9 %
10 SYRINGE (ML) INJECTION AS NEEDED
Status: DISCONTINUED | OUTPATIENT
Start: 2023-06-06 | End: 2023-06-06 | Stop reason: HOSPADM

## 2023-06-06 RX ORDER — DULOXETIN HYDROCHLORIDE 60 MG/1
60 CAPSULE, DELAYED RELEASE ORAL EVERY 12 HOURS SCHEDULED
Status: DISCONTINUED | OUTPATIENT
Start: 2023-06-06 | End: 2023-06-09 | Stop reason: HOSPADM

## 2023-06-06 RX ORDER — ONDANSETRON 2 MG/ML
INJECTION INTRAMUSCULAR; INTRAVENOUS AS NEEDED
Status: DISCONTINUED | OUTPATIENT
Start: 2023-06-06 | End: 2023-06-06 | Stop reason: SURG

## 2023-06-06 RX ORDER — SODIUM CHLORIDE 9 MG/ML
40 INJECTION, SOLUTION INTRAVENOUS AS NEEDED
Status: DISCONTINUED | OUTPATIENT
Start: 2023-06-06 | End: 2023-06-06 | Stop reason: HOSPADM

## 2023-06-06 RX ORDER — SODIUM CHLORIDE 0.9 % (FLUSH) 0.9 %
3-10 SYRINGE (ML) INJECTION AS NEEDED
Status: DISCONTINUED | OUTPATIENT
Start: 2023-06-06 | End: 2023-06-06

## 2023-06-06 RX ORDER — SODIUM CHLORIDE 9 MG/ML
INJECTION, SOLUTION INTRAVENOUS AS NEEDED
Status: DISCONTINUED | OUTPATIENT
Start: 2023-06-06 | End: 2023-06-06 | Stop reason: HOSPADM

## 2023-06-06 RX ORDER — ALBUMIN, HUMAN INJ 5% 5 %
SOLUTION INTRAVENOUS CONTINUOUS PRN
Status: DISCONTINUED | OUTPATIENT
Start: 2023-06-06 | End: 2023-06-06 | Stop reason: SURG

## 2023-06-06 RX ORDER — BUPIVACAINE HYDROCHLORIDE 5 MG/ML
INJECTION, SOLUTION PERINEURAL AS NEEDED
Status: DISCONTINUED | OUTPATIENT
Start: 2023-06-06 | End: 2023-06-06 | Stop reason: HOSPADM

## 2023-06-06 RX ORDER — ONDANSETRON 4 MG/1
4 TABLET, FILM COATED ORAL EVERY 6 HOURS PRN
Status: DISCONTINUED | OUTPATIENT
Start: 2023-06-06 | End: 2023-06-09 | Stop reason: HOSPADM

## 2023-06-06 RX ORDER — SODIUM CHLORIDE 0.9 % (FLUSH) 0.9 %
3 SYRINGE (ML) INJECTION EVERY 12 HOURS SCHEDULED
Status: DISCONTINUED | OUTPATIENT
Start: 2023-06-06 | End: 2023-06-06

## 2023-06-06 RX ORDER — TEMAZEPAM 15 MG/1
15 CAPSULE ORAL NIGHTLY PRN
Status: DISCONTINUED | OUTPATIENT
Start: 2023-06-06 | End: 2023-06-09 | Stop reason: HOSPADM

## 2023-06-06 RX ORDER — SODIUM CHLORIDE, SODIUM LACTATE, POTASSIUM CHLORIDE, CALCIUM CHLORIDE 600; 310; 30; 20 MG/100ML; MG/100ML; MG/100ML; MG/100ML
9 INJECTION, SOLUTION INTRAVENOUS CONTINUOUS
Status: DISCONTINUED | OUTPATIENT
Start: 2023-06-06 | End: 2023-06-09 | Stop reason: HOSPADM

## 2023-06-06 RX ORDER — NALOXONE HCL 0.4 MG/ML
0.4 VIAL (ML) INJECTION AS NEEDED
Status: DISCONTINUED | OUTPATIENT
Start: 2023-06-06 | End: 2023-06-06

## 2023-06-06 RX ORDER — ACETAMINOPHEN 325 MG/1
650 TABLET ORAL EVERY 4 HOURS PRN
Status: DISCONTINUED | OUTPATIENT
Start: 2023-06-06 | End: 2023-06-09 | Stop reason: HOSPADM

## 2023-06-06 RX ORDER — MAGNESIUM HYDROXIDE 1200 MG/15ML
LIQUID ORAL AS NEEDED
Status: DISCONTINUED | OUTPATIENT
Start: 2023-06-06 | End: 2023-06-06 | Stop reason: HOSPADM

## 2023-06-06 RX ORDER — CEFAZOLIN SODIUM 2 G/100ML
2 INJECTION, SOLUTION INTRAVENOUS ONCE
Status: COMPLETED | OUTPATIENT
Start: 2023-06-06 | End: 2023-06-06

## 2023-06-06 RX ORDER — GABAPENTIN 100 MG/1
100 CAPSULE ORAL 3 TIMES DAILY
Status: COMPLETED | OUTPATIENT
Start: 2023-06-06 | End: 2023-06-08

## 2023-06-06 RX ORDER — PROMETHAZINE HYDROCHLORIDE 25 MG/1
25 SUPPOSITORY RECTAL ONCE AS NEEDED
Status: DISCONTINUED | OUTPATIENT
Start: 2023-06-06 | End: 2023-06-06

## 2023-06-06 RX ORDER — SODIUM CHLORIDE 9 MG/ML
40 INJECTION, SOLUTION INTRAVENOUS AS NEEDED
Status: DISCONTINUED | OUTPATIENT
Start: 2023-06-06 | End: 2023-06-06

## 2023-06-06 RX ORDER — LIDOCAINE HYDROCHLORIDE 10 MG/ML
0.5 INJECTION, SOLUTION EPIDURAL; INFILTRATION; INTRACAUDAL; PERINEURAL ONCE AS NEEDED
Status: COMPLETED | OUTPATIENT
Start: 2023-06-06 | End: 2023-06-06

## 2023-06-06 RX ORDER — BUPIVACAINE HYDROCHLORIDE AND EPINEPHRINE 2.5; 5 MG/ML; UG/ML
INJECTION, SOLUTION INFILTRATION; PERINEURAL AS NEEDED
Status: DISCONTINUED | OUTPATIENT
Start: 2023-06-06 | End: 2023-06-06 | Stop reason: HOSPADM

## 2023-06-06 RX ORDER — ONDANSETRON 2 MG/ML
4 INJECTION INTRAMUSCULAR; INTRAVENOUS ONCE AS NEEDED
Status: DISCONTINUED | OUTPATIENT
Start: 2023-06-06 | End: 2023-06-06

## 2023-06-06 RX ORDER — DROPERIDOL 2.5 MG/ML
0.62 INJECTION, SOLUTION INTRAMUSCULAR; INTRAVENOUS ONCE AS NEEDED
Status: DISCONTINUED | OUTPATIENT
Start: 2023-06-06 | End: 2023-06-06 | Stop reason: HOSPADM

## 2023-06-06 RX ORDER — LIDOCAINE HYDROCHLORIDE 10 MG/ML
INJECTION, SOLUTION EPIDURAL; INFILTRATION; INTRACAUDAL; PERINEURAL AS NEEDED
Status: DISCONTINUED | OUTPATIENT
Start: 2023-06-06 | End: 2023-06-06 | Stop reason: SURG

## 2023-06-06 RX ORDER — SIMETHICONE 80 MG
80 TABLET,CHEWABLE ORAL 4 TIMES DAILY PRN
Status: DISCONTINUED | OUTPATIENT
Start: 2023-06-06 | End: 2023-06-09 | Stop reason: HOSPADM

## 2023-06-06 RX ORDER — TOPIRAMATE 25 MG/1
25 CAPSULE, COATED PELLETS ORAL DAILY
Status: DISCONTINUED | OUTPATIENT
Start: 2023-06-06 | End: 2023-06-09 | Stop reason: HOSPADM

## 2023-06-06 RX ORDER — NALOXONE HCL 0.4 MG/ML
0.4 VIAL (ML) INJECTION
Status: DISCONTINUED | OUTPATIENT
Start: 2023-06-06 | End: 2023-06-09 | Stop reason: HOSPADM

## 2023-06-06 RX ORDER — DIPHENHYDRAMINE HCL 25 MG
25 CAPSULE ORAL EVERY 6 HOURS PRN
Status: DISCONTINUED | OUTPATIENT
Start: 2023-06-06 | End: 2023-06-09 | Stop reason: HOSPADM

## 2023-06-06 RX ORDER — DEXAMETHASONE SODIUM PHOSPHATE 4 MG/ML
INJECTION, SOLUTION INTRA-ARTICULAR; INTRALESIONAL; INTRAMUSCULAR; INTRAVENOUS; SOFT TISSUE AS NEEDED
Status: DISCONTINUED | OUTPATIENT
Start: 2023-06-06 | End: 2023-06-06 | Stop reason: SURG

## 2023-06-06 RX ORDER — PANTOPRAZOLE SODIUM 40 MG/1
40 TABLET, DELAYED RELEASE ORAL
Status: DISCONTINUED | OUTPATIENT
Start: 2023-06-06 | End: 2023-06-09 | Stop reason: HOSPADM

## 2023-06-06 RX ORDER — FENTANYL CITRATE 50 UG/ML
INJECTION, SOLUTION INTRAMUSCULAR; INTRAVENOUS AS NEEDED
Status: DISCONTINUED | OUTPATIENT
Start: 2023-06-06 | End: 2023-06-06 | Stop reason: SURG

## 2023-06-06 RX ORDER — FENTANYL CITRATE 50 UG/ML
50 INJECTION, SOLUTION INTRAMUSCULAR; INTRAVENOUS
Status: DISCONTINUED | OUTPATIENT
Start: 2023-06-06 | End: 2023-06-06 | Stop reason: HOSPADM

## 2023-06-06 RX ORDER — ACETAMINOPHEN 160 MG/5ML
650 SOLUTION ORAL EVERY 4 HOURS PRN
Status: DISCONTINUED | OUTPATIENT
Start: 2023-06-06 | End: 2023-06-09 | Stop reason: HOSPADM

## 2023-06-06 RX ORDER — SODIUM CHLORIDE 0.9 % (FLUSH) 0.9 %
10 SYRINGE (ML) INJECTION EVERY 12 HOURS SCHEDULED
Status: DISCONTINUED | OUTPATIENT
Start: 2023-06-06 | End: 2023-06-06 | Stop reason: HOSPADM

## 2023-06-06 RX ORDER — LABETALOL HYDROCHLORIDE 5 MG/ML
5 INJECTION, SOLUTION INTRAVENOUS
Status: DISCONTINUED | OUTPATIENT
Start: 2023-06-06 | End: 2023-06-06 | Stop reason: HOSPADM

## 2023-06-06 RX ORDER — TRAMADOL HYDROCHLORIDE 50 MG/1
50 TABLET ORAL EVERY 6 HOURS PRN
Status: DISCONTINUED | OUTPATIENT
Start: 2023-06-06 | End: 2023-06-07

## 2023-06-06 RX ORDER — IBUPROFEN 600 MG/1
600 TABLET ORAL EVERY 6 HOURS PRN
COMMUNITY

## 2023-06-06 RX ORDER — EPHEDRINE SULFATE 50 MG/ML
INJECTION INTRAVENOUS AS NEEDED
Status: DISCONTINUED | OUTPATIENT
Start: 2023-06-06 | End: 2023-06-06 | Stop reason: SURG

## 2023-06-06 RX ORDER — HYDROCODONE BITARTRATE AND ACETAMINOPHEN 7.5; 325 MG/1; MG/1
2 TABLET ORAL EVERY 4 HOURS PRN
Status: DISCONTINUED | OUTPATIENT
Start: 2023-06-06 | End: 2023-06-07

## 2023-06-06 RX ORDER — HEPARIN SODIUM 5000 [USP'U]/ML
5000 INJECTION, SOLUTION INTRAVENOUS; SUBCUTANEOUS EVERY 12 HOURS SCHEDULED
Status: DISCONTINUED | OUTPATIENT
Start: 2023-06-07 | End: 2023-06-09 | Stop reason: HOSPADM

## 2023-06-06 RX ORDER — MORPHINE SULFATE 2 MG/ML
1 INJECTION, SOLUTION INTRAMUSCULAR; INTRAVENOUS EVERY 4 HOURS PRN
Status: DISCONTINUED | OUTPATIENT
Start: 2023-06-06 | End: 2023-06-09 | Stop reason: HOSPADM

## 2023-06-06 RX ORDER — METOPROLOL SUCCINATE 25 MG/1
25 TABLET, EXTENDED RELEASE ORAL DAILY
Status: DISCONTINUED | OUTPATIENT
Start: 2023-06-07 | End: 2023-06-09 | Stop reason: HOSPADM

## 2023-06-06 RX ORDER — POLYETHYLENE GLYCOL 3350 17 G/17G
17 POWDER, FOR SOLUTION ORAL DAILY PRN
Status: DISCONTINUED | OUTPATIENT
Start: 2023-06-06 | End: 2023-06-09 | Stop reason: HOSPADM

## 2023-06-06 RX ORDER — FAMOTIDINE 20 MG/1
20 TABLET, FILM COATED ORAL ONCE
Status: DISCONTINUED | OUTPATIENT
Start: 2023-06-06 | End: 2023-06-06 | Stop reason: HOSPADM

## 2023-06-06 RX ORDER — CEFAZOLIN SODIUM 2 G/100ML
2 INJECTION, SOLUTION INTRAVENOUS EVERY 8 HOURS
Status: COMPLETED | OUTPATIENT
Start: 2023-06-06 | End: 2023-06-07

## 2023-06-06 RX ORDER — HEPARIN SODIUM 5000 [USP'U]/ML
5000 INJECTION, SOLUTION INTRAVENOUS; SUBCUTANEOUS EVERY 8 HOURS SCHEDULED
Status: DISCONTINUED | OUTPATIENT
Start: 2023-06-06 | End: 2023-06-06 | Stop reason: HOSPADM

## 2023-06-06 RX ORDER — PROMETHAZINE HYDROCHLORIDE 25 MG/1
25 TABLET ORAL ONCE AS NEEDED
Status: DISCONTINUED | OUTPATIENT
Start: 2023-06-06 | End: 2023-06-06

## 2023-06-06 RX ORDER — ALUMINA, MAGNESIA, AND SIMETHICONE 2400; 2400; 240 MG/30ML; MG/30ML; MG/30ML
15 SUSPENSION ORAL EVERY 6 HOURS PRN
Status: DISCONTINUED | OUTPATIENT
Start: 2023-06-06 | End: 2023-06-09 | Stop reason: HOSPADM

## 2023-06-06 RX ORDER — DROPERIDOL 2.5 MG/ML
0.62 INJECTION, SOLUTION INTRAMUSCULAR; INTRAVENOUS
Status: DISCONTINUED | OUTPATIENT
Start: 2023-06-06 | End: 2023-06-06 | Stop reason: HOSPADM

## 2023-06-06 RX ORDER — KETOROLAC TROMETHAMINE 15 MG/ML
15 INJECTION, SOLUTION INTRAMUSCULAR; INTRAVENOUS EVERY 6 HOURS
Status: COMPLETED | OUTPATIENT
Start: 2023-06-06 | End: 2023-06-06

## 2023-06-06 RX ORDER — SODIUM CHLORIDE, SODIUM LACTATE, POTASSIUM CHLORIDE, CALCIUM CHLORIDE 600; 310; 30; 20 MG/100ML; MG/100ML; MG/100ML; MG/100ML
100 INJECTION, SOLUTION INTRAVENOUS CONTINUOUS
Status: DISCONTINUED | OUTPATIENT
Start: 2023-06-06 | End: 2023-06-09 | Stop reason: HOSPADM

## 2023-06-06 RX ORDER — MIDAZOLAM HYDROCHLORIDE 1 MG/ML
1 INJECTION INTRAMUSCULAR; INTRAVENOUS
Status: DISCONTINUED | OUTPATIENT
Start: 2023-06-06 | End: 2023-06-06 | Stop reason: HOSPADM

## 2023-06-06 RX ORDER — ROCURONIUM BROMIDE 10 MG/ML
INJECTION, SOLUTION INTRAVENOUS AS NEEDED
Status: DISCONTINUED | OUTPATIENT
Start: 2023-06-06 | End: 2023-06-06 | Stop reason: SURG

## 2023-06-06 RX ORDER — ONDANSETRON 2 MG/ML
4 INJECTION INTRAMUSCULAR; INTRAVENOUS EVERY 6 HOURS PRN
Status: DISCONTINUED | OUTPATIENT
Start: 2023-06-06 | End: 2023-06-09 | Stop reason: HOSPADM

## 2023-06-06 RX ORDER — PROPOFOL 10 MG/ML
VIAL (ML) INTRAVENOUS AS NEEDED
Status: DISCONTINUED | OUTPATIENT
Start: 2023-06-06 | End: 2023-06-06 | Stop reason: SURG

## 2023-06-06 RX ORDER — HYDRALAZINE HYDROCHLORIDE 20 MG/ML
5 INJECTION INTRAMUSCULAR; INTRAVENOUS
Status: DISCONTINUED | OUTPATIENT
Start: 2023-06-06 | End: 2023-06-06 | Stop reason: HOSPADM

## 2023-06-06 RX ADMIN — HYDROMORPHONE HYDROCHLORIDE 0.5 MG: 1 INJECTION, SOLUTION INTRAMUSCULAR; INTRAVENOUS; SUBCUTANEOUS at 10:48

## 2023-06-06 RX ADMIN — FENTANYL CITRATE 25 MCG: 0.05 INJECTION, SOLUTION INTRAMUSCULAR; INTRAVENOUS at 07:33

## 2023-06-06 RX ADMIN — FENTANYL CITRATE 25 MCG: 0.05 INJECTION, SOLUTION INTRAMUSCULAR; INTRAVENOUS at 09:43

## 2023-06-06 RX ADMIN — PROPOFOL 160 MG: 10 INJECTION, EMULSION INTRAVENOUS at 07:33

## 2023-06-06 RX ADMIN — Medication 0.5 MG: at 10:48

## 2023-06-06 RX ADMIN — EPHEDRINE SULFATE 10 MG: 50 INJECTION INTRAVENOUS at 08:09

## 2023-06-06 RX ADMIN — CEFAZOLIN SODIUM 2 G: 2 INJECTION, SOLUTION INTRAVENOUS at 07:38

## 2023-06-06 RX ADMIN — EPHEDRINE SULFATE 5 MG: 50 INJECTION INTRAVENOUS at 08:57

## 2023-06-06 RX ADMIN — LIDOCAINE HYDROCHLORIDE 0.5 ML: 10 INJECTION, SOLUTION EPIDURAL; INFILTRATION; INTRACAUDAL; PERINEURAL at 07:12

## 2023-06-06 RX ADMIN — TEMAZEPAM 15 MG: 15 CAPSULE ORAL at 20:21

## 2023-06-06 RX ADMIN — MORPHINE SULFATE 1 MG: 2 INJECTION, SOLUTION INTRAMUSCULAR; INTRAVENOUS at 16:16

## 2023-06-06 RX ADMIN — DIPHENHYDRAMINE HYDROCHLORIDE 25 MG: 25 CAPSULE ORAL at 20:21

## 2023-06-06 RX ADMIN — FENTANYL CITRATE 50 MCG: 50 INJECTION, SOLUTION INTRAMUSCULAR; INTRAVENOUS at 10:36

## 2023-06-06 RX ADMIN — Medication 0.5 MG: at 11:19

## 2023-06-06 RX ADMIN — CEFAZOLIN SODIUM 2 G: 2 INJECTION, SOLUTION INTRAVENOUS at 16:07

## 2023-06-06 RX ADMIN — SODIUM CHLORIDE, POTASSIUM CHLORIDE, SODIUM LACTATE AND CALCIUM CHLORIDE 9 ML/HR: 600; 310; 30; 20 INJECTION, SOLUTION INTRAVENOUS at 07:12

## 2023-06-06 RX ADMIN — GABAPENTIN 100 MG: 100 CAPSULE ORAL at 20:21

## 2023-06-06 RX ADMIN — KETOROLAC TROMETHAMINE 15 MG: 15 INJECTION, SOLUTION INTRAMUSCULAR; INTRAVENOUS at 15:41

## 2023-06-06 RX ADMIN — ROCURONIUM BROMIDE 40 MG: 50 INJECTION, SOLUTION INTRAVENOUS at 07:33

## 2023-06-06 RX ADMIN — CARIPRAZINE 1.5 MG: 1.5 CAPSULE, GELATIN COATED ORAL at 20:28

## 2023-06-06 RX ADMIN — FENTANYL CITRATE 25 MCG: 0.05 INJECTION, SOLUTION INTRAMUSCULAR; INTRAVENOUS at 09:03

## 2023-06-06 RX ADMIN — HYDROMORPHONE HYDROCHLORIDE 0.5 MG: 1 INJECTION, SOLUTION INTRAMUSCULAR; INTRAVENOUS; SUBCUTANEOUS at 11:19

## 2023-06-06 RX ADMIN — EPHEDRINE SULFATE 10 MG: 50 INJECTION INTRAVENOUS at 08:06

## 2023-06-06 RX ADMIN — ALBUMIN (HUMAN): 12.5 INJECTION, SOLUTION INTRAVENOUS at 09:02

## 2023-06-06 RX ADMIN — EPHEDRINE SULFATE 10 MG: 50 INJECTION INTRAVENOUS at 09:35

## 2023-06-06 RX ADMIN — SODIUM CHLORIDE, POTASSIUM CHLORIDE, SODIUM LACTATE AND CALCIUM CHLORIDE 100 ML/HR: 600; 310; 30; 20 INJECTION, SOLUTION INTRAVENOUS at 14:18

## 2023-06-06 RX ADMIN — GABAPENTIN 100 MG: 100 CAPSULE ORAL at 15:41

## 2023-06-06 RX ADMIN — DULOXETINE 60 MG: 60 CAPSULE, DELAYED RELEASE ORAL at 20:21

## 2023-06-06 RX ADMIN — LIDOCAINE HYDROCHLORIDE 40 MG: 10 INJECTION, SOLUTION EPIDURAL; INFILTRATION; INTRACAUDAL; PERINEURAL at 07:33

## 2023-06-06 RX ADMIN — PROPOFOL 25 MCG/KG/MIN: 10 INJECTION, EMULSION INTRAVENOUS at 07:40

## 2023-06-06 RX ADMIN — TOPIRAMATE 25 MG: 25 CAPSULE, COATED PELLETS ORAL at 20:29

## 2023-06-06 RX ADMIN — TRAMADOL HYDROCHLORIDE 50 MG: 50 TABLET, COATED ORAL at 14:30

## 2023-06-06 RX ADMIN — HYDROCODONE BITARTRATE AND ACETAMINOPHEN 2 TABLET: 7.5; 325 TABLET ORAL at 18:02

## 2023-06-06 RX ADMIN — PANTOPRAZOLE SODIUM 40 MG: 40 TABLET, DELAYED RELEASE ORAL at 17:56

## 2023-06-06 RX ADMIN — SUGAMMADEX 150 MG: 100 INJECTION, SOLUTION INTRAVENOUS at 09:45

## 2023-06-06 RX ADMIN — ROCURONIUM BROMIDE 10 MG: 50 INJECTION, SOLUTION INTRAVENOUS at 08:33

## 2023-06-06 RX ADMIN — DEXAMETHASONE SODIUM PHOSPHATE 8 MG: 4 INJECTION INTRA-ARTICULAR; INTRALESIONAL; INTRAMUSCULAR; INTRAVENOUS; SOFT TISSUE at 07:33

## 2023-06-06 RX ADMIN — EPHEDRINE SULFATE 10 MG: 50 INJECTION INTRAVENOUS at 08:50

## 2023-06-06 RX ADMIN — FENTANYL CITRATE 25 MCG: 0.05 INJECTION, SOLUTION INTRAMUSCULAR; INTRAVENOUS at 07:49

## 2023-06-06 RX ADMIN — ROCURONIUM BROMIDE 10 MG: 50 INJECTION, SOLUTION INTRAVENOUS at 09:04

## 2023-06-06 RX ADMIN — EPHEDRINE SULFATE 10 MG: 50 INJECTION INTRAVENOUS at 08:55

## 2023-06-06 RX ADMIN — EPHEDRINE SULFATE 5 MG: 50 INJECTION INTRAVENOUS at 08:12

## 2023-06-06 RX ADMIN — ONDANSETRON 4 MG: 2 INJECTION INTRAMUSCULAR; INTRAVENOUS at 09:37

## 2023-06-06 RX ADMIN — KETOROLAC TROMETHAMINE 15 MG: 15 INJECTION, SOLUTION INTRAMUSCULAR; INTRAVENOUS at 21:12

## 2023-06-06 NOTE — ANESTHESIA PREPROCEDURE EVALUATION
Anesthesia Evaluation     Patient summary reviewed and Nursing notes reviewed                Airway   Mallampati: II  TM distance: >3 FB  Neck ROM: full  No difficulty expected  Dental - normal exam     Pulmonary - negative pulmonary ROS and normal exam   Cardiovascular - normal exam    ECG reviewed    (+) hyperlipidemia    ROS comment:   POTS    Neuro/Psych- negative ROS  GI/Hepatic/Renal/Endo    (+) GERD    Musculoskeletal (-) negative ROS    Abdominal  - normal exam    Bowel sounds: normal.   Substance History - negative use     OB/GYN negative ob/gyn ROS         Other   autoimmune disease Sjogren syndrome,                     Anesthesia Plan    ASA 2     general     intravenous induction     Anesthetic plan, risks, benefits, and alternatives have been provided, discussed and informed consent has been obtained with: patient.    Plan discussed with CRNA.    CODE STATUS:

## 2023-06-06 NOTE — ANESTHESIA PROCEDURE NOTES
Airway  Urgency: elective    Date/Time: 6/6/2023 7:35 AM  Airway not difficult    General Information and Staff    Patient location during procedure: OR  CRNA/CAA: Rudy Blankenship CRNA    Indications and Patient Condition  Indications for airway management: airway protection    Preoxygenated: yes  MILS not maintained throughout  Mask difficulty assessment: 1 - vent by mask    Final Airway Details  Final airway type: endotracheal airway      Successful airway: ETT  Cuffed: yes   Successful intubation technique: video laryngoscopy (Elective VL)  Endotracheal tube insertion site: oral  Blade: Xie  Blade size: 3  ETT size (mm): 7.0  Cormack-Lehane Classification: grade I - full view of glottis  Placement verified by: chest auscultation and capnometry   Cuff volume (mL): 5  Measured from: lips  ETT/EBT  to lips (cm): 20  Number of attempts at approach: 1  Assessment: lips, teeth, and gum same as pre-op and atraumatic intubation    Additional Comments  Negative epigastric sounds, Breath sound equal bilaterally with symmetric chest rise and fall

## 2023-06-06 NOTE — ANESTHESIA POSTPROCEDURE EVALUATION
Patient: Silvia Tracy    Procedure Summary       Date: 06/06/23 Room / Location:  [facility] [address] [place] [address] [facility]    Anesthesia Start: 0728 Anesthesia Stop: 0957    Procedure: TOTAL LAPAROSCOPIC HYSTERECTOMY BILATERAL SALPINGOOPHORECTOMY WITH DAVINCI ROBOT, CYSTOSCOPY; TENSION FREE VAGINAL TAPING SUBURETHRAL SLING (Abdomen) Diagnosis:     Surgeons: Delmy Kelly MD Provider: Arley Pearson MD    Anesthesia Type: general ASA Status: 2            Anesthesia Type: general    Vitals  Vitals Value Taken Time   BP 82/46 06/06/23 0955   Temp     Pulse 70 06/06/23 0957   Resp     SpO2 93 % 06/06/23 0957   Vitals shown include unvalidated device data.        Post Anesthesia Care and Evaluation    Patient location during evaluation: PACU  Patient participation: complete - patient participated  Level of consciousness: sleepy but conscious  Pain management: adequate    Airway patency: patent  Anesthetic complications: No anesthetic complications  PONV Status: none  Cardiovascular status: hemodynamically stable and acceptable  Respiratory status: nonlabored ventilation, acceptable, nasal cannula and oral airway  Hydration status: acceptable

## 2023-06-06 NOTE — PROGRESS NOTES
Pt with adenomyosis , trial ablation caused too much cramping and pain. So she wanted to proceed wth definitive surgical management by complete / total hysterectomy and BSO.  Also TVT for KIARA.  Risk of ovarian removal and hormonal / mental effects given. She would like to prophylactically remove the ovaries due to family h of menopause and the fact she is already so close to menopause.  Also for the TVT risk of mesh, perforation. Other complications discussed.  Pt agree and consents.

## 2023-06-06 NOTE — OP NOTE
GYN Operative Note    Patient Name, age and sex:  Silvia Tracy, 48 y.o., female  Procedure Date:  6/6/2023    Surgeon(s) and Role:     * Delmy Kelly MD - Primary    Additional Staff:   Kade KEN resident Medically necessary for assistance.Complex retraction. Suturing skills.Complex and critical patient postioning.  Laparoscopic instrument use and manipulation.     * No Diagnosis Codes entered *    PREOP DX:  Adenonyosis/ pelvic pain, family hx of ovarian cancer, STRESS INCONTINENCE    POSTOP DX: same    * No Diagnosis Codes entered *    Procedure(s):  TOTAL LAPAROSCOPIC HYSTERECTOMY BILATERAL SALPINGOOPHORECTOMY WITH DAVINCI ROBOT, CYSTOSCOPY; TENSION FREE VAGINAL TAPING SUBURETHRAL SLING    Indications for Operation: Patient is a 48-year-old female who has been having very much problems with dysmenorrhea and pelvic pain and heavy menstrual flow.  She underwent a NovaSure ablation which helped decrease over the flow but she continued to have a significant mount of cramping and pain.  She had thought back to her family history of ovarian cancer and wanted to just proceed on with definitive therapy by hysterectomy since the ablation did not work.  She did not feel comfortable using hormone therapy anymore which was not working anyway.  So we went through the full risk of ovarian removal and uterine removal including the surgical risk but also the hormonal risk.  Worsening of psychiatric depression weight gain hair loss and lots of other menopause symptoms.  Patient was willing to undergo those as she says she is already having a significant amount of those issues without being in full menopause.  Also she had the stress urinary continence and wanted to proceed on with surgical management for that so we did the work-up confirmed the urethral hypermobility and stress incontinence.  The risk of the TVT specifically was thoroughly reviewed including unsatisfactory stools but also injury from  placement bleeding hematomas and mesh complications.    Antibiotics:  cefazolin (Ancef) ordered on call to OR    Anesthesia:  Type: General -   ASA:  II    Operative Findings: The uterus was mildly enlarged somewhat boggy.  There was no significant prolapse.  There was a cyst on the right ovary that was not an endometrioma.  There was no significant scar tissue except for some  adhesions of the bladder to the lower uterine segment and cervix.  The bladder was normal by cystoscopy and each ureteral orifice had clear urine E flux with no sign of injury or obstruction to the ureters.  There was also no perforation of the bladder with the TVT placement as there was noted to have the hypermobile urethra causing the stress incontinence.  Significant cystocele rectocele or vaginal prolapse was noted.  She did not have any abdominal wall adhesions from her prior midline .    Specimens Removed:    Specimens       ID Source Type Tests Collected By Collected At Frozen?    A Uterus with Cervix, Bilateral Tubes and Ovaries Tissue TISSUE PATHOLOGY EXAM   Delmy Kelly MD 23 0841     This specimen was not marked as sent.            Estimated Blood Loss: 100 mL    Urine Output: 300 mL    Complications: None    Procedure Narrative: Patient was taken the operating room and general anesthesia was found to be adequate.  She was then prepped and draped normal sterile fashion dorsolithotomy position in the yellowfin stirrups.  The three-way Ibarra catheter was placed and a 3 cm Sri Nohemy ring with an 8 cm tip was placed into the uterus for manipulation.  Attention was then turned the abdomen sterile gloves.  The subumbilical incision was then injected with local.  The abdominal wall was tented up and the varies needle was placed.  I confirmed placement the drop saline test and low patient pressure CO2.  Then the pneumoperitoneum was created.  The optical trocar was then used into the abdominal cavity under  direct visualization.  The patient was then placed in Trendelenburg.  The robotic ports were then placed next the 8 mm trocar on the right and left lateral abdomen lateral to the umbilical trocar and a 10 mm assist port in the right upper quadrant under direct visualization.  At 25 degrees Trendelenburg the robot was then docked and instruments were placed.  The survey of the pelvis was performed no significant adhesions other than the bladder flap from the .  No abdominal wall adhesions were seen.  Then the course the ureters were identified.  The IP ligaments coagulated transected using the Maryland bipolar and scissors.  This carried past the level of the round ligament on each side.  The round ligament was coagulated and transected and the anterior posterior peritoneum was  and the bladder flap was then created.  The monopolar scissors used to dissect away the adhesions from the prior  layer by layer to avoid injury to the bladder.  Once the bladder was fully dissected below the level of the Koh ring then the uterine vessels were skeletonized bilaterally transected and entry into the cuff was performed with the monopolar scissors.  Once the cervix was removed from the vaginal cuff then the cervix with its attached uterus bilateral fallopian tubes and ovaries were then removed through the vagina and sent to pathology intact.  The cuff was then closed with interrupted 0 Vicryl figure-of-eight sutures at each cuff angle and then a running 0 strata fix suture in 2 layers in the standard fashion.  Hemostasis was noted copious irrigation was then performed suctioned dry and then the instruments removed robot was undocked and attention was then turned to the cystoscopy and the TVT for later closure of the abdominal ports.  With patient back in 90 degree lithotomy and out of Trendelenburg the pneumoperitoneum gas was released.  The Ibarra catheter had been placed and then removed to do a  cystoscopy.  The bladder was filled with 300 cc sterile saline by the cystoscope and the bladder itself was normal intact no defects and sutures.  Each ureteral orifice noted to have urine streaming normally with no obstruction.  Then once the cystoscopy confirmed normal after the hysterectomy then the Ibarra was replaced again to empty the bladder for the TVT.  The suburethral mucosa was grasped with Allis clamps.  The injected with a local solution.  A 1.5 cm incision was made in the mid urethral plane using the scalpel.  The Metzenbaums used to dissect towards each retropubic transobturator angle.  The winged guide was then placed.  The helical trocar was then placed in the standard fashion making sure to hug the pubic bone of the obturator space.  The patient's left was placed first and then the right the tension of the TVT was then adjusted and the plastic sheathing removed.  The full cystoscopy was then repeated in the standard fashion and noted to have no perforation.  Then the Ibarra catheter was replaced just into the bladder and then the Floseal was placed into the incision and hemostasis was confirmed and closure with a 3-0 running locking Vicryl suture of the vaginal mucosa opening.  The skin openings on the transobturator space were closed with a skin afix.  Cuff was copiously irrigated and suctioned dry no active bleeding from the vaginal cuff or from the TVT closure.  Then with sterile gloves changed to the abdominal closure.  Repeat laparoscopy was performed copious irrigation and suctioning dry hemostasis confirmed under low CO2 pressure.  Then the 10 mm trocar was removed the Porter Ulrich closure with 0 Vicryl was performed laparoscopically.  This was sutured closed.  Hemostasis confirmed all trocar sites were all removed.  CO2 gas released.  The patient was taken out of Trendelenburg.  Final count was correct after final closure with subcuticular Monocryl and skin afix.  After final count correct  she was then taken a lithotomy position awoken from general anesthesia and taken to recovery in stable condition.    Delmy Kelly MD - 6/6/2023, 09:59 EDT

## 2023-06-06 NOTE — PLAN OF CARE
Goal Outcome Evaluation:      Pt transported to floor at 1230 from PACU following an operation by Dr. Kelly. Pt has 4 lap sites across abdomen that are CDI and MILADY. Pt has voided appropriately and pain well controlled with PRN medication.

## 2023-06-07 PROBLEM — G90.A POTS (POSTURAL ORTHOSTATIC TACHYCARDIA SYNDROME): Status: ACTIVE | Noted: 2023-06-07

## 2023-06-07 PROBLEM — M79.7 FIBROMYALGIA: Status: ACTIVE | Noted: 2023-06-07

## 2023-06-07 PROBLEM — Z90.710 S/P HYSTERECTOMY: Status: ACTIVE | Noted: 2023-06-07

## 2023-06-07 LAB
ANION GAP SERPL CALCULATED.3IONS-SCNC: 7 MMOL/L (ref 5–15)
BUN SERPL-MCNC: 8 MG/DL (ref 6–20)
BUN/CREAT SERPL: 9.9 (ref 7–25)
CALCIUM SPEC-SCNC: 8.4 MG/DL (ref 8.6–10.5)
CHLORIDE SERPL-SCNC: 110 MMOL/L (ref 98–107)
CO2 SERPL-SCNC: 24 MMOL/L (ref 22–29)
CREAT SERPL-MCNC: 0.81 MG/DL (ref 0.57–1)
CYTO UR: NORMAL
DEPRECATED RDW RBC AUTO: 48.2 FL (ref 37–54)
EGFRCR SERPLBLD CKD-EPI 2021: 88.6 ML/MIN/1.73
ERYTHROCYTE [DISTWIDTH] IN BLOOD BY AUTOMATED COUNT: 13.1 % (ref 12.3–15.4)
GLUCOSE SERPL-MCNC: 88 MG/DL (ref 65–99)
HCT VFR BLD AUTO: 32.6 % (ref 34–46.6)
HGB BLD-MCNC: 10.5 G/DL (ref 12–15.9)
LAB AP CASE REPORT: NORMAL
LAB AP CLINICAL INFORMATION: NORMAL
MCH RBC QN AUTO: 32.4 PG (ref 26.6–33)
MCHC RBC AUTO-ENTMCNC: 32.2 G/DL (ref 31.5–35.7)
MCV RBC AUTO: 100.6 FL (ref 79–97)
PATH REPORT.FINAL DX SPEC: NORMAL
PATH REPORT.GROSS SPEC: NORMAL
PLATELET # BLD AUTO: 205 10*3/MM3 (ref 140–450)
PMV BLD AUTO: 10.4 FL (ref 6–12)
POTASSIUM SERPL-SCNC: 4.1 MMOL/L (ref 3.5–5.2)
RBC # BLD AUTO: 3.24 10*6/MM3 (ref 3.77–5.28)
SODIUM SERPL-SCNC: 141 MMOL/L (ref 136–145)
WBC NRBC COR # BLD: 9.12 10*3/MM3 (ref 3.4–10.8)

## 2023-06-07 PROCEDURE — 80048 BASIC METABOLIC PNL TOTAL CA: CPT | Performed by: OBSTETRICS & GYNECOLOGY

## 2023-06-07 PROCEDURE — 85027 COMPLETE CBC AUTOMATED: CPT | Performed by: OBSTETRICS & GYNECOLOGY

## 2023-06-07 PROCEDURE — 25010000002 HEPARIN (PORCINE) PER 1000 UNITS: Performed by: OBSTETRICS & GYNECOLOGY

## 2023-06-07 PROCEDURE — 25010000002 MORPHINE PER 10 MG: Performed by: OBSTETRICS & GYNECOLOGY

## 2023-06-07 PROCEDURE — 63710000001 DIPHENHYDRAMINE PER 50 MG: Performed by: OBSTETRICS & GYNECOLOGY

## 2023-06-07 PROCEDURE — 25010000002 CEFAZOLIN IN DEXTROSE 2-4 GM/100ML-% SOLUTION: Performed by: OBSTETRICS & GYNECOLOGY

## 2023-06-07 RX ORDER — IBUPROFEN 800 MG/1
800 TABLET ORAL ONCE
Status: COMPLETED | OUTPATIENT
Start: 2023-06-07 | End: 2023-06-07

## 2023-06-07 RX ORDER — OXYCODONE HYDROCHLORIDE 10 MG/1
10 TABLET ORAL EVERY 4 HOURS PRN
Status: DISCONTINUED | OUTPATIENT
Start: 2023-06-07 | End: 2023-06-09 | Stop reason: HOSPADM

## 2023-06-07 RX ORDER — IBUPROFEN 800 MG/1
800 TABLET ORAL EVERY 6 HOURS PRN
Status: DISCONTINUED | OUTPATIENT
Start: 2023-06-07 | End: 2023-06-07

## 2023-06-07 RX ORDER — IBUPROFEN 800 MG/1
800 TABLET ORAL EVERY 8 HOURS SCHEDULED
Status: DISCONTINUED | OUTPATIENT
Start: 2023-06-07 | End: 2023-06-09 | Stop reason: HOSPADM

## 2023-06-07 RX ADMIN — GABAPENTIN 100 MG: 100 CAPSULE ORAL at 15:30

## 2023-06-07 RX ADMIN — SODIUM CHLORIDE 1000 ML: 9 INJECTION, SOLUTION INTRAVENOUS at 12:09

## 2023-06-07 RX ADMIN — TOPIRAMATE 25 MG: 25 CAPSULE, COATED PELLETS ORAL at 09:01

## 2023-06-07 RX ADMIN — OXYCODONE HYDROCHLORIDE 10 MG: 10 TABLET ORAL at 09:01

## 2023-06-07 RX ADMIN — GABAPENTIN 100 MG: 100 CAPSULE ORAL at 20:12

## 2023-06-07 RX ADMIN — OXYCODONE HYDROCHLORIDE 10 MG: 10 TABLET ORAL at 12:33

## 2023-06-07 RX ADMIN — DIPHENHYDRAMINE HYDROCHLORIDE 25 MG: 25 CAPSULE ORAL at 20:11

## 2023-06-07 RX ADMIN — DULOXETINE 60 MG: 60 CAPSULE, DELAYED RELEASE ORAL at 20:11

## 2023-06-07 RX ADMIN — HEPARIN SODIUM 5000 UNITS: 5000 INJECTION INTRAVENOUS; SUBCUTANEOUS at 09:01

## 2023-06-07 RX ADMIN — IVABRADINE 7.5 MG: 5 TABLET, FILM COATED ORAL at 07:54

## 2023-06-07 RX ADMIN — IBUPROFEN 800 MG: 800 TABLET, FILM COATED ORAL at 09:01

## 2023-06-07 RX ADMIN — ESTRADIOL 1 PATCH: 0.1 PATCH TRANSDERMAL at 09:00

## 2023-06-07 RX ADMIN — CARIPRAZINE 1.5 MG: 1.5 CAPSULE, GELATIN COATED ORAL at 09:01

## 2023-06-07 RX ADMIN — PANTOPRAZOLE SODIUM 40 MG: 40 TABLET, DELAYED RELEASE ORAL at 17:14

## 2023-06-07 RX ADMIN — SODIUM CHLORIDE, POTASSIUM CHLORIDE, SODIUM LACTATE AND CALCIUM CHLORIDE 100 ML/HR: 600; 310; 30; 20 INJECTION, SOLUTION INTRAVENOUS at 02:16

## 2023-06-07 RX ADMIN — IVABRADINE 7.5 MG: 5 TABLET, FILM COATED ORAL at 17:14

## 2023-06-07 RX ADMIN — DULOXETINE 60 MG: 60 CAPSULE, DELAYED RELEASE ORAL at 09:01

## 2023-06-07 RX ADMIN — GABAPENTIN 100 MG: 100 CAPSULE ORAL at 09:01

## 2023-06-07 RX ADMIN — PANTOPRAZOLE SODIUM 40 MG: 40 TABLET, DELAYED RELEASE ORAL at 07:54

## 2023-06-07 RX ADMIN — HYDROCODONE BITARTRATE AND ACETAMINOPHEN 2 TABLET: 7.5; 325 TABLET ORAL at 04:11

## 2023-06-07 RX ADMIN — OXYCODONE HYDROCHLORIDE 10 MG: 10 TABLET ORAL at 17:14

## 2023-06-07 RX ADMIN — MORPHINE SULFATE 1 MG: 2 INJECTION, SOLUTION INTRAMUSCULAR; INTRAVENOUS at 07:00

## 2023-06-07 RX ADMIN — IBUPROFEN 800 MG: 800 TABLET, FILM COATED ORAL at 20:11

## 2023-06-07 RX ADMIN — CEFAZOLIN SODIUM 2 G: 2 INJECTION, SOLUTION INTRAVENOUS at 00:48

## 2023-06-07 RX ADMIN — HEPARIN SODIUM 5000 UNITS: 5000 INJECTION INTRAVENOUS; SUBCUTANEOUS at 20:11

## 2023-06-07 RX ADMIN — IBUPROFEN 800 MG: 800 TABLET, FILM COATED ORAL at 15:30

## 2023-06-07 RX ADMIN — DIPHENHYDRAMINE HYDROCHLORIDE 25 MG: 25 CAPSULE ORAL at 07:54

## 2023-06-07 RX ADMIN — TEMAZEPAM 15 MG: 15 CAPSULE ORAL at 20:17

## 2023-06-07 NOTE — PLAN OF CARE
Goal Outcome Evaluation:  Plan of Care Reviewed With: patient        Progress: improving  Outcome Evaluation: pain managed with PRN oxy, up with stand by,  at bedside, saline locked, possibly home tomorrow

## 2023-06-07 NOTE — PLAN OF CARE
Problem: Adult Inpatient Plan of Care  Goal: Absence of Hospital-Acquired Illness or Injury  Intervention: Prevent Infection  Recent Flowsheet Documentation  Taken 6/7/2023 0411 by Emili Gould RN  Infection Prevention: rest/sleep promoted  Taken 6/7/2023 0200 by Emili Gould RN  Infection Prevention: rest/sleep promoted  Taken 6/7/2023 0000 by Emili Gould RN  Infection Prevention: rest/sleep promoted  Taken 6/6/2023 2200 by Emili Gould RN  Infection Prevention: rest/sleep promoted  Taken 6/6/2023 2020 by Emili Gould RN  Infection Prevention: rest/sleep promoted     Problem: Adult Inpatient Plan of Care  Goal: Absence of Hospital-Acquired Illness or Injury  Intervention: Prevent and Manage VTE (Venous Thromboembolism) Risk  Recent Flowsheet Documentation  Taken 6/7/2023 0411 by Emili Gould RN  Activity Management: activity encouraged  Taken 6/7/2023 0200 by Emili Gould RN  Activity Management: activity encouraged  Taken 6/7/2023 0000 by Emili Gould RN  Activity Management: activity encouraged  Taken 6/6/2023 2200 by Emili Gould RN  Activity Management: activity encouraged  Taken 6/6/2023 2020 by Emili Gould RN  Activity Management:   activity encouraged   ambulated to bathroom  VTE Prevention/Management: patient refused intervention   Goal Outcome Evaluation:   Pt is A&OX4 with VSS (baseline low BP's). She has rested intermittently requiring only one PRN pain medication. Good UOP and lap sites C/D/I

## 2023-06-07 NOTE — CONSULTS
Patient Name: Silvia Tracy  MRN: [id number]  : 1975  DOS: 2023    Attending: Delmy Kelly,*    Primary Care Provider: Genie You MD      Reason for consultation:  Medical management    Subjective   Patient is a 48 y.o. female presented for scheduled surgery by Dr. Kelly. She underwent THANH BSO under [facility]. She tolerated surgery well and was admitted for further medical management.    She has history of POTS and fibromyalgia.  She states these are exacerbated when her body is under stress.  She had 1 episode yesterday at home when ambulating to the bathroom of near syncope.  States she had difficulty during the night controlling her pain as well.    When seen this morning she is feeling better. She states she has been up moving around in her room this morning and has not had any dizziness or lightheadedness.  Reports she has been able to control her pain with oral pain medication today.  She denies any nausea, vomiting, shortness of breath or chest pain.    Allergies:  Allergies   Allergen Reactions    Erenumab-Aooe Swelling     Aimovig    Influenza Virus Vaccine Other (See Comments)      Guillain-Barré    Wasp Venom Swelling       Meds:  Medications Prior to Admission   Medication Sig Dispense Refill Last Dose    acetaminophen (TYLENOL) 500 MG tablet Take 2 tablets by mouth Every 6 (Six) Hours As Needed for Moderate Pain.   2023    atomoxetine (STRATTERA) 80 MG capsule Take 1 capsule by mouth Daily.   2023 at 0445    Cariprazine HCl (VRAYLAR PO) Take 1 tablet by mouth Daily. 1.5 mg   2023    Cholecalciferol (Vitamin D3) 1.25 MG (80399 UT) capsule Vitamin D3   2023 at 0445    dexlansoprazole (DEXILANT) 60 MG capsule Take 1 capsule by mouth Daily.   2023 at 0445    DULoxetine (CYMBALTA) 60 MG capsule Take 1 capsule by mouth Every 12 (Twelve) Hours.   2023 at 0445    hydroxychloroquine (PLAQUENIL) 200 MG tablet Take 1 tablet by mouth Daily.   2023 at  21052    ibuprofen (ADVIL,MOTRIN) 600 MG tablet Take 1 tablet by mouth Every 6 (Six) Hours As Needed for Mild Pain.   6/6/2023 at 0445    ivabradine HCl (CORLANOR) 7.5 MG tablet tablet Take 1 tablet by mouth 2 (Two) Times a Day With Meals.   6/6/2023 at 0445    Lifitegrast (XIIDRA) 5 % ophthalmic solution Administer 1 drop to both eyes 2 (Two) Times a Day.   6/6/2023 at 0445    linaclotide (LINZESS) 145 MCG capsule capsule Take 1 capsule by mouth Every Morning Before Breakfast.   6/5/2023    metoprolol succinate XL (TOPROL-XL) 25 MG 24 hr tablet Take 1 tablet by mouth Daily.   6/6/2023 at 0445    Northera 100 MG capsule 3 (Three) Times a Day. 18 pills a day   6/6/2023 at 0445    promethazine (PHENERGAN) 25 MG tablet Take 1 tablet by mouth Every 6 (Six) Hours As Needed.   6/5/2023 at 1700    temazepam (RESTORIL) 7.5 MG capsule Take 1 capsule by mouth At Night As Needed for Sleep.   6/5/2023    Topiramate  MG capsule sustained-release 24 hr Take 1 capsule by mouth Daily.   6/5/2023    traMADol ER (ULTRAM-ER) 200 MG 24 hr tablet Take 1 tablet by mouth Daily As Needed for Moderate Pain.   Past Week    vitamin B-12 (CYANOCOBALAMIN) 500 MCG tablet Take 1 tablet by mouth Daily.   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    History of transfusion     PER PT, RECEIVED SEVERAL UNITS AT [facility], NO REACTION; RECEIVED 2 UNITS AT  HEMATOLOGY CLINIC, NO REACTION    History of vertebral fracture (2/2016) 10/27/2016    Hypotension     IBS (irritable bowel syndrome)     Irregular heart rhythm     Migraine without aura and without status migrainosus, not intractable 10/28/2016    Numbness and tingling in  "both hands     Numbness and tingling of both feet 2018    Numbness and tingling of left leg     POTS (postural orthostatic tachycardia syndrome)     PTSD (post-traumatic stress disorder)     Sjogren's disease     SOBOE (shortness of breath on exertion)     Spinal headache 2019    spinal patch needed    Tachycardia     Tinnitus     Wears glasses      Past Surgical History:   Procedure Laterality Date    BREAST SURGERY Right     bx- marker in place still     SECTION      CHOLECYSTECTOMY N/A 2020    Procedure: CHOLECYSTECTOMY LAPAROSCOPIC;  Surgeon: Jonah Diop MD;  Location:  [facility];  Service: General;  Laterality: N/A;    COLONOSCOPY      D & C HYSTEROSCOPY ENDOMETRIAL ABLATION N/A 2023    Procedure: HYSTEROSCOPY WITH NOVASURE ENDOMETRIAL ABLATION;  Surgeon: Delmy Kelly MD;  Location:  [facility];  Service: Gynecology;  Laterality: N/A;    ENDOSCOPY      TEMPOROMANDIBULAR JOINT SURGERY      TOTAL LAPAROSCOPIC HYSTERECTOMY WITH TRANSVAGINAL TAPING N/A 2023    Procedure: TOTAL LAPAROSCOPIC HYSTERECTOMY BILATERAL SALPINGOOPHORECTOMY WITH DAVINCI ROBOT, CYSTOSCOPY, TENSION FREE VAGINAL TAPING SUBURETHRAL SLING;  Surgeon: Delmy Kelly MD;  Location:  [facility];  Service: Robotics - DaVinci;  Laterality: N/A;    TUBAL ABDOMINAL LIGATION       Family History   Problem Relation Age of Onset    Scleroderma Mother     Coronary artery disease Father     COPD Father      Social History     Tobacco Use    Smoking status: Never    Smokeless tobacco: Never   Vaping Use    Vaping Use: Never used   Substance Use Topics    Alcohol use: Yes     Comment: occasional glass of wine     Drug use: Never       Review of Systems  Pertinent items are noted in HPI    Vital Signs  Visit Vitals  BP 95/59 (BP Location: Right arm, Patient Position: Lying)   Pulse 92   Temp 97.5 °F (36.4 °C) (Oral)   Resp 17   Ht 167.6 cm (66\")   Wt 72.1 kg (159 lb)   SpO2 94%   BMI 25.66 kg/m² "       Physical Exam:    General Appearance:    Alert, cooperative, in no acute distress   Head:    Normocephalic, without obvious abnormality, atraumatic   Eyes:            Lids and lashes normal, conjunctivae and sclerae normal, no   icterus, no pallor, corneas clear, PERRLA   Ears:    Ears appear intact with no abnormalities noted   Neck:   No adenopathy, supple, trachea midline, no thyromegaly, no     carotid bruit, no JVD   Lungs:     Clear to auscultation,respirations regular, even and                   unlabored    Heart:    Regular rhythm and normal rate, normal S1 and S2, no            murmur, no gallop, no rub, no click   Abdomen:   Soft, expected tenderness.  Lap sites CDI   Genitalia:    Deferred   Extremities:   Moves all extremities well, no edema, no cyanosis, no              redness   Pulses:   Pulses palpable and equal bilaterally   Skin:   No bleeding, bruising or rash   Neurologic:   Cranial nerves 2 - 12 grossly intact, sensation intact      I reviewed the patient's new clinical results.       Results from last 7 days   Lab Units 06/07/23  0611 06/05/23  1229   WBC 10*3/mm3 9.12 6.71   HEMOGLOBIN g/dL 10.5* 13.1   HEMATOCRIT % 32.6* 41.0   PLATELETS 10*3/mm3 205 260     Results from last 7 days   Lab Units 06/07/23  0611 06/05/23  1229   SODIUM mmol/L 141 139   POTASSIUM mmol/L 4.1 4.4   CHLORIDE mmol/L 110* 110*   CO2 mmol/L 24.0 19.0*   BUN mg/dL 8 11   CREATININE mg/dL 0.81 0.84   CALCIUM mg/dL 8.4* 9.1   GLUCOSE mg/dL 88 127*       Assessment and Plan:     S/P hysterectomy    POTS (postural orthostatic tachycardia syndrome)    Fibromyalgia      Recommendations:     1. Early ambulation post op  2. Pain control-prns   3. IS-encourage  4. DVT proph- mechs/heparin  5. Bowel regimen  6. Resume home medications per Dr. Kelly  7. Monitor post-op labs    Will give a 1 L IV fluid bolus over 3 hours.      AISHA Fox  06/07/23  10:16 EDT

## 2023-06-07 NOTE — PROGRESS NOTES
"Surgery Post-op Note  [place]    * No Diagnosis Codes entered *    * No Diagnosis Codes entered *    Procedure(s):  TOTAL LAPAROSCOPIC HYSTERECTOMY BILATERAL SALPINGOOPHORECTOMY WITH DAVINCI ROBOT, CYSTOSCOPY; TENSION FREE VAGINAL TAPING SUBURETHRAL SLING    Subjective     Pt  feeling bad today.  Had episodes of MANCERA that seemed normal to her but she just had a \"rough night\" No chest pain no SOB.  She complains of the pain.  Having to take the breakthrough morphine.  No nausea vomiting. Not really eating anything. Pt with chronic fibromyalgiia and this seeming to bother her as well.      Objective   Physical Exam  Vitals:    06/07/23 0700   BP: 95/59   Pulse: 92   Resp: 17   Temp: 97.5 °F (36.4 °C)   SpO2: 94%     General: awake, alert, comfortable    Pulm: CTAB    CVS: no M/R/G, reg rate    Abd: soft, NT, ND, NABS incision c/d/i    Ext: warm, well perfused      Labs:  Lab Results (last 24 hours)       Procedure Component Value Units Date/Time    Basic Metabolic Panel [[id number]]  (Abnormal) Collected: 06/07/23 0611    Specimen: Blood Updated: 06/07/23 0654     Glucose 88 mg/dL      BUN 8 mg/dL      Creatinine 0.81 mg/dL      Sodium 141 mmol/L      Potassium 4.1 mmol/L      Chloride 110 mmol/L      CO2 24.0 mmol/L      Calcium 8.4 mg/dL      BUN/Creatinine Ratio 9.9     Anion Gap 7.0 mmol/L      eGFR 88.6 mL/min/1.73     Narrative:      GFR Normal >60  Chronic Kidney Disease <60  Kidney Failure <15      CBC (No Diff) [[id number]]  (Abnormal) Collected: 06/07/23 0611    Specimen: Blood Updated: 06/07/23 0645     WBC 9.12 10*3/mm3      RBC 3.24 10*6/mm3      Hemoglobin 10.5 g/dL      Hematocrit 32.6 %      .6 fL      MCH 32.4 pg      MCHC 32.2 g/dL      RDW 13.1 %      RDW-SD 48.2 fl      MPV 10.4 fL      Platelets 205 10*3/mm3     Tissue Pathology Exam [[id number]] Collected: 06/06/23 0841    Specimen: Tissue from Uterus with Cervix, Bilateral Tubes and Ovaries Updated: 06/06/23 1032                Intake and " "Output:    Intake/Output Summary (Last 24 hours) at 6/7/2023 0802  Last data filed at 6/6/2023 1700  Gross per 24 hour   Intake 1870 ml   Output 850 ml   Net 1020 ml       Assessment     The patient is a 48 y.o. female 1 Day Post-Op after robotic hysterectomy with BSO    Plan     Will have her stay another day and get hospitalist consult for her complicated medical conditions. See if all things are optimized in the medical terms.   Will get better control of the pain.  D/c tramadol.  \"Not working\" per patient so will do ibuprofen TID scheduled and the hydrocodone 10 mg every 4 hours.   Physical therapy consult for ambulation due to her fibromyalgia.   Post surgical findings discussed and advanced care as all things surgical look great!    Delmy Kelly MD  06/07/23  08:02 EDT          "

## 2023-06-08 PROCEDURE — 63710000001 DIPHENHYDRAMINE PER 50 MG: Performed by: OBSTETRICS & GYNECOLOGY

## 2023-06-08 PROCEDURE — 25010000002 HEPARIN (PORCINE) PER 1000 UNITS: Performed by: OBSTETRICS & GYNECOLOGY

## 2023-06-08 PROCEDURE — 97161 PT EVAL LOW COMPLEX 20 MIN: CPT

## 2023-06-08 RX ORDER — POLYETHYLENE GLYCOL 3350 17 G/17G
17 POWDER, FOR SOLUTION ORAL ONCE
Status: COMPLETED | OUTPATIENT
Start: 2023-06-08 | End: 2023-06-08

## 2023-06-08 RX ADMIN — IVABRADINE 7.5 MG: 5 TABLET, FILM COATED ORAL at 09:17

## 2023-06-08 RX ADMIN — OXYCODONE HYDROCHLORIDE 10 MG: 10 TABLET ORAL at 20:16

## 2023-06-08 RX ADMIN — POLYETHYLENE GLYCOL 3350 17 G: 17 POWDER, FOR SOLUTION ORAL at 09:17

## 2023-06-08 RX ADMIN — GABAPENTIN 100 MG: 100 CAPSULE ORAL at 09:19

## 2023-06-08 RX ADMIN — DIPHENHYDRAMINE HYDROCHLORIDE 25 MG: 25 CAPSULE ORAL at 11:33

## 2023-06-08 RX ADMIN — IBUPROFEN 800 MG: 800 TABLET, FILM COATED ORAL at 05:15

## 2023-06-08 RX ADMIN — METOPROLOL SUCCINATE 25 MG: 25 TABLET, EXTENDED RELEASE ORAL at 09:19

## 2023-06-08 RX ADMIN — OXYCODONE HYDROCHLORIDE 10 MG: 10 TABLET ORAL at 09:20

## 2023-06-08 RX ADMIN — OXYCODONE HYDROCHLORIDE 10 MG: 10 TABLET ORAL at 12:45

## 2023-06-08 RX ADMIN — PANTOPRAZOLE SODIUM 40 MG: 40 TABLET, DELAYED RELEASE ORAL at 09:19

## 2023-06-08 RX ADMIN — CARIPRAZINE 1.5 MG: 1.5 CAPSULE, GELATIN COATED ORAL at 09:19

## 2023-06-08 RX ADMIN — IBUPROFEN 800 MG: 800 TABLET, FILM COATED ORAL at 21:08

## 2023-06-08 RX ADMIN — POLYETHYLENE GLYCOL 3350 17 G: 17 POWDER, FOR SOLUTION ORAL at 18:07

## 2023-06-08 RX ADMIN — IVABRADINE 7.5 MG: 5 TABLET, FILM COATED ORAL at 17:40

## 2023-06-08 RX ADMIN — TOPIRAMATE 25 MG: 25 CAPSULE, COATED PELLETS ORAL at 09:18

## 2023-06-08 RX ADMIN — DULOXETINE 60 MG: 60 CAPSULE, DELAYED RELEASE ORAL at 09:19

## 2023-06-08 RX ADMIN — DULOXETINE 60 MG: 60 CAPSULE, DELAYED RELEASE ORAL at 20:09

## 2023-06-08 RX ADMIN — HEPARIN SODIUM 5000 UNITS: 5000 INJECTION INTRAVENOUS; SUBCUTANEOUS at 20:09

## 2023-06-08 RX ADMIN — TEMAZEPAM 15 MG: 15 CAPSULE ORAL at 20:16

## 2023-06-08 RX ADMIN — PANTOPRAZOLE SODIUM 40 MG: 40 TABLET, DELAYED RELEASE ORAL at 17:40

## 2023-06-08 RX ADMIN — IBUPROFEN 800 MG: 800 TABLET, FILM COATED ORAL at 14:51

## 2023-06-08 RX ADMIN — HEPARIN SODIUM 5000 UNITS: 5000 INJECTION INTRAVENOUS; SUBCUTANEOUS at 09:18

## 2023-06-08 NOTE — PLAN OF CARE
Goal Outcome Evaluation:  Plan of Care Reviewed With: patient        Progress: no change  Outcome Evaluation: Patient presents with decreased activity tolerance, weakness, and balance impairments affecting functional mobility. She ambulated 20ft, demonstrating unsteadiness and eventually requiring hand held assist for balance. Trialed RW for 2ft but pt unable to ambulate further due to generalized weakness. No reports of light headedness. Skilled IP PT warranted. Recommend IRF at discharge at this time. If pt declines, will need 24/7 supervision, RW, and HHPT.

## 2023-06-08 NOTE — PROGRESS NOTES
"IM progress note      Silvia Tracy  [id number]  1975     LOS: 0 days     Attending: Delmy Kelly,*    Primary Care Provider: Genie You MD      Chief Complaint/Reason for visit:  No chief complaint on file.      Subjective    Doing very well. Good pain control. No f/c/n/vom. Toelrating soft diet. Passed flatus.  No further dizzy spells. Just walked to [facility] and back.    Objective      Visit Vitals  /64 (BP Location: Left arm, Patient Position: Lying)   Pulse 88   Temp 97.7 °F (36.5 °C) (Temporal)   Resp 16   Ht 167.6 cm (66\")   Wt 72.1 kg (159 lb)   SpO2 91%   BMI 25.66 kg/m²     Temp (24hrs), Av.7 °F (36.5 °C), Min:97.1 °F (36.2 °C), Max:98.4 °F (36.9 °C)      Intake/Output:    Intake/Output Summary (Last 24 hours) at 2023 0835  Last data filed at 2023 1802  Gross per 24 hour   Intake 718 ml   Output --   Net 718 ml          Physical Exam:     General Appearance:    Alert, cooperative, in no acute distress   Head:    Normocephalic, without obvious abnormality, atraumatic    Lungs:     Normal effort, symmetric chest rise,  clear to  auscultation bilaterally                 Heart:    Regular rhythm and normal rate, normal S1 and S2   Abdomen:     Soft, benign, clean incisions.    Extremities:   No clubbing, cyanosis or edema.  No deformities.    Pulses:   Pulses palpable and equal bilaterally   Skin:   No bleeding, bruising or rash          Results Review:     I reviewed the patient's new clinical results.   Results from last 7 days   Lab Units 23  0611 23  1229   WBC 10*3/mm3 9.12 6.71   HEMOGLOBIN g/dL 10.5* 13.1   HEMATOCRIT % 32.6* 41.0   PLATELETS 10*3/mm3 205 260     Results from last 7 days   Lab Units 23  0611 23  1229   SODIUM mmol/L 141 139   POTASSIUM mmol/L 4.1 4.4   CHLORIDE mmol/L 110* 110*   CO2 mmol/L 24.0 19.0*   BUN mg/dL 8 11   CREATININE mg/dL 0.81 0.84   CALCIUM mg/dL 8.4* 9.1   GLUCOSE mg/dL 88 127*     I reviewed the " patient's new imaging including images and reports.    All medications reviewed.   Cariprazine HCl, 1.5 mg, Oral, Daily  DULoxetine, 60 mg, Oral, Q12H  estradiol, 1 patch, Transdermal, Weekly  gabapentin, 100 mg, Oral, TID  heparin (porcine), 5,000 Units, Subcutaneous, Q12H  ibuprofen, 800 mg, Oral, Q8H  ivabradine HCl, 7.5 mg, Oral, BID With Meals  metoprolol succinate XL, 25 mg, Oral, Daily  pantoprazole, 40 mg, Oral, BID AC  topiramate, 25 mg, Oral, Daily          Assessment & Plan       S/P hysterectomy    POTS (postural orthostatic tachycardia syndrome)    Fibromyalgia     Plan  Doing very well.  Instructed on IS use, she will take home with her.  Miralax dose today. She has at home. Resuming Linzess as well.    Ready for dc from my standpoint.    Dragon disclaimer:  Part of this encounter note is an electronic transcription/translation of spoken language to printed text. The electronic translation of spoken language may permit erroneous, or at times, nonsensical words or phrases to be inadvertently transcribed; Although I have reviewed the note for such errors, some may still exist.    Destiny Krueger MD  06/08/23  08:35 EDT    Addendum:  Patient was later seen by physical therapy, was noted to have unsteadiness with ambulation and balance impairment.  Inpatient rehab was recommended, patient prefers to go home, home health PT with 24/7 supervision and a rolling walker have been recommended.   is following to ensure all home needs are met prior to discharge.  Discharge has been postponed at least through tomorrow.  Discussed with Dr. Kelly

## 2023-06-08 NOTE — PROGRESS NOTES
Surgery Post-op Note  [place]    * No Diagnosis Codes entered *    * No Diagnosis Codes entered *    Procedure(s):  TOTAL LAPAROSCOPIC HYSTERECTOMY BILATERAL SALPINGOOPHORECTOMY WITH DAVINCI ROBOT, CYSTOSCOPY, TENSION FREE VAGINAL TAPING SUBURETHRAL SLING    Subjective     Pt has been having her fibromyalgia pain in her legs. Had some physical therapy and this took out what little energy she had.  She has no further energy to go home.  Her legs hurt . She felt like her POTS was going to act up so she took some benedryl.      As far as surgery.  No bleeding, surgical pain controlled.  Eating some just feels puny from  all her medical problems.     Objective   Physical Exam  Vitals:    06/08/23 1100   BP: (!) 87/54   Pulse: 85   Resp: 16   Temp: 98.4 °F (36.9 °C)   SpO2: 90%     General: appears puny , but not in distress  ambulating with assistance from her .   Pulm: CTAB\    CVS: no M/R/G, reg rate    Abd: soft, NT, ND, NABS      Ext: warm, well perfused      Labs:  Lab Results (last 24 hours)       Procedure Component Value Units Date/Time    Tissue Pathology Exam [[id number]] Collected: 06/06/23 0841    Specimen: Tissue from Uterus with Cervix, Bilateral Tubes and Ovaries Updated: 06/07/23 1500     Case Report --     Surgical Pathology Report                         Case: [id number]                                  Authorizing Provider:  Delmy Kelly,   Collected:           06/06/2023 08:41 AM                                 MD                                                                           Ordering Location:     [facility]   Received:            06/06/2023 10:32 AM                                 OR                                                                           Pathologist:           Bobby Saldana MD                                                        Specimen:    Uterus with Cervix, Bilateral Tubes and Ovaries                                              "Clinical Information --     Abnormal bleeding time         Final Diagnosis --     UTERUS, CERVIX, BILATERAL OVARIES AND FALLOPIAN TUBES, HYSTERECTOMY AND BILATERAL SALPINGO-OOPHORECTOMY:  Cervix: Mild chronic cervicitis.  Endometrium: Changes suggestive of prior ablation with limited residual viable endometrium present with no hyperplasia or atypia identified.  Myometrium: No significant histopathologic change.  Right ovary: Benign follicular cysts.  Left ovary: Benign physiologic ovary.  Bilateral fallopian tubes: No significant histopathologic change.       Gross Description --     1. Uterus with Cervix, Bilateral Tubes and Ovaries.  Received in formalin labeled \"uterus with cervix, bilateral tubes and ovaries\" is an intact simple hysterectomy specimen with attached cervix and bilateral fallopian tubes and ovaries.  The uterine body is surfaced by tan smooth serosa and a 6.7 x 6.5 x 4.7 cm.  The smooth ectocervix is 3.5 x 3.5 cm and has a patent os measuring 1 cm in diameter.  The uterine body and cervix is 128.9 g.  The tan, corrugated endocervical canal is 3.5 cm long and contains a moderate amount of blood-tinged mucus.  No polyps or invasive lesions are present.  The 5.5 x 3.5 cm triangular endometrial cavity is surfaced by friable, necrotic tissue.  No normal endometrium is identified.  The myometrium is tan and trabecular and averages 2.5 cm thick.  No intramural, subserosal or submucosal nodules are present.  The right, cystic ovary is 18.2 g and 5.2 x 3.5 x 2.4 cm sectioning reveals tan stroma and a 3.5 cm in greatest dimension unilocular, smooth-walled cyst.  The left, cerebriform ovary is 1.9 g and 3.5 x 1.5 x 1.2 cm and sectioning reveals unremarkable tan-pink stroma with 2 serous cyst measuring less than 1 cm in greatest dimension.  The bilateral fimbriated fallopian tubes are mildly tortuous and average 5 cm long by 0.7 cm in diameter.  Sectioning reveals an unremarkable lumen.  Representative " sections are submitted as follows:    1A: Anterior and posterior cervix  1D-1C: Anterior endomyometrium (full-thickness in 1B)  1D-1E: Posterior endomyometrium (full-thickness in 1D)  1F: Right ovary  1G: Right fallopian tube with fimbria  1H: Left ovary  1I: Left fallopian tube with fimbria   HDM         Microscopic Description --     The slides are reviewed and demonstrate histopathologic features supporting the above rendered diagnosis.                    Intake and Output:    Intake/Output Summary (Last 24 hours) at 6/8/2023 1142  Last data filed at 6/7/2023 1802  Gross per 24 hour   Intake 600 ml   Output --   Net 600 ml       Assessment     The patient is a 48 y.o. female 2 Days Post-Op after hysterectomy.  Pt was planned to d/c but now her fibromyalgia and POTS flares have her not ready to go.  Will set up and possibly tomorrow can go to [facility] or at least [facility] .      Plan     D/c discharge today.  Comfort treaments for her pots and her fibromyalgia.       Delmy Kelly MD  06/08/23  11:42 EDT

## 2023-06-08 NOTE — PLAN OF CARE
Problem: Adult Inpatient Plan of Care  Goal: Plan of Care Review  6/7/2023 2248 by Enma Carreon RN  Outcome: Ongoing, Progressing  6/7/2023 2246 by Enma Carreon RN  Outcome: Ongoing, Progressing  Goal: Patient-Specific Goal (Individualized)  6/7/2023 2248 by Enma Carreon RN  Outcome: Ongoing, Progressing  6/7/2023 2246 by Enma Carreon RN  Outcome: Ongoing, Progressing  Goal: Absence of Hospital-Acquired Illness or Injury  6/7/2023 2248 by Enma Carreon RN  Outcome: Ongoing, Progressing  6/7/2023 2246 by Enma Carreon RN  Outcome: Ongoing, Progressing  Intervention: Identify and Manage Fall Risk  Recent Flowsheet Documentation  Taken 6/7/2023 2200 by Enma Carreon RN  Safety Promotion/Fall Prevention:   assistive device/personal items within reach   clutter free environment maintained   room organization consistent   safety round/check completed  Taken 6/7/2023 2000 by Enma Carreon RN  Safety Promotion/Fall Prevention:   assistive device/personal items within reach   clutter free environment maintained   nonskid shoes/slippers when out of bed   room organization consistent   safety round/check completed  Intervention: Prevent Skin Injury  Recent Flowsheet Documentation  Taken 6/7/2023 2200 by Enma Carreon RN  Body Position: position changed independently  Skin Protection: adhesive use limited  Taken 6/7/2023 2000 by Enma Carreon RN  Body Position: position changed independently  Skin Protection: adhesive use limited  Intervention: Prevent and Manage VTE (Venous Thromboembolism) Risk  Recent Flowsheet Documentation  Taken 6/7/2023 2200 by Enma Carreon RN  Activity Management: bedrest  Taken 6/7/2023 2000 by Enma Carreon RN  Activity Management: bedrest  Goal: Optimal Comfort and Wellbeing  6/7/2023 2248 by Enma Carreon RN  Outcome: Ongoing, Progressing  6/7/2023 2246 by Enma Carreon RN  Outcome: Ongoing, Progressing  Intervention: Provide Person-Centered Care  Recent Flowsheet Documentation  Taken  6/7/2023 2000 by Enma Carreon RN  Trust Relationship/Rapport:   care explained   choices provided  Goal: Readiness for Transition of Care  6/7/2023 2248 by Enma Carreon RN  Outcome: Ongoing, Progressing  6/7/2023 2246 by Enma Carreon RN  Outcome: Ongoing, Progressing     Problem: Bleeding (Hysterectomy)  Goal: Absence of Bleeding  Outcome: Ongoing, Progressing     Problem: Bowel Motility Impaired (Hysterectomy)  Goal: Effective Bowel Elimination  Outcome: Ongoing, Progressing     Problem: Fluid and Electrolyte Imbalance (Hysterectomy)  Goal: Fluid and Electrolyte Balance  Outcome: Ongoing, Progressing     Problem: Infection (Hysterectomy)  Goal: Absence of Infection Signs and Symptoms  Outcome: Ongoing, Progressing     Problem: Ongoing Anesthesia Effects (Hysterectomy)  Goal: Anesthesia/Sedation Recovery  Outcome: Ongoing, Progressing     Problem: Pain (Hysterectomy)  Goal: Acceptable Pain Control  Outcome: Ongoing, Progressing     Problem: Postoperative Nausea and Vomiting (Hysterectomy)  Goal: Nausea and Vomiting Relief  Outcome: Ongoing, Progressing     Problem: Postoperative Urinary Retention (Hysterectomy)  Goal: Effective Urinary Elimination  Outcome: Ongoing, Progressing     Problem: Respiratory Compromise (Hysterectomy)  Goal: Effective Oxygenation and Ventilation  Outcome: Ongoing, Progressing   Goal Outcome Evaluation:

## 2023-06-08 NOTE — DISCHARGE SUMMARY
[facility] GYN Discharge Note    Date of Discharge:  6/8/2023    Discharge Diagnosis:   Adenomyosis of the uterus(N80.03)    Problem List:    S/P hysterectomy    POTS (postural orthostatic tachycardia syndrome)    Fibromyalgia      Presenting Problem/History of Present Illness  Adenomyosis of the uterus [N80.03]    Desired definitive surgical management    Hospital Course  Patient is a 48 y.o. female presented with adenomyosis.  Has had a couple of episodes of tachycardia related to her POTS but has recovered well.  Has been seen by hospital medicine as well.  Her pain is now under control, she is tolerating regular diet, voiding well, ambulating.      Procedures Performed  Procedure(s):  TOTAL LAPAROSCOPIC HYSTERECTOMY BILATERAL SALPINGOOPHORECTOMY WITH DAVINCI ROBOT, CYSTOSCOPY, TENSION FREE VAGINAL TAPING SUBURETHRAL SLING       Consults:   Consults       Date and Time Order Name Status Description    6/7/2023  8:15 AM Inpatient Hospitalist Consult Completed             Pertinent Test Results:  Pathology benign    Condition on Discharge:  Good    Vital Signs  Temp:  [97.1 °F (36.2 °C)-98.4 °F (36.9 °C)] 97.7 °F (36.5 °C)  Heart Rate:  [76-88] 88  Resp:  [14-18] 16  BP: ()/(43-64) 102/64    Discharge Disposition      Discharge Medications     Discharge Medications        ASK your doctor about these medications        Instructions Start Date   acetaminophen 500 MG tablet  Commonly known as: TYLENOL   1,000 mg, Oral, Every 6 Hours PRN      atomoxetine 80 MG capsule  Commonly known as: STRATTERA   80 mg, Oral, Daily      dexlansoprazole 60 MG capsule  Commonly known as: DEXILANT   60 mg, Oral, Daily      DULoxetine 60 MG capsule  Commonly known as: CYMBALTA   1 capsule, Oral, Every 12 Hours Scheduled      hydroxychloroquine 200 MG tablet  Commonly known as: PLAQUENIL   200 mg, Oral, Daily      ibuprofen 600 MG tablet  Commonly known as: ADVIL,MOTRIN   600 mg, Oral, Every 6 Hours PRN      ivabradine HCl  7.5 MG tablet tablet  Commonly known as: CORLANOR   7.5 mg, Oral, 2 Times Daily With Meals      Lifitegrast 5 % ophthalmic solution  Commonly known as: XIIDRA   1 drop, Both Eyes, 2 Times Daily      linaclotide 145 MCG capsule capsule  Commonly known as: LINZESS   145 mcg, Oral, Every Morning Before Breakfast      metoprolol succinate XL 25 MG 24 hr tablet  Commonly known as: TOPROL-XL   1 tablet, Oral, Daily      Northera 100 MG capsule  Generic drug: Droxidopa   3 Times Daily, 18 pills a day       promethazine 25 MG tablet  Commonly known as: PHENERGAN   25 mg, Oral, Every 6 Hours PRN      temazepam 7.5 MG capsule  Commonly known as: RESTORIL   7.5 mg, Oral, Nightly PRN      Topiramate  MG capsule sustained-release 24 hr   1 capsule, Oral, Daily      traMADol  MG 24 hr tablet  Commonly known as: ULTRAM-ER   200 mg, Oral, Daily PRN      vitamin B-12 500 MCG tablet  Commonly known as: CYANOCOBALAMIN   500 mcg, Oral, Daily      Vitamin D3 1.25 MG (38950 UT) capsule   Vitamin D3      VRAYLAR PO   1 tablet, Oral, Daily, 1.5 mg                Discharge Diet: regular      Activity at Discharge: pelvic rest, no lifting >10lbs, shower only, No exercise beyond walking      Follow-up Appointments: June 26, 2023 at 1:00pm virtual visit with Stephanie Kinsey PA-C    Additional Instructions for the Follow-ups that You Need to Schedule       Chlorhexidine Skin Prep   May 23, 2023      Chlorhexidine Skin Prep and Instructions For All Patients Having A Procedure Requiring an Outward Incision if Not Allergic. If Allergic, Give Antibacterial Skin Wipes and Instructions. Do Not Use For Facial Cases or on Any Mucus Membranes.    Order Comments: Chlorhexidine Skin Prep and Instructions For All Patients Having A Procedure Requiring an Outward Incision if Not Allergic. If Allergic, Give Antibacterial Skin Wipes and Instructions. Do Not Use For Facial Cases or on Any Mucus Membranes.                  Test Results Pending at  Discharge      Time: Discharge 30 min

## 2023-06-08 NOTE — PROGRESS NOTES
Met with patient to discuss home care and she is agreeable to [facility]. PCP is Dr Genie You and message left regarding following for home care. Hernán ROBBINS([facility])Hospital Liaison-return p/c received from Deuce at Dr You's office and Dr You will follow for home care. Hernán ROBBINS([facility] Liaison

## 2023-06-08 NOTE — PROGRESS NOTES
[facility] GYN Post-Op Note    Subjective     Subjective:  Patient did well overnight.  Her pain is well controlled with narcotic analgesics including oxycodone (Oxycontin, Oxyir).  She is not having nausea.  She is not having emesis.  She is tolerating po -- normal.  Intake -- c/o of tolerating po solids.   flatus.  She is voiding -- without difficulty.  She is ambulating.   She is using her incentive spirometer.      Objective     Physical Exam    Vital signs current     Intake/Output last 24 hours:    Intake/Output Summary (Last 24 hours) at 6/8/2023 0804  Last data filed at 6/7/2023 1802  Gross per 24 hour   Intake 718 ml   Output --   Net 718 ml     Intake/Output this shift:  No intake/output data recorded.    Labs:   Lab Results (most recent)       Procedure Component Value Units Date/Time    Tissue Pathology Exam [[id number]] Collected: 06/06/23 0841    Specimen: Tissue from Uterus with Cervix, Bilateral Tubes and Ovaries Updated: 06/07/23 1500     Case Report --     Surgical Pathology Report                         Case: [id number]                                  Authorizing Provider:  Delmy Kelly,   Collected:           06/06/2023 08:41 AM                                 MD                                                                           Ordering Location:     [facility]   Received:            06/06/2023 10:32 AM                                 OR                                                                           Pathologist:           Bobby Saldana MD                                                        Specimen:    Uterus with Cervix, Bilateral Tubes and Ovaries                                             Clinical Information --     Abnormal bleeding time         Final Diagnosis --     UTERUS, CERVIX, BILATERAL OVARIES AND FALLOPIAN TUBES, HYSTERECTOMY AND BILATERAL SALPINGO-OOPHORECTOMY:  Cervix: Mild chronic cervicitis.  Endometrium: Changes  "suggestive of prior ablation with limited residual viable endometrium present with no hyperplasia or atypia identified.  Myometrium: No significant histopathologic change.  Right ovary: Benign follicular cysts.  Left ovary: Benign physiologic ovary.  Bilateral fallopian tubes: No significant histopathologic change.       Gross Description --     1. Uterus with Cervix, Bilateral Tubes and Ovaries.  Received in formalin labeled \"uterus with cervix, bilateral tubes and ovaries\" is an intact simple hysterectomy specimen with attached cervix and bilateral fallopian tubes and ovaries.  The uterine body is surfaced by tan smooth serosa and a 6.7 x 6.5 x 4.7 cm.  The smooth ectocervix is 3.5 x 3.5 cm and has a patent os measuring 1 cm in diameter.  The uterine body and cervix is 128.9 g.  The tan, corrugated endocervical canal is 3.5 cm long and contains a moderate amount of blood-tinged mucus.  No polyps or invasive lesions are present.  The 5.5 x 3.5 cm triangular endometrial cavity is surfaced by friable, necrotic tissue.  No normal endometrium is identified.  The myometrium is tan and trabecular and averages 2.5 cm thick.  No intramural, subserosal or submucosal nodules are present.  The right, cystic ovary is 18.2 g and 5.2 x 3.5 x 2.4 cm sectioning reveals tan stroma and a 3.5 cm in greatest dimension unilocular, smooth-walled cyst.  The left, cerebriform ovary is 1.9 g and 3.5 x 1.5 x 1.2 cm and sectioning reveals unremarkable tan-pink stroma with 2 serous cyst measuring less than 1 cm in greatest dimension.  The bilateral fimbriated fallopian tubes are mildly tortuous and average 5 cm long by 0.7 cm in diameter.  Sectioning reveals an unremarkable lumen.  Representative sections are submitted as follows:    1A: Anterior and posterior cervix  1D-1C: Anterior endomyometrium (full-thickness in 1B)  1D-1E: Posterior endomyometrium (full-thickness in 1D)  1F: Right ovary  1G: Right fallopian tube with fimbria  1H: Left " ovary  1I: Left fallopian tube with fimbria   HDM         Microscopic Description --     The slides are reviewed and demonstrate histopathologic features supporting the above rendered diagnosis.        Basic Metabolic Panel [[id number]]  (Abnormal) Collected: 06/07/23 0611    Specimen: Blood Updated: 06/07/23 0654     Glucose 88 mg/dL      BUN 8 mg/dL      Creatinine 0.81 mg/dL      Sodium 141 mmol/L      Potassium 4.1 mmol/L      Chloride 110 mmol/L      CO2 24.0 mmol/L      Calcium 8.4 mg/dL      BUN/Creatinine Ratio 9.9     Anion Gap 7.0 mmol/L      eGFR 88.6 mL/min/1.73     Narrative:      GFR Normal >60  Chronic Kidney Disease <60  Kidney Failure <15      CBC (No Diff) [[id number]]  (Abnormal) Collected: 06/07/23 0611    Specimen: Blood Updated: 06/07/23 0645     WBC 9.12 10*3/mm3      RBC 3.24 10*6/mm3      Hemoglobin 10.5 g/dL      Hematocrit 32.6 %      .6 fL      MCH 32.4 pg      MCHC 32.2 g/dL      RDW 13.1 %      RDW-SD 48.2 fl      MPV 10.4 fL      Platelets 205 10*3/mm3     POC Urine Pregnancy [[id number]] Collected: 06/06/23 0611    Specimen: Urine Updated: 06/06/23 0611     HCG, Urine, QL Negative     Lot Number [id number]     Internal Positive Control Positive     Internal Negative Control Negative     Expiration Date 4/24          Labs/Studies reviewed:  Yes   Radiology:  Imaging Results (Last 24 Hours)       ** No results found for the last 24 hours. **          Radiology studies reviewed:   Yes   Medications:   Current Facility-Administered Medications   Medication Dose Route Frequency Provider Last Rate Last Admin    acetaminophen (TYLENOL) tablet 650 mg  650 mg Oral Q4H PRN Delmy Kelly MD        Or    acetaminophen (TYLENOL) 160 MG/5ML solution 650 mg  650 mg Oral Q4H PRN Delmy Kelly MD        Or    acetaminophen (TYLENOL) suppository 650 mg  650 mg Rectal Q4H PRN Delmy Kelly MD        aluminum-magnesium hydroxide-simethicone (MAALOX MAX) 400-400-40  MG/5ML suspension 15 mL  15 mL Oral Q6H PRN Delmy Kelly MD        Cariprazine HCl (VRAYLAR) capsule capsule 1.5 mg  1.5 mg Oral Daily Delmy Kelly MD   1.5 mg at 06/07/23 0901    diphenhydrAMINE (BENADRYL) capsule 25 mg  25 mg Oral Q6H PRN Delmy Kelly MD   25 mg at 06/07/23 2011    DULoxetine (CYMBALTA) DR capsule 60 mg  60 mg Oral Q12H Delmy Kelly MD   60 mg at 06/07/23 2011    estradiol (CLIMARA) 0.1 MG/24HR patch 1 patch  1 patch Transdermal Weekly Delmy Kelly MD   1 patch at 06/07/23 0900    gabapentin (NEURONTIN) capsule 100 mg  100 mg Oral TID Delmy Kelly MD   100 mg at 06/07/23 2012    heparin (porcine) 5000 UNIT/ML injection 5,000 Units  5,000 Units Subcutaneous Q12H Delmy Kelly MD   5,000 Units at 06/07/23 2011    ibuprofen (ADVIL,MOTRIN) tablet 800 mg  800 mg Oral Q8H Delmy Kelly MD   800 mg at 06/08/23 0515    ivabradine HCl (CORLANOR) tablet 7.5 mg  7.5 mg Oral BID With Meals Delmy Kelly MD   7.5 mg at 06/07/23 1714    lactated ringers infusion  9 mL/hr Intravenous Continuous Delym Kelly MD 0 mL/hr at 06/06/23 0902 Restarted at 06/06/23 0917    lactated ringers infusion  100 mL/hr Intravenous Continuous Delmy Kelly MD   Stopped at 06/07/23 0902    metoprolol succinate XL (TOPROL-XL) 24 hr tablet 25 mg  25 mg Oral Daily Delmy Kelly MD        morphine injection 1 mg  1 mg Intravenous Q4H PRN Delmy Kelly MD   1 mg at 06/07/23 0700    And    naloxone (NARCAN) injection 0.4 mg  0.4 mg Intravenous Q5 Min PRN Delmy Kelly MD        ondansetron (ZOFRAN) tablet 4 mg  4 mg Oral Q6H PRN Delmy Kelly MD        Or    ondansetron (ZOFRAN) injection 4 mg  4 mg Intravenous Q6H PRN Delmy Kelly MD        oxyCODONE (ROXICODONE) immediate release tablet 10 mg  10 mg Oral Q4H PRN Delmy Kelly MD   10 mg at  06/07/23 1714    pantoprazole (PROTONIX) EC tablet 40 mg  40 mg Oral BID AC Delmy Kelly MD   40 mg at 06/07/23 1714    polyethylene glycol (MIRALAX) packet 17 g  17 g Oral Daily PRN Delmy Kelly MD        simethicone (MYLICON) chewable tablet 80 mg  80 mg Oral 4x Daily PRN Delmy Kelly MD        temazepam (RESTORIL) capsule 15 mg  15 mg Oral Nightly PRN Delmy Kelly MD   15 mg at 06/07/23 2017    topiramate (TOPAMAX) capsule 25 mg  25 mg Oral Daily Delmy Kelly MD   25 mg at 06/07/23 0901     Medications reviewed:   Yes     GENERAL: Alert, well-appearing  non  obese female in no apparent distress.    CARDIOVASCULAR: Normal rate, regular rhythm, no murmurs, rubs, or gallops.    RESPIRATORY: Clear to auscultation bilaterally, normal respiratory effort  GASTROINTESTINAL:  Soft, appropriately tender, non-distended, no rebound or guarding.  Positive bowel sounds.  Incisions intact with minimal bruising and no drainage or bleeding  GENITOURINARY: Ibarra previously removed.    SKIN:  Warm, dry, well-perfused.    PSYCHIATRIC: AO x3, with appropriate affect, normal thought processes  EXREMITIES: Symmetric.  No peripheral edema.    Assessment & Plan     Problems Addressed this Visit    None  Visit Diagnoses       Uterine adenomyoma    -  Primary    Relevant Orders    Instruct Patient on Coughing, Deep Breathing and Incentive Spirometry    CBC and Differential (Completed)    Basic Metabolic Panel (Completed)    Preoperative testing        Relevant Orders    CBC and Differential (Completed)    Basic Metabolic Panel (Completed)    Abnormal bleeding time        Relevant Orders    Tissue Pathology Exam (Completed)          Diagnoses         Codes Comments    Uterine adenomyoma    -  Primary ICD-10-CM: D26.9  ICD-9-CM: 219.9     Preoperative testing     ICD-10-CM: Z01.818  ICD-9-CM: V72.84     Abnormal bleeding time     ICD-10-CM: R79.1  ICD-9-CM: 790.92             All  questions answered.    Post op day 2 Procedure(s):  TOTAL LAPAROSCOPIC HYSTERECTOMY BILATERAL SALPINGOOPHORECTOMY WITH DAVINCI ROBOT, CYSTOSCOPY, TENSION FREE VAGINAL TAPING SUBURETHRAL SLING Doing well postoperatively..    Plan:  plan for discharge today   Pathology benign and discussed with pt  Pelvic rest, no lifting over 10 lbs, shower only  Ambulate regularly  Continue incentive spirometer  Post op visit scheduled and pt aware  Has most of home meds - will call in narcotic to her [facility] pharmacy          SUELLEN Brooks  06/08/23  08:04 EDT

## 2023-06-08 NOTE — THERAPY EVALUATION
Patient Name: Silvia Tracy  : 1975    MRN: [id number]                              Today's Date: 2023       Admit Date: 2023    Visit Dx:     ICD-10-CM ICD-9-CM   1. Uterine adenomyoma  D26.9 219.9   2. Preoperative testing  Z01.818 V72.84   3. Abnormal bleeding time  R79.1 790.92     Patient Active Problem List   Diagnosis    Anxiety and depression    History of Clostridium difficile colitis s/p fecal transplant    Impaired functional mobility, balance, gait, and endurance    Chronic fatigue    Rectal pain    Hx of hemorrhoidectomy last week    Adrenal hyperplasia    Borderline abnormal TFTs    S/P hysterectomy    POTS (postural orthostatic tachycardia syndrome)    Fibromyalgia     Past Medical History:   Diagnosis Date    Acute congestive heart failure     DWIGHT positive     Anemia     Anxiety     Chronic headaches     Depression     Dizzy     Elevated cholesterol     Fibromyalgia     Gastritis     GERD (gastroesophageal reflux disease)     h/o    History of Clostridium difficile     stool transplant     History of transfusion     PER PT, RECEIVED SEVERAL UNITS AT [facility], NO REACTION; RECEIVED 2 UNITS AT  HEMATOLOGY [facility], NO REACTION    History of vertebral fracture (2016) 10/27/2016    Hypotension     IBS (irritable bowel syndrome)     Irregular heart rhythm     Migraine without aura and without status migrainosus, not intractable 10/28/2016    Numbness and tingling in both hands     Numbness and tingling of both feet 2018    Numbness and tingling of left leg     POTS (postural orthostatic tachycardia syndrome)     PTSD (post-traumatic stress disorder)     Sjogren's disease     SOBOE (shortness of breath on exertion)     Spinal headache 2019    spinal patch needed    Tachycardia     Tinnitus     Wears glasses      Past Surgical History:   Procedure Laterality Date    BREAST SURGERY Right     bx- marker in place still     SECTION      CHOLECYSTECTOMY N/A  11/06/2020    Procedure: CHOLECYSTECTOMY LAPAROSCOPIC;  Surgeon: Jonah Diop MD;  Location:  [facility];  Service: General;  Laterality: N/A;    COLONOSCOPY      D & C HYSTEROSCOPY ENDOMETRIAL ABLATION N/A 03/01/2023    Procedure: HYSTEROSCOPY WITH NOVASURE ENDOMETRIAL ABLATION;  Surgeon: Delmy Kelly MD;  Location:  [facility];  Service: Gynecology;  Laterality: N/A;    ENDOSCOPY      TEMPOROMANDIBULAR JOINT SURGERY      TOTAL LAPAROSCOPIC HYSTERECTOMY WITH TRANSVAGINAL TAPING N/A 6/6/2023    Procedure: TOTAL LAPAROSCOPIC HYSTERECTOMY BILATERAL SALPINGOOPHORECTOMY WITH DAVINCI ROBOT, CYSTOSCOPY, TENSION FREE VAGINAL TAPING SUBURETHRAL SLING;  Surgeon: Delmy Kelly MD;  Location:  [facility];  Service: Robotics - DaVinci;  Laterality: N/A;    TUBAL ABDOMINAL LIGATION        General Information       Row Name 06/08/23 0833          Physical Therapy Time and Intention    Document Type evaluation  -NS     Mode of Treatment individual therapy;physical therapy  -NS       Row Name 06/08/23 0833          General Information    Patient Profile Reviewed yes  -NS     Prior Level of Function independent:;all household mobility;community mobility;gait;transfer;bed mobility;ADL's;driving;work  Pt works as a teach, 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  -NS     Existing Precautions/Restrictions fall;other (see comments)  abdominal incision; POTS  -NS     Barriers to Rehab medically complex  -NS       Row Name 06/08/23 0833          Living Environment    People in Home spouse;child(ector), dependent  -NS       Row Name 06/08/23 0833          Home Main Entrance    Number of Stairs, Main Entrance two  -NS     Stair Railings, Main Entrance railings on both sides of stairs  -NS       Row Name 06/08/23 0833          Stairs Within Home, Primary    Number of Stairs, Within Home, Primary none  -NS       Row Name 06/08/23 0833          Cognition    Orientation Status (Cognition) oriented x 3  -NS       Row Name 06/08/23 0833           Safety Issues, Functional Mobility    Safety Issues Affecting Function (Mobility) safety precaution awareness;safety precautions follow-through/compliance;sequencing abilities  -NS     Impairments Affecting Function (Mobility) balance;coordination;endurance/activity tolerance;strength;pain  -NS               User Key  (r) = Recorded By, (t) = Taken By, (c) = Cosigned By      Initials Name Provider Type    Judy Bernstein, PT Physical Therapist                   Mobility       Row Name 06/08/23 0833          Bed Mobility    Bed Mobility supine-sit;sit-supine  -NS     Supine-Sit [place] (Bed Mobility) standby assist  -NS     Sit-Supine [place] (Bed Mobility) standby assist  -NS     Assistive Device (Bed Mobility) bed rails;head of bed elevated  -NS     Comment, (Bed Mobility) Pt educated about log rolling for pain management.  -NS       Row Name 06/08/23 0833          Sit-Stand Transfer    Sit-Stand [place] (Transfers) contact guard;verbal cues  -NS     Assistive Device (Sit-Stand Transfers) walker, front-wheeled  -NS       Row Name 06/08/23 0833          Gait/Stairs (Locomotion)    [place] Level (Gait) minimum assist (75% patient effort);contact guard  -NS     Assistive Device (Gait) other (see comments);walker, front-wheeled  L HHA, then RW  -NS     Distance in Feet (Gait) 20+2  -NS     Deviations/Abnormal Patterns (Gait) amauri decreased;gait speed decreased;stride length decreased;base of support, narrow  -NS     Bilateral Gait Deviations forward flexed posture;heel strike decreased  -NS     [place] Level (Stairs) not tested  -NS     Comment, (Gait/Stairs) Patient ambulated at slow pace without AD for 20ft, demonstrating narrow KATHRYN, step-to pattern, and flexed posture. Eventually required L HHA for balance. Distance limited by weakness. Trialed RW for 2ft but pt unable to tolerated further gait due to significant weakness. No reports of dizziness or light headedness.  -NS                User Key  (r) = Recorded By, (t) = Taken By, (c) = Cosigned By      Initials Name Provider Type    Judy Bernstein PT Physical Therapist                   Obj/Interventions       Row Name 06/08/23 0833          Range of Motion Comprehensive    General Range of Motion bilateral lower extremity ROM WFL  -NS       [facility] Name 06/08/23 0833          Strength Comprehensive (MMT)    General Manual Muscle Testing (MMT) Assessment lower extremity strength deficits identified  -NS     Comment, General Manual Muscle Testing (MMT) Assessment BLEs: grossly 4/5  -NS       Row Name 06/08/23 0833          Balance    Balance Assessment sitting static balance;sitting dynamic balance;standing static balance;standing dynamic balance  -NS     Static Sitting Balance standby assist  -NS     Dynamic Sitting Balance standby assist  -NS     Position, Sitting Balance unsupported;sitting edge of bed  -NS     Static Standing Balance contact guard  -NS     Dynamic Standing Balance minimal assist  -NS     Position/Device Used, Standing Balance supported  -NS       Row Name 06/08/23 08          Sensory Assessment (Somatosensory)    Sensory Assessment (Somatosensory) LE sensation intact  -NS               User Key  (r) = Recorded By, (t) = Taken By, (c) = Cosigned By      Initials Name Provider Type    Judy Bernstein PT Physical Therapist                   Goals/Plan       Row Name 06/08/23 0833          Bed Mobility Goal 1 (PT)    Activity/Assistive Device (Bed Mobility Goal 1, PT) sit to supine/supine to sit  -NS     [place] Level/Cues Needed (Bed Mobility Goal 1, PT) independent  -NS     Time Frame (Bed Mobility Goal 1, PT) long term goal (LTG);2 weeks  -NS       Row Name 06/08/23 0833          Transfer Goal 1 (PT)    Activity/Assistive Device (Transfer Goal 1, PT) sit-to-stand/stand-to-sit;bed-to-chair/chair-to-bed  -NS     [place] Level/Cues Needed (Transfer Goal 1, PT) independent  -NS     Time Frame (Transfer Goal 1, PT)  long term goal (LTG);2 weeks  -NS       Row Name 06/08/23 0833          Gait Training Goal 1 (PT)    Activity/Assistive Device (Gait Training Goal 1, PT) gait (walking locomotion)  -NS     [place] Level (Gait Training Goal 1, PT) standby assist  -NS     Distance (Gait Training Goal 1, PT) 150  -NS     Time Frame (Gait Training Goal 1, PT) long term goal (LTG);2 weeks  -NS       Row Name 06/08/23 0833          Stairs Goal 1 (PT)    Activity/Assistive Device (Stairs Goal 1, PT) ascending stairs;descending stairs  -NS     [place] Level/Cues Needed (Stairs Goal 1, PT) standby assist  -NS     Number of Stairs (Stairs Goal 1, PT) 2  -NS     Time Frame (Stairs Goal 1, PT) long term goal (LTG);2 weeks  -NS       Row Name 06/08/23 0833          Therapy Assessment/Plan (PT)    Planned Therapy Interventions (PT) balance training;bed mobility training;home exercise program;gait training;neuromuscular re-education;patient/family education;strengthening;stair training;transfer training  -NS               User Key  (r) = Recorded By, (t) = Taken By, (c) = Cosigned By      Initials Name Provider Type    Judy Bernstein, PT Physical Therapist                   Clinical Impression       Row Name 06/08/23 0833          Pain    Pretreatment Pain Rating 3/10  -NS     Posttreatment Pain Rating 7/10  -NS     Pain Location - Side/Orientation Bilateral  -NS     Pain Location anterior  -NS     Pain Location - abdomen  -NS     Pre/Posttreatment Pain Comment RN notified  -NS     Pain Intervention(s) Repositioned;Ambulation/increased activity;Nursing Notified  -NS       Row Name 06/08/23 0833          Plan of Care Review    Plan of Care Reviewed With patient  -NS     Progress no change  -NS     Outcome Evaluation Patient presents with decreased activity tolerance, weakness, and balance impairments affecting functional mobility. She ambulated 20ft, demonstrating unsteadiness and eventually requiring hand held assist for balance.  Trialed RW for 2ft but pt unable to ambulate further due to generalized weakness. No reports of light headedness. Skilled IP PT warranted. Recommend IRF at discharge at this time. If pt declines, will need 24/7 supervision, RW, and HHPT.  -NS       Row Name 06/08/23 0833          Therapy Assessment/Plan (PT)    Patient/Family Therapy Goals Statement (PT) to get stronger  -NS     Rehab Potential (PT) good, to achieve stated therapy goals  -NS     Criteria for Skilled Interventions Met (PT) yes;meets criteria;skilled treatment is necessary  -NS     Therapy Frequency (PT) daily  -NS     Predicted Duration of Therapy Intervention (PT) 2 weeks  -NS       Row Name 06/08/23 0833          Vital Signs    Pre Systolic BP Rehab --  no tele- RN cleared for PT  -NS     Pre Patient Position Sitting  -NS     Intra Patient Position Standing  -NS     Post Patient Position Sitting  -NS       Row Name 06/08/23 0833          Positioning and Restraints    Pre-Treatment Position in bed  -NS     Post Treatment Position bed  -NS     In Bed notified nsg;fowlers;call light within reach;encouraged to call for assist;exit alarm on;side rails up x3  -NS               User Key  (r) = Recorded By, (t) = Taken By, (c) = Cosigned By      Initials Name Provider Type    Judy Bernstein, PT Physical Therapist                   Outcome Measures       Row Name 06/08/23 0833          How much help from another person do you currently need...    Turning from your back to your side while in flat bed without using bedrails? 4  -NS     Moving from lying on back to sitting on the side of a flat bed without bedrails? 2  -NS     Moving to and from a bed to a chair (including a wheelchair)? 3  -NS     Standing up from a chair using your arms (e.g., wheelchair, bedside chair)? 3  -NS     Climbing 3-5 steps with a railing? 2  -NS     To walk in hospital room? 3  -NS     AM-PAC 6 Clicks Score (PT) 17  -NS     Highest level of mobility 5 --> Static standing  -NS        Row Name 06/08/23 0833          Functional Assessment    Outcome Measure Options AM-PAC 6 Clicks Basic Mobility (PT)  -NS               User Key  (r) = Recorded By, (t) = Taken By, (c) = Cosigned By      Initials Name Provider Type    Judy Bernstein PT Physical Therapist                                 Physical Therapy Education       Title: PT OT SLP Therapies (In Progress)       Topic: Physical Therapy (In Progress)       Point: Mobility training (Done)       Learning Progress Summary             Patient Acceptance, E, VU by NS at 6/8/2023 0917    Comment: log rolling, benefits of OOB activity, safety with ambulation, benefits of AD for gait                         Point: Home exercise program (Not Started)       Learner Progress:  Not documented in this visit.              Point: Body mechanics (Done)       Learning Progress Summary             Patient Acceptance, E, VU by NS at 6/8/2023 0917    Comment: log rolling, benefits of OOB activity, safety with ambulation, benefits of AD for gait                         Point: Precautions (Done)       Learning Progress Summary             Patient Acceptance, E, VU by NS at 6/8/2023 0917    Comment: log rolling, benefits of OOB activity, safety with ambulation, benefits of AD for gait                                         User Key       Initials Effective Dates Name Provider Type Suresh BARTLETT 06/16/21 -  Judy Wheatley PT Physical Therapist PT                  PT Recommendation and Plan  Planned Therapy Interventions (PT): balance training, bed mobility training, home exercise program, gait training, neuromuscular re-education, patient/family education, strengthening, stair training, transfer training  Plan of Care Reviewed With: patient  Progress: no change  Outcome Evaluation: Patient presents with decreased activity tolerance, weakness, and balance impairments affecting functional mobility. She ambulated 20ft, demonstrating unsteadiness and eventually  requiring hand held assist for balance. Trialed RW for 2ft but pt unable to ambulate further due to generalized weakness. No reports of light headedness. Skilled IP PT warranted. Recommend IRF at discharge at this time. If pt declines, will need 24/7 supervision, RW, and HHPT.     Time Calculation:    PT Charges       Row Name 06/08/23 0833             Time Calculation    Start Time 0833  -NS      PT Received On 06/08/23  -NS      PT Goal Re-Cert Due Date 06/18/23  -NS         Untimed Charges    PT Eval/Re-eval Minutes 49  -NS         Total Minutes    Untimed Charges Total Minutes 49  -NS       Total Minutes 49  -NS                User Key  (r) = Recorded By, (t) = Taken By, (c) = Cosigned By      Initials Name Provider Type    Judy Bernstein PT Physical Therapist                  Therapy Charges for Today       Code Description Service Date Service Provider Modifiers Qty    [id number] HC PT EVAL LOW COMPLEXITY 4 6/8/2023 Judy Wheatley PT GP 1            PT G-Codes  Outcome Measure Options: AM-PAC 6 Clicks Basic Mobility (PT)  AM-PAC 6 Clicks Score (PT): 17  PT Discharge Summary  Anticipated Discharge Disposition (PT): inpatient rehabilitation facility    Judy Wheatley PT  6/8/2023

## 2023-06-08 NOTE — PLAN OF CARE
Problem: Adult Inpatient Plan of Care  Goal: Plan of Care Review  Outcome: Ongoing, Progressing  Goal: Patient-Specific Goal (Individualized)  Outcome: Ongoing, Progressing  Goal: Absence of Hospital-Acquired Illness or Injury  Outcome: Ongoing, Progressing  Intervention: Identify and Manage Fall Risk  Recent Flowsheet Documentation  Taken 6/7/2023 2200 by Emna Carreon RN  Safety Promotion/Fall Prevention:   assistive device/personal items within reach   clutter free environment maintained   room organization consistent   safety round/check completed  Taken 6/7/2023 2000 by Enma Carreon RN  Safety Promotion/Fall Prevention:   assistive device/personal items within reach   clutter free environment maintained   nonskid shoes/slippers when out of bed   room organization consistent   safety round/check completed  Intervention: Prevent Skin Injury  Recent Flowsheet Documentation  Taken 6/7/2023 2200 by Enma Carreon RN  Body Position: position changed independently  Skin Protection: adhesive use limited  Taken 6/7/2023 2000 by Enma Carreon RN  Body Position: position changed independently  Skin Protection: adhesive use limited  Intervention: Prevent and Manage VTE (Venous Thromboembolism) Risk  Recent Flowsheet Documentation  Taken 6/7/2023 2200 by Enma Carreon RN  Activity Management: bedrest  Taken 6/7/2023 2000 by Enma Carreon RN  Activity Management: bedrest  Goal: Optimal Comfort and Wellbeing  Outcome: Ongoing, Progressing  Intervention: Provide Person-Centered Care  Recent Flowsheet Documentation  Taken 6/7/2023 2000 by Enma Carreon RN  Trust Relationship/Rapport:   care explained   choices provided  Goal: Readiness for Transition of Care  Outcome: Ongoing, Progressing   Goal Outcome Evaluation:

## 2023-06-08 NOTE — CASE MANAGEMENT/SOCIAL WORK
Continued Stay Note  [facility]     Patient Name: Silvia Tracy  MRN: [id number]  Today's Date: 6/8/2023    Admit Date: 6/6/2023    Plan: Home   Discharge Plan       Row Name 06/08/23 0906       Plan    Plan Home    Plan Comments Anticipate discharge home today.  Maryam Hallman, Ext. [phone]    Final Discharge Disposition Code 01 - home or self-care                   Discharge Codes    No documentation.                 Expected Discharge Date and Time       Expected Discharge Date Expected Discharge Time    Jun 8, 2023               [employer]

## 2023-06-08 NOTE — CASE MANAGEMENT/SOCIAL WORK
Continued Stay Note  [facility]     Patient Name: Silvia Tracy  MRN: [id number]  Today's Date: 6/8/2023    Admit Date: 6/6/2023    Plan: Home with services   Discharge Plan       Row Name 06/08/23 1603       Plan    Plan Home with services    Patient/Family in Agreement with Plan yes  Patient    Plan Comments Discharge postponed until 6/9/23.  Met with patient at bedside to discuss PT recommendation for inpatient rehab or home health.  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  Referral made to [facility] for PT and nursing, referral was accepted.  Rolling walker ordered through [employer] per PT note, will be delivered to patient room for discharge home tomorrow.  No additional needs at this time, discussed with Dr. Kelly and nursing.  Maryam Hallman, Ext. [phone]    Final Discharge Disposition Code 06 - home with home health care                   Discharge Codes    No documentation.                 Expected Discharge Date and Time       Expected Discharge Date Expected Discharge Time    Jun 9, 2023               [employer]

## 2023-06-09 VITALS
DIASTOLIC BLOOD PRESSURE: 64 MMHG | RESPIRATION RATE: 16 BRPM | SYSTOLIC BLOOD PRESSURE: 100 MMHG | OXYGEN SATURATION: 93 % | TEMPERATURE: 97.6 F | HEART RATE: 74 BPM | BODY MASS INDEX: 25.55 KG/M2 | WEIGHT: 159 LBS | HEIGHT: 66 IN

## 2023-06-09 PROBLEM — Z90.710 S/P HYSTERECTOMY: Status: RESOLVED | Noted: 2023-06-07 | Resolved: 2023-06-09

## 2023-06-09 PROCEDURE — 25010000002 HEPARIN (PORCINE) PER 1000 UNITS: Performed by: OBSTETRICS & GYNECOLOGY

## 2023-06-09 RX ADMIN — POLYETHYLENE GLYCOL 3350 17 G: 17 POWDER, FOR SOLUTION ORAL at 09:50

## 2023-06-09 RX ADMIN — IBUPROFEN 800 MG: 800 TABLET, FILM COATED ORAL at 05:07

## 2023-06-09 RX ADMIN — DULOXETINE 60 MG: 60 CAPSULE, DELAYED RELEASE ORAL at 09:08

## 2023-06-09 RX ADMIN — PANTOPRAZOLE SODIUM 40 MG: 40 TABLET, DELAYED RELEASE ORAL at 09:08

## 2023-06-09 RX ADMIN — HEPARIN SODIUM 5000 UNITS: 5000 INJECTION INTRAVENOUS; SUBCUTANEOUS at 09:08

## 2023-06-09 RX ADMIN — TOPIRAMATE 25 MG: 25 CAPSULE, COATED PELLETS ORAL at 09:08

## 2023-06-09 RX ADMIN — IVABRADINE 7.5 MG: 5 TABLET, FILM COATED ORAL at 09:08

## 2023-06-09 RX ADMIN — CARIPRAZINE 1.5 MG: 1.5 CAPSULE, GELATIN COATED ORAL at 09:08

## 2023-06-09 NOTE — PLAN OF CARE
Goal Outcome Evaluation:  Plan of Care Reviewed With: patient        Progress: improving            Patient up ad eliane to restroom with rolling walker; tolerating activity well. PRN 1x sleep and 1x pain. Adequate UOP. VSS on room air. Will continue to encourage activity as tolerated. Possible D/C today.

## 2023-06-09 NOTE — PROGRESS NOTES
Surgery Post-op Note  [place]    * No Diagnosis Codes entered *    * No Diagnosis Codes entered *    Procedure(s):  TOTAL LAPAROSCOPIC HYSTERECTOMY BILATERAL SALPINGOOPHORECTOMY WITH DAVINCI ROBOT, CYSTOSCOPY, TENSION FREE VAGINAL TAPING SUBURETHRAL SLING    Subjective     Her energy is getting better.  Able to walk better after PT evaluation since her fibromyalgia flare up.  She has slept well. No surgical complaints.  Taking a little less oral pain meds.     Objective   Physical Exam  Vitals:    06/09/23 0700   BP: 100/64   Pulse: 74   Resp: 16   Temp: 97.6 °F (36.4 °C)   SpO2: 93%     General: awake, alert, comfortable    Pulm: CTAB    CVS: no M/R/G, reg rate    Abd: soft, NT, ND, NABS    Ext: warm, well perfused      Labs:  Lab Results (last 24 hours)       ** No results found for the last 24 hours. **                Intake and Output:  No intake or output data in the 24 hours ending 06/09/23 0740    Assessment     The patient is a 48 y.o. female 3 Days Post-Op after robotic hysterectomy BSO TVT    Plan     D/c home  Pt for home health for her fibromyalgia  Pt has her rx at home. Taper off the narotics  HRT given. She has estrgel and progesterone.   Pelvic rest, etc see dc summary .    Delmy Kelly MD  06/09/23  07:40 EDT

## 2023-06-10 ENCOUNTER — HOME CARE VISIT (OUTPATIENT)
Dept: HOME HEALTH SERVICES | Facility: HOME HEALTHCARE | Age: 48
End: 2023-06-10
Payer: COMMERCIAL

## 2023-06-10 VITALS
HEART RATE: 84 BPM | TEMPERATURE: 98.6 F | RESPIRATION RATE: 16 BRPM | DIASTOLIC BLOOD PRESSURE: 62 MMHG | SYSTOLIC BLOOD PRESSURE: 102 MMHG | OXYGEN SATURATION: 100 %

## 2023-06-10 PROCEDURE — G0299 HHS/HOSPICE OF RN EA 15 MIN: HCPCS

## 2023-06-10 NOTE — HOME HEALTH
"SOC Note:    Home Health ordered for: disciplines sn for soc only. PT eval ordered    Reason for Hosp/Primary Dx/Co-morbidities: Patient is a 48yr old  female. Admitted to [facility] for hysterectomy. PMH: POTS, DEPRESSION, ANXIETY, PTSD, H/O CDIFF, H/A, ANEMIA, FIBROMYALGIA, SJOGRENS DISEASE. TACHYCARDIA.    Focus of Care: PT for Therapeutic exercise, Gait Training, Strengthening, Home safety assessment    Patient's goal(s):\"get strength back\"    Current Functional status/mobility/DME: WALKER    HB status/Living Arrangements: LIVES WITH  AND DTR    Skin Integrity/wound status: X5 LAP SITES CDI    Code Status: FULL CODE    Fall Risk/Safety concerns: HIGH RISK    Medication issues/Concerns:NA    Additional Problems/Concerns: NA    SDOH Barriers (i.e. caregiver concerns, social isolation, transportation, food insecurity, environment, income etc.)/Need for MSW: NA"

## 2023-06-13 ENCOUNTER — HOME CARE VISIT (OUTPATIENT)
Dept: HOME HEALTH SERVICES | Facility: HOME HEALTHCARE | Age: 48
End: 2023-06-13
Payer: COMMERCIAL

## 2023-12-05 ENCOUNTER — TELEPHONE (OUTPATIENT)
Dept: GENETICS | Facility: HOSPITAL | Age: 48
End: 2023-12-05
Payer: COMMERCIAL

## 2023-12-05 NOTE — TELEPHONE ENCOUNTER
Contacted the patient regarding the fax in referral from the [facility] for genetic counseling. The patient stated that she had appointment with . Referral will be closed at this time.

## 2024-12-03 ENCOUNTER — HOSPITAL ENCOUNTER (EMERGENCY)
Facility: HOSPITAL | Age: 49
Discharge: HOME | End: 2024-12-03
Attending: EMERGENCY MEDICINE | Admitting: EMERGENCY MEDICINE
Payer: COMMERCIAL

## 2024-12-03 ENCOUNTER — APPOINTMENT (OUTPATIENT)
Dept: MRI IMAGING | Facility: HOSPITAL | Age: 49
End: 2024-12-03
Payer: COMMERCIAL

## 2024-12-03 VITALS
HEART RATE: 80 BPM | RESPIRATION RATE: 16 BRPM | WEIGHT: 135 LBS | SYSTOLIC BLOOD PRESSURE: 117 MMHG | BODY MASS INDEX: 21.69 KG/M2 | TEMPERATURE: 98.1 F | DIASTOLIC BLOOD PRESSURE: 79 MMHG | HEIGHT: 66 IN | OXYGEN SATURATION: 98 %

## 2024-12-03 DIAGNOSIS — G43.709 CHRONIC MIGRAINE WITHOUT AURA WITHOUT STATUS MIGRAINOSUS, NOT INTRACTABLE: Primary | ICD-10-CM

## 2024-12-03 DIAGNOSIS — R82.81 PYURIA: ICD-10-CM

## 2024-12-03 DIAGNOSIS — R74.01 TRANSAMINITIS: ICD-10-CM

## 2024-12-03 DIAGNOSIS — K52.9 COLITIS: ICD-10-CM

## 2024-12-03 LAB
ALBUMIN SERPL-MCNC: 4 G/DL (ref 3.5–5.2)
ALBUMIN/GLOB SERPL: 1.4 G/DL
ALP SERPL-CCNC: 160 U/L (ref 39–117)
ALT SERPL W P-5'-P-CCNC: 772 U/L (ref 1–33)
ANION GAP SERPL CALCULATED.3IONS-SCNC: 12 MMOL/L (ref 5–15)
AST SERPL-CCNC: 537 U/L (ref 1–32)
BACTERIA UR QL AUTO: ABNORMAL /HPF
BASOPHILS # BLD AUTO: 0.02 10*3/MM3 (ref 0–0.2)
BASOPHILS NFR BLD AUTO: 0.5 % (ref 0–1.5)
BILIRUB SERPL-MCNC: 0.4 MG/DL (ref 0–1.2)
BILIRUB UR QL STRIP: ABNORMAL
BUN SERPL-MCNC: 10 MG/DL (ref 6–20)
BUN/CREAT SERPL: 9.8 (ref 7–25)
CALCIUM SPEC-SCNC: 9.2 MG/DL (ref 8.6–10.5)
CHLORIDE SERPL-SCNC: 109 MMOL/L (ref 98–107)
CLARITY UR: CLEAR
CO2 SERPL-SCNC: 21 MMOL/L (ref 22–29)
COD CRY URNS QL: ABNORMAL /HPF
COLOR UR: ABNORMAL
CREAT SERPL-MCNC: 1.02 MG/DL (ref 0.57–1)
D-LACTATE SERPL-SCNC: 1 MMOL/L (ref 0.5–2)
DEPRECATED RDW RBC AUTO: 47.5 FL (ref 37–54)
EGFRCR SERPLBLD CKD-EPI 2021: 66.7 ML/MIN/1.73
EOSINOPHIL # BLD AUTO: 0.24 10*3/MM3 (ref 0–0.4)
EOSINOPHIL NFR BLD AUTO: 5.5 % (ref 0.3–6.2)
ERYTHROCYTE [DISTWIDTH] IN BLOOD BY AUTOMATED COUNT: 12.6 % (ref 12.3–15.4)
GLOBULIN UR ELPH-MCNC: 2.9 GM/DL
GLUCOSE SERPL-MCNC: 81 MG/DL (ref 65–99)
GLUCOSE UR STRIP-MCNC: NEGATIVE MG/DL
HCT VFR BLD AUTO: 42.3 % (ref 34–46.6)
HGB BLD-MCNC: 13.1 G/DL (ref 12–15.9)
HGB UR QL STRIP.AUTO: NEGATIVE
HOLD SPECIMEN: NORMAL
HYALINE CASTS UR QL AUTO: ABNORMAL /LPF
IMM GRANULOCYTES # BLD AUTO: 0.01 10*3/MM3 (ref 0–0.05)
IMM GRANULOCYTES NFR BLD AUTO: 0.2 % (ref 0–0.5)
KETONES UR QL STRIP: ABNORMAL
LEUKOCYTE ESTERASE UR QL STRIP.AUTO: ABNORMAL
LIPASE SERPL-CCNC: 33 U/L (ref 13–60)
LYMPHOCYTES # BLD AUTO: 0.76 10*3/MM3 (ref 0.7–3.1)
LYMPHOCYTES NFR BLD AUTO: 17.6 % (ref 19.6–45.3)
MAGNESIUM SERPL-MCNC: 2.2 MG/DL (ref 1.6–2.6)
MCH RBC QN AUTO: 31.3 PG (ref 26.6–33)
MCHC RBC AUTO-ENTMCNC: 31 G/DL (ref 31.5–35.7)
MCV RBC AUTO: 101.2 FL (ref 79–97)
MONOCYTES # BLD AUTO: 0.37 10*3/MM3 (ref 0.1–0.9)
MONOCYTES NFR BLD AUTO: 8.5 % (ref 5–12)
NEUTROPHILS NFR BLD AUTO: 2.93 10*3/MM3 (ref 1.7–7)
NEUTROPHILS NFR BLD AUTO: 67.7 % (ref 42.7–76)
NITRITE UR QL STRIP: POSITIVE
NRBC BLD AUTO-RTO: 0 /100 WBC (ref 0–0.2)
PH UR STRIP.AUTO: 6 [PH] (ref 5–8)
PHOSPHATE SERPL-MCNC: 3.5 MG/DL (ref 2.5–4.5)
PLATELET # BLD AUTO: 244 10*3/MM3 (ref 140–450)
PMV BLD AUTO: 9.7 FL (ref 6–12)
POTASSIUM SERPL-SCNC: 4.1 MMOL/L (ref 3.5–5.2)
PROT SERPL-MCNC: 6.9 G/DL (ref 6–8.5)
PROT UR QL STRIP: ABNORMAL
RBC # BLD AUTO: 4.18 10*6/MM3 (ref 3.77–5.28)
RBC # UR STRIP: ABNORMAL /HPF
REF LAB TEST METHOD: ABNORMAL
RENAL EPI CELLS #/AREA URNS HPF: ABNORMAL /HPF
SODIUM SERPL-SCNC: 142 MMOL/L (ref 136–145)
SP GR UR STRIP: 1.03 (ref 1–1.03)
SQUAMOUS #/AREA URNS HPF: ABNORMAL /HPF
UROBILINOGEN UR QL STRIP: ABNORMAL
WBC # UR STRIP: ABNORMAL /HPF
WBC NRBC COR # BLD AUTO: 4.33 10*3/MM3 (ref 3.4–10.8)
WHOLE BLOOD HOLD COAG: NORMAL
WHOLE BLOOD HOLD SPECIMEN: NORMAL

## 2024-12-03 PROCEDURE — 96365 THER/PROPH/DIAG IV INF INIT: CPT

## 2024-12-03 PROCEDURE — 84100 ASSAY OF PHOSPHORUS: CPT

## 2024-12-03 PROCEDURE — 96375 TX/PRO/DX INJ NEW DRUG ADDON: CPT

## 2024-12-03 PROCEDURE — 83690 ASSAY OF LIPASE: CPT | Performed by: EMERGENCY MEDICINE

## 2024-12-03 PROCEDURE — A9577 INJ MULTIHANCE: HCPCS | Performed by: EMERGENCY MEDICINE

## 2024-12-03 PROCEDURE — 83605 ASSAY OF LACTIC ACID: CPT | Performed by: EMERGENCY MEDICINE

## 2024-12-03 PROCEDURE — 81001 URINALYSIS AUTO W/SCOPE: CPT | Performed by: EMERGENCY MEDICINE

## 2024-12-03 PROCEDURE — 25010000002 MAGNESIUM SULFATE IN D5W 1G/100ML (PREMIX) 1-5 GM/100ML-% SOLUTION

## 2024-12-03 PROCEDURE — 25810000003 SODIUM CHLORIDE 0.9 % SOLUTION

## 2024-12-03 PROCEDURE — 25010000002 KETOROLAC TROMETHAMINE PER 15 MG

## 2024-12-03 PROCEDURE — 85025 COMPLETE CBC W/AUTO DIFF WBC: CPT | Performed by: EMERGENCY MEDICINE

## 2024-12-03 PROCEDURE — 87086 URINE CULTURE/COLONY COUNT: CPT

## 2024-12-03 PROCEDURE — 83735 ASSAY OF MAGNESIUM: CPT

## 2024-12-03 PROCEDURE — 25010000002 METOCLOPRAMIDE PER 10 MG

## 2024-12-03 PROCEDURE — 70553 MRI BRAIN STEM W/O & W/DYE: CPT

## 2024-12-03 PROCEDURE — 36415 COLL VENOUS BLD VENIPUNCTURE: CPT

## 2024-12-03 PROCEDURE — 99285 EMERGENCY DEPT VISIT HI MDM: CPT

## 2024-12-03 PROCEDURE — 80053 COMPREHEN METABOLIC PANEL: CPT | Performed by: EMERGENCY MEDICINE

## 2024-12-03 PROCEDURE — 25510000002 GADOBENATE DIMEGLUMINE 529 MG/ML SOLUTION: Performed by: EMERGENCY MEDICINE

## 2024-12-03 RX ORDER — METOCLOPRAMIDE HYDROCHLORIDE 5 MG/ML
10 INJECTION INTRAMUSCULAR; INTRAVENOUS ONCE
Status: COMPLETED | OUTPATIENT
Start: 2024-12-03 | End: 2024-12-03

## 2024-12-03 RX ORDER — KETOROLAC TROMETHAMINE 15 MG/ML
15 INJECTION, SOLUTION INTRAMUSCULAR; INTRAVENOUS ONCE
Status: COMPLETED | OUTPATIENT
Start: 2024-12-03 | End: 2024-12-03

## 2024-12-03 RX ORDER — BUTALBITAL, ACETAMINOPHEN AND CAFFEINE 50; 325; 40 MG/1; MG/1; MG/1
1 TABLET ORAL ONCE
Status: COMPLETED | OUTPATIENT
Start: 2024-12-03 | End: 2024-12-03

## 2024-12-03 RX ORDER — MAGNESIUM SULFATE 1 G/100ML
1 INJECTION INTRAVENOUS ONCE
Status: COMPLETED | OUTPATIENT
Start: 2024-12-03 | End: 2024-12-03

## 2024-12-03 RX ORDER — DICYCLOMINE HYDROCHLORIDE 10 MG/1
20 CAPSULE ORAL ONCE
Status: COMPLETED | OUTPATIENT
Start: 2024-12-03 | End: 2024-12-03

## 2024-12-03 RX ORDER — SODIUM CHLORIDE 9 MG/ML
10 INJECTION, SOLUTION INTRAMUSCULAR; INTRAVENOUS; SUBCUTANEOUS AS NEEDED
Status: DISCONTINUED | OUTPATIENT
Start: 2024-12-03 | End: 2024-12-03 | Stop reason: HOSPADM

## 2024-12-03 RX ADMIN — GADOBENATE DIMEGLUMINE 12 ML: 529 INJECTION, SOLUTION INTRAVENOUS at 16:48

## 2024-12-03 RX ADMIN — KETOROLAC TROMETHAMINE 15 MG: 15 INJECTION, SOLUTION INTRAMUSCULAR; INTRAVENOUS at 17:05

## 2024-12-03 RX ADMIN — SODIUM CHLORIDE 1000 ML: 9 INJECTION, SOLUTION INTRAVENOUS at 18:14

## 2024-12-03 RX ADMIN — DICYCLOMINE HYDROCHLORIDE 20 MG: 10 CAPSULE ORAL at 18:13

## 2024-12-03 RX ADMIN — MAGNESIUM SULFATE 1 G: 1 INJECTION INTRAVENOUS at 17:05

## 2024-12-03 RX ADMIN — BUTALBITAL, ACETAMINOPHEN, AND CAFFEINE 1 TABLET: 50; 325; 40 TABLET ORAL at 19:19

## 2024-12-03 RX ADMIN — METOCLOPRAMIDE 10 MG: 5 INJECTION, SOLUTION INTRAMUSCULAR; INTRAVENOUS at 17:05

## 2024-12-03 NOTE — ED PROVIDER NOTES
Subjective   History of Present Illness patient is a 49-year-old female with multiple complaints and endorses numerous comorbidities and previous evaluations for various conditions, who presents to the emergency department after evaluation at outside hospital for abdominal pain and diagnosed with colitis yesterday, has not yet initiated prescribed antibiotic therapy, reports her abdominal pain is worsened, with nausea, she reports generalized weakness, as well as dizziness and headache, is concerned over the potential for progression of MS related brain changes, requesting MRI.  In addition patient reports longstanding bladder discomfort with evaluation for interstitial cystitis.  She reports her bowel pattern includes liquid stool regularly, and denies vomiting.    Review of Systems   Respiratory: Negative.     Cardiovascular: Negative.    Gastrointestinal:  Positive for abdominal pain, diarrhea and nausea.   Genitourinary:  Positive for dysuria and frequency.   Musculoskeletal: Negative.    Skin: Negative.    Neurological:  Positive for dizziness, weakness and headaches.       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    History of transfusion     PER PT, RECEIVED SEVERAL UNITS AT [facility], NO REACTION; RECEIVED 2 UNITS AT  [facility]IC, NO REACTION    History of vertebral fracture (2/2016) 10/27/2016    Hypotension     IBS (irritable bowel syndrome)     Irregular heart rhythm     Migraine without aura and without status migrainosus, not intractable 10/28/2016    Numbness and tingling in both hands     Numbness and tingling of both feet 06/19/2018    Numbness and tingling of left leg     POTS (postural orthostatic  tachycardia syndrome)     PTSD (post-traumatic stress disorder)     Sjogren's disease     SOBOE (shortness of breath on exertion)     Spinal headache 2019    spinal patch needed    Tachycardia     Tinnitus     Wears glasses        Allergies   Allergen Reactions    Erenumab-Aooe Swelling     Aimovig    Influenza Virus Vaccine Other (See Comments)      Guillain-Barré    Wasp Venom Swelling       Past Surgical History:   Procedure Laterality Date    BREAST SURGERY Right     bx- marker in place still     SECTION      CHOLECYSTECTOMY N/A 2020    Procedure: CHOLECYSTECTOMY LAPAROSCOPIC;  Surgeon: Jonah Diop MD;  Location:  [facility];  Service: General;  Laterality: N/A;    COLONOSCOPY      D & C HYSTEROSCOPY ENDOMETRIAL ABLATION N/A 2023    Procedure: HYSTEROSCOPY WITH NOVASURE ENDOMETRIAL ABLATION;  Surgeon: Delmy Kelly MD;  Location:  [facility];  Service: Gynecology;  Laterality: N/A;    ENDOSCOPY      TEMPOROMANDIBULAR JOINT SURGERY      TOTAL LAPAROSCOPIC HYSTERECTOMY WITH TRANSVAGINAL TAPING N/A 2023    Procedure: TOTAL LAPAROSCOPIC HYSTERECTOMY BILATERAL SALPINGOOPHORECTOMY WITH DAVINCI ROBOT, CYSTOSCOPY, TENSION FREE VAGINAL TAPING SUBURETHRAL SLING;  Surgeon: Delmy Kelly MD;  Location:  [facility];  Service: Robotics - DaVinci;  Laterality: N/A;    TUBAL ABDOMINAL LIGATION         Family History   Problem Relation Age of Onset    Scleroderma Mother     Coronary artery disease Father     COPD Father        Social History     Socioeconomic History    Marital status:    Tobacco Use    Smoking status: Never    Smokeless tobacco: Never   Vaping Use    Vaping status: Never Used   Substance and Sexual Activity    Alcohol use: Yes     Comment: occasional glass of wine     Drug use: Never    Sexual activity: Defer           Objective   Physical Exam  Constitutional:       Appearance: She is well-developed. She is not toxic-appearing.   HENT:      Head:  Normocephalic and atraumatic.   Eyes:      Extraocular Movements: Extraocular movements intact.      Pupils: Pupils are equal, round, and reactive to light.   Cardiovascular:      Rate and Rhythm: Tachycardia present.   Pulmonary:      Effort: Pulmonary effort is normal.      Breath sounds: Normal breath sounds.   Abdominal:      General: Abdomen is flat. Bowel sounds are normal.      Palpations: Abdomen is soft.      Tenderness: There is generalized abdominal tenderness.   Skin:     General: Skin is warm and dry.      Capillary Refill: Capillary refill takes less than 2 seconds.   Neurological:      General: No focal deficit present.      Mental Status: She is alert and oriented to person, place, and time.   Psychiatric:         Mood and Affect: Mood is anxious.         Procedures           ED Course  ED Course as of 12/03/24 2028   Tue Dec 03, 2024   1409 Patient initially located in ED waiting room, accompanied by her father, and ambulated carefully to the pit area. [JH]   1438 Patient escorted to the ED registration area. [JH]   1505 Patient's been roomed to ED room 9. [JH]   1529 CBC without actionable abnormality. [JH]   1545 Lactate is negative. [JH]   1552 Lipase, lactate and Phos within normal limits. [JH]   1602 Mag level within normal limits. [JH]   1613 Urine micro resulted, with pyuria, as well as contamination, absence of bacteriuria, hematuria and is nitrite positive.  Serum chemistry resulted with notable transaminitis, ALT predominant. [JH]   1629 Took call from MRI, we will augment the imaging study to include with and without contrast for evaluation of concern of multiple sclerosis. [JH]   1718 MRI brain resulted without any acute abnormality or enhancement.  There is mild scattered white matter hyperintensities which are nonspecific and consistent with previous imaging studies, patient will be referred back to her neurologist of record.  Will reevaluate patient for response to medications  administered. []   1747 Reevaluated the patient, she reports her headache which is tension-like in the front and both sides has increased since she was in MRI, she endorses that the loud sounds may have worsened it, she rates a 6 out of 10 at this time.  We will proceed with oral medications for attempted resolution or improvement.  Also discussed her urine specimen result, she declines any antibiotic treatment as she follows with urology, with suspicion of interstitial cystitis and they have recommended no antibiotic treatment unless culture positive. []   1945 She reports her migraine headache is much improved, she would like to be discharged home.  She understand return precautions, as well as follow-up with her specialist. []      ED Course User Index  [] Greg Dickson, APRN                                                       Medical Decision Making  Given the patient's complaints, their chronicity, her history, and my exam, differential includes migraine syndrome, colitis, cannot exclude urinary tract infection, dehydration, electrolyte derangement, chronic intracranial changes, anxiety reaction.  Patient will have serum screening labs, urinalysis, MRI of the brain without contrast, IV antiemetic and analgesic administration, with reevaluation for improvement.  Will communicate workup results and plan patient's disposition, including referral to her gastroenterologist, neurology and primary care providers.  Patient is agreeable with this plan.    Amount and/or Complexity of Data Reviewed  Labs: ordered.  Radiology: ordered.    Risk  Prescription drug management.        Final diagnoses:   Chronic migraine without aura without status migrainosus, not intractable   Pyuria   Colitis   Transaminitis       ED Disposition  ED Disposition       ED Disposition   Discharge    Condition   Stable    Comment   --               Nevin Rosa, APRN  [address]  [facility]  [id number]  [phone]          Collett, Megan, APRN  [address]  [address]  [place] [id number]  [phone]          Justine Rosa MD  [address] [id number]  [phone]               Medication List      No changes were made to your prescriptions during this visit.            Greg Dickson, APRN  12/03/24 [id number]

## 2024-12-03 NOTE — DISCHARGE INSTRUCTIONS
Follow-up with your gastroenterologist Dr. Vazquez, your neurologist Dr. Collett, your primary care provider.

## 2024-12-05 LAB — BACTERIA SPEC AEROBE CULT: NO GROWTH

## 2025-05-23 NOTE — PLAN OF CARE
Goal Outcome Evaluation:  Plan of Care Reviewed With: patient        Discharge education complete: activity restriction, medication education, after care. No supplies needed for home        THE HOSPITAL CALLED TO SCHEDULE PATIENT [facility]S POST-OP - ATTEMPTED TO W/T PER OUR WORKFLOW, BUT THE OFFICE DID NOT ANSWER - PLEASE CALL THE PT TO SCHEDULE THE POST-OP ASAP. THANKS.   Yes - the patient is able to be screened

## (undated) DEVICE — ARM DRAPE

## (undated) DEVICE — SUT MNCRYL PLS ANTIB UD 4/0 PS2 18IN

## (undated) DEVICE — PROB ABL ENDOMTRL NOVASURE/G4 IMPEDENCE 1P/U

## (undated) DEVICE — APPL CHLORAPREP TINTED 26ML TEAL

## (undated) DEVICE — COLUMN DRAPE

## (undated) DEVICE — NDL HYPO ECLPS SFTY 22G 1 1/2IN

## (undated) DEVICE — CYSTO/BLADDER IRRIGATION SET, REGULATING CLAMP

## (undated) DEVICE — VISUALIZATION SYSTEM: Brand: CLEARIFY

## (undated) DEVICE — SNAP KOVER: Brand: UNBRANDED

## (undated) DEVICE — ENDOPATH XCEL BLADELESS TROCARS WITH STABILITY SLEEVES: Brand: ENDOPATH XCEL

## (undated) DEVICE — INTENDED FOR TISSUE SEPARATION, AND OTHER PROCEDURES THAT REQUIRE A SHARP SURGICAL BLADE TO PUNCTURE OR CUT.: Brand: BARD-PARKER ® STAINLESS STEEL BLADES

## (undated) DEVICE — BLADELESS OBTURATOR: Brand: WECK VISTA

## (undated) DEVICE — SUT VIC 3/0 SH 27IN J416H

## (undated) DEVICE — LAPAROVUE VISIBILITY SYSTEM LAPAROSCOPIC SOLUTIONS: Brand: LAPAROVUE

## (undated) DEVICE — ANTIBACTERIAL UNDYED BRAIDED (POLYGLACTIN 910), SYNTHETIC ABSORBABLE SUTURE: Brand: COATED VICRYL

## (undated) DEVICE — SUT VIC 0 UR6 27IN VCP603H

## (undated) DEVICE — GLV SURG SENSICARE PI MIC PF SZ6.5 LF STRL

## (undated) DEVICE — DRAPE,TOP,102X53,STERILE: Brand: MEDLINE

## (undated) DEVICE — CVR HNDL LIGHT RIGID

## (undated) DEVICE — PK MAJ GYN DAVINCI 10

## (undated) DEVICE — PK LAP LASR CHOLE 10

## (undated) DEVICE — GLV SURG SENSICARE PI MIC PF SZ8.5 LF STRL

## (undated) DEVICE — ENDOPATH PNEUMONEEDLE INSUFFLATION NEEDLES WITH LUER LOCK CONNECTORS 120MM: Brand: ENDOPATH

## (undated) DEVICE — SYR LL TP 10ML STRL

## (undated) DEVICE — CANNULA SEAL

## (undated) DEVICE — SUT VIC 0 TIES 18IN J912G

## (undated) DEVICE — LEX D AND C: Brand: MEDLINE INDUSTRIES, INC.

## (undated) DEVICE — LAP PORT CLOSURE GUIDES 5MM AND 10/12MM: Brand: LAP PORT CLOSURE GUIDES 5MM AND 10/12MM

## (undated) DEVICE — GLV SURG SENSICARE PI MIC PF SZ8 LF STRL

## (undated) DEVICE — GLV SURG PREMIERPRO MIC LTX PF SZ6 BRN

## (undated) DEVICE — MANIP UTER RUMI TP 6.7MMX8CM BLU

## (undated) DEVICE — SEAL HYSTERSCOPE/OUTFLOW CHANNEL MYOSURE

## (undated) DEVICE — SCRB SURG BACTOSHIELD CHG 4PCT 4OZ

## (undated) DEVICE — IRRIGATOR BULB ASEPTO 60CC STRL

## (undated) DEVICE — SUT SILK 2/0 TIES 18IN A185H

## (undated) DEVICE — CATH FOL CONT IRR 3WY 16F 5CC

## (undated) DEVICE — MANIP UTER RUMI 2 KOH EFFICIENT 3CM BL

## (undated) DEVICE — ENDOPOUCH RETRIEVER SPECIMEN RETRIEVAL BAGS: Brand: ENDOPOUCH RETRIEVER

## (undated) DEVICE — PENCL ROCKRSWCH MEGADYNE W/HOLSTR 10FT SS

## (undated) DEVICE — ENDOCUT SCISSOR TIP, DISPOSABLE: Brand: RENEW

## (undated) DEVICE — BLANKT WARM UPPR/BDY ARM/OUT 57X196CM

## (undated) DEVICE — PATIENT RETURN ELECTRODE, SINGLE-USE, CONTACT QUALITY MONITORING, ADULT, WITH 9FT CORD, FOR PATIENTS WEIGING OVER 33LBS. (15KG): Brand: MEGADYNE

## (undated) DEVICE — Device

## (undated) DEVICE — [HIGH FLOW INSUFFLATOR,  DO NOT USE IF PACKAGE IS DAMAGED,  KEEP DRY,  KEEP AWAY FROM SUNLIGHT,  PROTECT FROM HEAT AND RADIOACTIVE SOURCES.]: Brand: PNEUMOSURE

## (undated) DEVICE — GOWN,PREVENTION PLUS,XXLARGE,STERILE: Brand: MEDLINE

## (undated) DEVICE — ENDOPATH XCEL UNIVERSAL TROCAR STABLILITY SLEEVES: Brand: ENDOPATH XCEL

## (undated) DEVICE — TUBING, SUCTION, 1/4" X 10', STRAIGHT: Brand: MEDLINE

## (undated) DEVICE — ST TBG CONN PNEUMOCLEAR EVAC SMOKE HEAT/HUMID

## (undated) DEVICE — GOWN,NON-REINFORCED,SIRUS,SET IN SLV,XL: Brand: MEDLINE

## (undated) DEVICE — GLV SURG SIGNATURE TOUCH PF LTX 7 STRL

## (undated) DEVICE — TIP COVER ACCESSORY

## (undated) DEVICE — ADHS SKIN PREMIERPRO EXOFIN TOPICAL HI/VISC .5ML